# Patient Record
Sex: MALE | Race: BLACK OR AFRICAN AMERICAN | NOT HISPANIC OR LATINO | Employment: OTHER | URBAN - METROPOLITAN AREA
[De-identification: names, ages, dates, MRNs, and addresses within clinical notes are randomized per-mention and may not be internally consistent; named-entity substitution may affect disease eponyms.]

---

## 2020-12-03 ENCOUNTER — HOSPITAL ENCOUNTER (OUTPATIENT)
Dept: CARDIOLOGY | Facility: HOSPITAL | Age: 80
Discharge: HOME | End: 2020-12-03

## 2020-12-23 ENCOUNTER — CONVERSION ENCOUNTER (OUTPATIENT)
Dept: FAMILY MEDICINE CLINIC | Facility: CLINIC | Age: 80
End: 2020-12-23

## 2020-12-23 ENCOUNTER — HOSPITAL ENCOUNTER (OUTPATIENT)
Dept: FAMILY MEDICINE CLINIC | Facility: CLINIC | Age: 80
Discharge: HOME | End: 2020-12-23
Attending: NURSE PRACTITIONER

## 2020-12-23 ENCOUNTER — OFFICE VISIT CONVERTED (OUTPATIENT)
Dept: FAMILY MEDICINE CLINIC | Facility: CLINIC | Age: 80
End: 2020-12-23
Attending: NURSE PRACTITIONER

## 2020-12-23 LAB
ALBUMIN SERPL-MCNC: 4 G/DL (ref 3.5–5)
ALBUMIN/GLOB SERPL: 1.1 {RATIO} (ref 1.4–2.6)
ALP SERPL-CCNC: 87 U/L (ref 56–155)
ALT SERPL-CCNC: 12 U/L (ref 10–40)
ANION GAP SERPL CALC-SCNC: 16 MMOL/L (ref 8–19)
AST SERPL-CCNC: 15 U/L (ref 15–50)
BASOPHILS # BLD AUTO: 0.08 10*3/UL (ref 0–0.2)
BASOPHILS NFR BLD AUTO: 1.1 % (ref 0–3)
BILIRUB SERPL-MCNC: 0.29 MG/DL (ref 0.2–1.3)
BUN SERPL-MCNC: 18 MG/DL (ref 5–25)
BUN/CREAT SERPL: 13 {RATIO} (ref 6–20)
CALCIUM SERPL-MCNC: 9.2 MG/DL (ref 8.7–10.4)
CHLORIDE SERPL-SCNC: 107 MMOL/L (ref 99–111)
CK SERPL-CCNC: 143 U/L (ref 57–374)
CONV ABS IMM GRAN: 0.03 10*3/UL (ref 0–0.2)
CONV CO2: 21 MMOL/L (ref 22–32)
CONV IMMATURE GRAN: 0.4 % (ref 0–1.8)
CONV TOTAL PROTEIN: 7.5 G/DL (ref 6.3–8.2)
CREAT UR-MCNC: 1.36 MG/DL (ref 0.7–1.2)
DEPRECATED RDW RBC AUTO: 46.5 FL (ref 35.1–43.9)
EOSINOPHIL # BLD AUTO: 0.11 10*3/UL (ref 0–0.7)
EOSINOPHIL # BLD AUTO: 1.6 % (ref 0–7)
ERYTHROCYTE [DISTWIDTH] IN BLOOD BY AUTOMATED COUNT: 14.5 % (ref 11.6–14.4)
EST. AVERAGE GLUCOSE BLD GHB EST-MCNC: 143 MG/DL
FOLATE SERPL-MCNC: 4.3 NG/ML (ref 4.8–20)
GFR SERPLBLD BASED ON 1.73 SQ M-ARVRAT: 56 ML/MIN/{1.73_M2}
GLOBULIN UR ELPH-MCNC: 3.5 G/DL (ref 2–3.5)
GLUCOSE SERPL-MCNC: 99 MG/DL (ref 70–99)
HBA1C MFR BLD: 6.6 % (ref 3.5–5.7)
HCT VFR BLD AUTO: 35.6 % (ref 42–52)
HGB BLD-MCNC: 11.1 G/DL (ref 14–18)
LYMPHOCYTES # BLD AUTO: 2.36 10*3/UL (ref 1–5)
LYMPHOCYTES NFR BLD AUTO: 33.7 % (ref 20–45)
MAGNESIUM SERPL-MCNC: 1.67 MG/DL (ref 1.6–2.3)
MCH RBC QN AUTO: 27.1 PG (ref 27–31)
MCHC RBC AUTO-ENTMCNC: 31.2 G/DL (ref 33–37)
MCV RBC AUTO: 87 FL (ref 80–96)
MONOCYTES # BLD AUTO: 0.58 10*3/UL (ref 0.2–1.2)
MONOCYTES NFR BLD AUTO: 8.3 % (ref 3–10)
NEUTROPHILS # BLD AUTO: 3.85 10*3/UL (ref 2–8)
NEUTROPHILS NFR BLD AUTO: 54.9 % (ref 30–85)
NRBC CBCN: 0 % (ref 0–0.7)
OSMOLALITY SERPL CALC.SUM OF ELEC: 294 MOSM/KG (ref 273–304)
PLATELET # BLD AUTO: 239 10*3/UL (ref 130–400)
PMV BLD AUTO: 11.9 FL (ref 9.4–12.4)
POTASSIUM SERPL-SCNC: 3.4 MMOL/L (ref 3.5–5.3)
RBC # BLD AUTO: 4.09 10*6/UL (ref 4.7–6.1)
SODIUM SERPL-SCNC: 141 MMOL/L (ref 135–147)
T4 FREE SERPL-MCNC: 1.4 NG/DL (ref 0.9–1.8)
TSH SERPL-ACNC: 4.88 M[IU]/L (ref 0.27–4.2)
VIT B12 SERPL-MCNC: 411 PG/ML (ref 211–911)
WBC # BLD AUTO: 7.01 10*3/UL (ref 4.8–10.8)

## 2020-12-24 LAB
IRON SATN MFR SERPL: 20 % (ref 20–55)
IRON SERPL-MCNC: 54 UG/DL (ref 70–180)
TIBC SERPL-MCNC: 273 UG/DL (ref 245–450)
TRANSFERRIN SERPL-MCNC: 191 MG/DL (ref 215–365)

## 2020-12-29 ENCOUNTER — HOSPITAL ENCOUNTER (OUTPATIENT)
Dept: CT IMAGING | Facility: HOSPITAL | Age: 80
Discharge: HOME | End: 2020-12-29
Attending: NURSE PRACTITIONER

## 2020-12-30 ENCOUNTER — HOSPITAL ENCOUNTER (OUTPATIENT)
Dept: FAMILY MEDICINE CLINIC | Facility: CLINIC | Age: 80
Discharge: HOME | End: 2020-12-30
Attending: NURSE PRACTITIONER

## 2021-01-01 LAB — SARS-COV-2 RNA SPEC QL NAA+PROBE: NOT DETECTED

## 2021-01-05 ENCOUNTER — HOSPITAL ENCOUNTER (OUTPATIENT)
Dept: CT IMAGING | Facility: HOSPITAL | Age: 81
Discharge: HOME | End: 2021-01-05
Attending: NURSE PRACTITIONER

## 2021-01-13 ENCOUNTER — OFFICE VISIT CONVERTED (OUTPATIENT)
Dept: GASTROENTEROLOGY | Facility: CLINIC | Age: 81
End: 2021-01-13
Attending: NURSE PRACTITIONER

## 2021-01-13 ENCOUNTER — HOSPITAL ENCOUNTER (OUTPATIENT)
Dept: LAB | Facility: HOSPITAL | Age: 81
Discharge: HOME | End: 2021-01-13

## 2021-01-13 LAB
CRP SERPL-MCNC: 25.7 MG/L (ref 0–5)
ERYTHROCYTE [SEDIMENTATION RATE] IN BLOOD: 31 MM/H (ref 0–20)

## 2021-01-18 ENCOUNTER — TELEPHONE CONVERTED (OUTPATIENT)
Dept: FAMILY MEDICINE CLINIC | Facility: CLINIC | Age: 81
End: 2021-01-18
Attending: NURSE PRACTITIONER

## 2021-01-20 ENCOUNTER — HOSPITAL ENCOUNTER (OUTPATIENT)
Dept: LAB | Facility: HOSPITAL | Age: 81
Discharge: HOME | End: 2021-01-20
Attending: NURSE PRACTITIONER

## 2021-01-20 ENCOUNTER — OFFICE VISIT CONVERTED (OUTPATIENT)
Dept: NEUROLOGY | Facility: CLINIC | Age: 81
End: 2021-01-20
Attending: NURSE PRACTITIONER

## 2021-01-20 LAB
ALBUMIN SERPL-MCNC: 4.1 G/DL (ref 3.5–5)
ALBUMIN/GLOB SERPL: 1.2 {RATIO} (ref 1.4–2.6)
ALP SERPL-CCNC: 84 U/L (ref 56–155)
ALT SERPL-CCNC: 12 U/L (ref 10–40)
ANION GAP SERPL CALC-SCNC: 16 MMOL/L (ref 8–19)
AST SERPL-CCNC: 12 U/L (ref 15–50)
BILIRUB SERPL-MCNC: 0.21 MG/DL (ref 0.2–1.3)
BUN SERPL-MCNC: 19 MG/DL (ref 5–25)
BUN/CREAT SERPL: 12 {RATIO} (ref 6–20)
CALCIUM SERPL-MCNC: 9.1 MG/DL (ref 8.7–10.4)
CHLORIDE SERPL-SCNC: 108 MMOL/L (ref 99–111)
CONV CO2: 22 MMOL/L (ref 22–32)
CONV TOTAL PROTEIN: 7.6 G/DL (ref 6.3–8.2)
CREAT UR-MCNC: 1.63 MG/DL (ref 0.7–1.2)
GFR SERPLBLD BASED ON 1.73 SQ M-ARVRAT: 45 ML/MIN/{1.73_M2}
GLOBULIN UR ELPH-MCNC: 3.5 G/DL (ref 2–3.5)
GLUCOSE SERPL-MCNC: 97 MG/DL (ref 70–99)
OSMOLALITY SERPL CALC.SUM OF ELEC: 296 MOSM/KG (ref 273–304)
POTASSIUM SERPL-SCNC: 3.9 MMOL/L (ref 3.5–5.3)
SODIUM SERPL-SCNC: 142 MMOL/L (ref 135–147)
T4 FREE SERPL-MCNC: 1.2 NG/DL (ref 0.9–1.8)
TSH SERPL-ACNC: 5.69 M[IU]/L (ref 0.27–4.2)

## 2021-01-27 ENCOUNTER — OFFICE VISIT CONVERTED (OUTPATIENT)
Dept: NEUROLOGY | Facility: CLINIC | Age: 81
End: 2021-01-27
Attending: NURSE PRACTITIONER

## 2021-01-27 ENCOUNTER — HOSPITAL ENCOUNTER (OUTPATIENT)
Dept: LAB | Facility: HOSPITAL | Age: 81
Discharge: HOME | End: 2021-01-27
Attending: NURSE PRACTITIONER

## 2021-01-27 LAB
CRP SERPL-MCNC: <3 MG/L (ref 0–5)
ERYTHROCYTE [SEDIMENTATION RATE] IN BLOOD: 22 MM/H (ref 0–20)

## 2021-01-28 ENCOUNTER — HOSPITAL ENCOUNTER (OUTPATIENT)
Dept: PREADMISSION TESTING | Facility: HOSPITAL | Age: 81
Discharge: HOME | End: 2021-01-28
Attending: INTERNAL MEDICINE

## 2021-01-29 ENCOUNTER — HOSPITAL ENCOUNTER (OUTPATIENT)
Dept: CT IMAGING | Facility: HOSPITAL | Age: 81
Discharge: HOME | End: 2021-01-29
Attending: NURSE PRACTITIONER

## 2021-01-29 LAB — SARS-COV-2 RNA SPEC QL NAA+PROBE: NOT DETECTED

## 2021-02-02 ENCOUNTER — HOSPITAL ENCOUNTER (OUTPATIENT)
Dept: GASTROENTEROLOGY | Facility: HOSPITAL | Age: 81
Setting detail: HOSPITAL OUTPATIENT SURGERY
Discharge: HOME | End: 2021-02-02
Attending: INTERNAL MEDICINE

## 2021-02-02 LAB — GLUCOSE BLD-MCNC: 176 MG/DL (ref 70–99)

## 2021-02-05 ENCOUNTER — CONVERSION ENCOUNTER (OUTPATIENT)
Dept: OTOLARYNGOLOGY | Facility: CLINIC | Age: 81
End: 2021-02-05

## 2021-02-05 ENCOUNTER — OFFICE VISIT CONVERTED (OUTPATIENT)
Dept: OTOLARYNGOLOGY | Facility: CLINIC | Age: 81
End: 2021-02-05
Attending: OTOLARYNGOLOGY

## 2021-02-10 ENCOUNTER — OFFICE VISIT CONVERTED (OUTPATIENT)
Dept: NEUROLOGY | Facility: CLINIC | Age: 81
End: 2021-02-10
Attending: NURSE PRACTITIONER

## 2021-03-10 ENCOUNTER — HOSPITAL ENCOUNTER (OUTPATIENT)
Dept: LAB | Facility: HOSPITAL | Age: 81
Discharge: HOME | End: 2021-03-10
Attending: NURSE PRACTITIONER

## 2021-03-11 LAB
T4 FREE SERPL-MCNC: 1.3 NG/DL (ref 0.9–1.8)
TSH SERPL-ACNC: 2.09 M[IU]/L (ref 0.27–4.2)

## 2021-03-17 ENCOUNTER — OFFICE VISIT CONVERTED (OUTPATIENT)
Dept: FAMILY MEDICINE CLINIC | Facility: CLINIC | Age: 81
End: 2021-03-17
Attending: NURSE PRACTITIONER

## 2021-03-17 ENCOUNTER — HOSPITAL ENCOUNTER (OUTPATIENT)
Dept: FAMILY MEDICINE CLINIC | Facility: CLINIC | Age: 81
Discharge: HOME | End: 2021-03-17
Attending: NURSE PRACTITIONER

## 2021-03-17 LAB
ALBUMIN SERPL-MCNC: 3.9 G/DL (ref 3.5–5)
ALBUMIN/GLOB SERPL: 1.2 {RATIO} (ref 1.4–2.6)
ALP SERPL-CCNC: 86 U/L (ref 56–155)
ALT SERPL-CCNC: 9 U/L (ref 10–40)
ANION GAP SERPL CALC-SCNC: 18 MMOL/L (ref 8–19)
AST SERPL-CCNC: 11 U/L (ref 15–50)
BASOPHILS # BLD AUTO: 0.04 10*3/UL (ref 0–0.2)
BASOPHILS NFR BLD AUTO: 0.5 % (ref 0–3)
BILIRUB SERPL-MCNC: 0.15 MG/DL (ref 0.2–1.3)
BUN SERPL-MCNC: 17 MG/DL (ref 5–25)
BUN/CREAT SERPL: 11 {RATIO} (ref 6–20)
CALCIUM SERPL-MCNC: 9.1 MG/DL (ref 8.7–10.4)
CHLORIDE SERPL-SCNC: 110 MMOL/L (ref 99–111)
CHOLEST SERPL-MCNC: 134 MG/DL (ref 107–200)
CHOLEST/HDLC SERPL: 2.9 {RATIO} (ref 3–6)
CONV ABS IMM GRAN: 0.03 10*3/UL (ref 0–0.2)
CONV CO2: 22 MMOL/L (ref 22–32)
CONV CREATININE URINE, RANDOM: 204.9 MG/DL (ref 10–300)
CONV IMMATURE GRAN: 0.4 % (ref 0–1.8)
CONV MICROALBUM.,U,RANDOM: 24.2 MG/L (ref 0–20)
CONV TOTAL PROTEIN: 7.2 G/DL (ref 6.3–8.2)
CREAT UR-MCNC: 1.59 MG/DL (ref 0.7–1.2)
DEPRECATED RDW RBC AUTO: 44.1 FL (ref 35.1–43.9)
EOSINOPHIL # BLD AUTO: 0.12 10*3/UL (ref 0–0.7)
EOSINOPHIL # BLD AUTO: 1.5 % (ref 0–7)
ERYTHROCYTE [DISTWIDTH] IN BLOOD BY AUTOMATED COUNT: 14 % (ref 11.6–14.4)
EST. AVERAGE GLUCOSE BLD GHB EST-MCNC: 148 MG/DL
FOLATE SERPL-MCNC: 17.5 NG/ML (ref 4.8–20)
GFR SERPLBLD BASED ON 1.73 SQ M-ARVRAT: 46 ML/MIN/{1.73_M2}
GLOBULIN UR ELPH-MCNC: 3.3 G/DL (ref 2–3.5)
GLUCOSE SERPL-MCNC: 99 MG/DL (ref 70–99)
HBA1C MFR BLD: 6.8 % (ref 3.5–5.7)
HCT VFR BLD AUTO: 36.6 % (ref 42–52)
HDLC SERPL-MCNC: 47 MG/DL (ref 40–60)
HGB BLD-MCNC: 11.5 G/DL (ref 14–18)
LDLC SERPL CALC-MCNC: 64 MG/DL (ref 70–100)
LYMPHOCYTES # BLD AUTO: 2.75 10*3/UL (ref 1–5)
LYMPHOCYTES NFR BLD AUTO: 34.8 % (ref 20–45)
MCH RBC QN AUTO: 27.3 PG (ref 27–31)
MCHC RBC AUTO-ENTMCNC: 31.4 G/DL (ref 33–37)
MCV RBC AUTO: 86.9 FL (ref 80–96)
MICROALBUMIN/CREAT UR: 11.8 MG/G{CRE} (ref 0–25)
MONOCYTES # BLD AUTO: 0.65 10*3/UL (ref 0.2–1.2)
MONOCYTES NFR BLD AUTO: 8.2 % (ref 3–10)
NEUTROPHILS # BLD AUTO: 4.32 10*3/UL (ref 2–8)
NEUTROPHILS NFR BLD AUTO: 54.6 % (ref 30–85)
NRBC CBCN: 0 % (ref 0–0.7)
OSMOLALITY SERPL CALC.SUM OF ELEC: 304 MOSM/KG (ref 273–304)
PLATELET # BLD AUTO: 225 10*3/UL (ref 130–400)
PMV BLD AUTO: 11.7 FL (ref 9.4–12.4)
POTASSIUM SERPL-SCNC: 4.2 MMOL/L (ref 3.5–5.3)
RBC # BLD AUTO: 4.21 10*6/UL (ref 4.7–6.1)
SODIUM SERPL-SCNC: 146 MMOL/L (ref 135–147)
T4 FREE SERPL-MCNC: 1.3 NG/DL (ref 0.9–1.8)
TRIGL SERPL-MCNC: 117 MG/DL (ref 40–150)
TSH SERPL-ACNC: 2.8 M[IU]/L (ref 0.27–4.2)
VIT B12 SERPL-MCNC: 401 PG/ML (ref 211–911)
VLDLC SERPL-MCNC: 23 MG/DL (ref 5–37)
WBC # BLD AUTO: 7.91 10*3/UL (ref 4.8–10.8)

## 2021-03-18 LAB
IRON SATN MFR SERPL: 19 % (ref 20–55)
IRON SERPL-MCNC: 52 UG/DL (ref 70–180)
TIBC SERPL-MCNC: 267 UG/DL (ref 245–450)
TRANSFERRIN SERPL-MCNC: 187 MG/DL (ref 215–365)

## 2021-03-24 ENCOUNTER — HOSPITAL ENCOUNTER (OUTPATIENT)
Dept: LAB | Facility: HOSPITAL | Age: 81
Discharge: HOME | End: 2021-03-24
Attending: INTERNAL MEDICINE

## 2021-03-24 LAB
APPEARANCE UR: CLEAR
BILIRUB UR QL: NEGATIVE
COLOR UR: YELLOW
CONV COLLECTION SOURCE (UA): NORMAL
CONV UROBILINOGEN IN URINE BY AUTOMATED TEST STRIP: 0.2 {EHRLICHU}/DL (ref 0.1–1)
GLUCOSE UR QL: NEGATIVE MG/DL
HGB UR QL STRIP: NEGATIVE
KETONES UR QL STRIP: NEGATIVE MG/DL
LEUKOCYTE ESTERASE UR QL STRIP: NEGATIVE
NITRITE UR QL STRIP: NEGATIVE
PH UR STRIP.AUTO: 5.5 [PH] (ref 5–8)
PROT UR QL: NORMAL MG/DL
SP GR UR: 1.02 (ref 1–1.03)

## 2021-03-25 LAB
CONV CREATININE URINE, RANDOM: 213.8 MG/DL (ref 10–300)
PROT UR-MCNC: 37 MG/DL

## 2021-03-31 ENCOUNTER — HOSPITAL ENCOUNTER (OUTPATIENT)
Dept: ULTRASOUND IMAGING | Facility: HOSPITAL | Age: 81
Discharge: HOME | End: 2021-03-31
Attending: INTERNAL MEDICINE

## 2021-05-10 NOTE — H&P
History and Physical      Patient Name: Too Velasquez   Patient ID: [id number]   Sex: Male   YOB: 1940    Primary Care Provider: Lanie TORRES   Referring Provider: Lanie TORRES    Visit Date: January 13, 2021    Provider: BRIAN Dunn   Location: [facility]   Location Address: [address] [place] [address], [address]  [place], [place]  [id number]   Location Phone: [phone]          Chief Complaint  · Anemia      History Of Present Illness  The patient is a 80 year old male who presents on referral from Lanie TORRES for a gastroenterology evaluation.      He presents for anorexia and anemia.    12/23/2020 CBC: Hemoglobin 11.1, hematocrit 35.6, platelets 239.  CMP: Creatinine 1.36, alk phos 87, ALT 12, AST 15, total bilirubin 0.29.  Iron profile: Iron saturation 20%, total iron 54.  Transferrin 191.  Vitamin B12- 411, folic acid-4.3    12/29/2020 CT abdomen pelvis with contrast-liver and spleen are normal in size and contour.  Cholelithiasis.  No pericholecystic inflammation.  Common bile duct normal.  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  Normal pancreas.  Bilateral renal cysts.  Mild groundglass opacities in the right middle lobe.    He reports that his appetite is good. Denies weight loss.  States that he has not been feeling well since early November.  Tested negative for COVID, but was diagnosed with pneumonia and CVA.      He reports that he continues to have trouble breathing.  Rx'd nebs TID, but not using them TID.   Having some issues with eyes.      Reports a bowel movement daily.  Denies rectal bleeding.  Admits dark stool since beginning oral iron.      Denies heartburn, nausea .  Admits dysphagia with some secretions.      Denies previous colonoscopy.      Weight  stable.    Denies family history of colon cancer.             Past Medical History  Diabetes mellitus; Glaucoma; High cholesterol; Hypertension; Irregular heart rate; Shortness of Breath; Sinusitis         Past Surgical History  Cataract surgery; Tumor removal         Medication List  albuterol sulfate 2.5 mg /3 mL (0.083 %) inhalation solution for nebulization; amlodipine 5 mg oral tablet; diltiazem HCl 240 mg oral capsule,extended release 24 hr; doxycycline hyclate 100 mg oral capsule; Eliquis 2.5 mg oral tablet; finasteride 5 mg oral tablet; folic acid 400 mcg oral tablet; Iron (ferrous sulfate) 325 mg (65 mg iron) oral tablet; Lantus U-100 Insulin 100 unit/mL subcutaneous solution; lisinopril 20 mg oral tablet; metformin 500 mg oral tablet; pravastatin 80 mg oral tablet; prednisone 20 mg oral tablet; tamsulosin 0.4 mg oral capsule         Allergy List  PENICILLINS       Allergies Reconciled  Family Medical History  Heart Disease; Diabetes Mellitus, Type II         Social History  Alcohol (Never); ; Retired [employer]; Tobacco (Never)         Immunizations  Name Date Admin   Influenza 10/01/2020   Prevnar 13 10/20/2018         Review of Systems  · Constitutional  o Denies  o : chills, fever  · Eyes  o Denies  o : blurred vision, changes in vision  · Cardiovascular  o Denies  o : chest pain, irregular heart beats  · Respiratory  o Denies  o : shortness of breath, cough  · Gastrointestinal  o Admits  o : See HPI  · Genitourinary  o Denies  o : dysuria, blood in urine  · Integument  o Denies  o : rash, new skin lesions  · Neurologic  o Denies  o : tingling or numbness, seizures  · Musculoskeletal  o Denies  o : joint pain, joint swelling  · Endocrine  o Denies  o : weight gain, weight loss  · Psychiatric  o Denies  o : anxiety, depression      Vitals  Date Time BP Position Site L\R Cuff Size HR RR TEMP (F) WT  HT  BMI kg/m2 BSA m2 O2 Sat FR L/min FiO2 HC       01/13/2021 10:35 /67 Sitting    82 - R  97.5  "193lbs 6oz 5'  10\" 27.75 2.08             Physical Examination  · Constitutional  o Appearance  o : well developed, well-nourished, in no acute distress  · Eyes  o Vision  o :   § Visual Fields  § : eyes move symmetrical in all directions  o Sclerae  o : anicteric  o Pupils and Irises  o : pupils equal and symmetrical  · Neck  o Inspection/Palpation  o : supple  · Respiratory  o Respiratory Effort  o : breathing unlabored  o Inspection of Chest  o : normal appearance, no retractions  o Auscultation of Lungs  o : clear to auscultation bilaterally  · Cardiovascular  o Heart  o :   § Auscultation of Heart  § : no murmurs, gallops or rubs  · Gastrointestinal  o Abdominal Examination  o : soft, nontender to palpation, with normal active bowel sounds, no appreciable hepatosplenomegaly  o Digital Rectal Exam  o : deferred  · Lymphatic  o Neck  o : no palpable lymphadenopathy  · Skin and Subcutaneous Tissue  o General Inspection  o : without focal lesions; turgor is normal  · Psychiatric  o General  o : Alert and oriented x3  o Mood and Affect  o : Mood and affect are appropriate to circumstances  · Extremities  o Extremities  o : No edema, no cyanosis          Assessment  · Pre-op testing     V72.84/Z01.818  · Other iron deficiency anemia     280.8/D50.8      Plan  · Orders  o [facility] Pre-Op Covid-19 Screening (17157) - - 01/13/2021  o Consent for Colonoscopy with Possible Biopsy - Possible risks/complications, benefits, and alternatives to surgical or invasive procedure have been explained to patient and/or legal guardian. -Patient has been evaluated and can tolerate anesthesia and/or sedation. Risks, benefits, and alternatives to anesthesia and sedation have been explained to patient and/or legal guardian. (25038) - - 01/13/2021  o Consent for Esophagogastroduodenoscopy (EGD) - Possible risks/complications, benefits, and alternatives to surgical or invasive procedure have been explained to patient and/or legal guardian. - " Patient has been evaluated and can tolerate anesthesia and/or sedation. Risks, benefits, and alternatives to anesthesia and sedation have been explained to patient and/or legal guardian. (37720) - - 01/13/2021  · Medications  o Medications have been Reconciled  · Instructions  o Handouts provided: Pre-procedure instructions including date and time and location of procedure.  o PLAN: Proceed with procedure. Patient understands risks and benefits and is willing to proceed. Understands the risks include, but are not limited to, bleeding and/or perforation.  o Information given on current diagnoses.  o Electronically Identified Patient Education Materials Provided Electronically  · Disposition  o Follow Up after procedure            Electronically Signed by: BRIAN Dunn -Author on January 13, 2021 12:22:51 PM

## 2021-05-10 NOTE — H&P
History and Physical      Patient Name: Too Velasquez   Patient ID: [id number]   Sex: Male   YOB: 1940    Primary Care Provider: Lanie TORRES   Referring Provider: Lanie TORRES    Visit Date: January 20, 2021    Provider: BIRAN Jiménez   Location: [facility]   Location Address: [address] [place] [address]  [id number]   Location Phone: [phone]          Chief Complaint     Headache.       History Of Present Illness  Too Velasquez is a 80 year old male who presents today to [facility] today referred from Lanie TORRES. Had eye surgery in November on right eye, and then 6 weeks ago had laser surgery on left eye. Began having decreased vision in left eye. Ophthalmology diagnosed with ocular ischemic syndrome. CRP and Sed rate slightly elevated. Has been having blurred vision in right eye as well. Pressure sensation to frontal head, worse on the right. Has been taking Tylenol for the head pain which helps some. Had MRI brain that was normal. Ophthalmology recommended he see neurology and cardiology. Denies jaw claudication. States blurred vision is intermittent. Denies hip pain. Denies rash. No other new symptoms reported.   Independently Reviewed: MRI brain       Past Medical History  Diabetes mellitus; Glaucoma; Headache; High cholesterol; Hypertension; Insomnia; Irregular heart rate; Shortness of Breath; Sinusitis         Past Surgical History  Cataract surgery; Tumor removal         Medication List  albuterol sulfate 2.5 mg /3 mL (0.083 %) inhalation solution for nebulization; amlodipine 5 mg oral tablet; diltiazem HCl 240 mg oral capsule,extended release 24 hr; Eliquis 2.5 mg oral tablet; finasteride 5 mg oral tablet; folic acid 400 mcg oral tablet; Iron (ferrous sulfate) 325 mg (65 mg iron) oral tablet; Lantus U-100 Insulin 100 unit/mL subcutaneous solution; lisinopril 20 mg oral tablet; metformin 500  "mg oral tablet; Plenvu 140-9-5.2 gram oral powder in packet, sequential; pravastatin 80 mg oral tablet; tamsulosin 0.4 mg oral capsule         Allergy List  PENICILLINS       Allergies Reconciled  Family Medical History  Heart Disease; Diabetes Mellitus, Type II         Social History  Alcohol (Never); ; Retired [employer]; Tobacco (Never)         Immunizations  Name Date Admin   Influenza 10/01/2020   Prevnar 13 10/20/2018         Review of Systems  · Constitutional  o Denies  o : chills, excessive sweating, fatigue, fever, sycope/passing out, weight gain, weight loss  · Eyes  o Denies  o : changes in vision, blurry vision, double vision  · HENT  o Denies  o : loss of hearing, ringing in the ears, ear aches, sore throat, nasal congestion, sinus pain, nose bleeds, seasonal allergies  · Cardiovascular  o Denies  o : blood clots, swollen legs, anemia, easy burising or bleeding, transfusions  · Respiratory  o Denies  o : shortness of breath, dry cough, productive cough, pneumonia, COPD  · Gastrointestinal  o Denies  o : difficulty swallowing, reflux  · Genitourinary  o Denies  o : incontinence  · Neurologic  o Admits  o : headache  o Denies  o : seizure, stroke, tremor, loss of balance, falls, dizziness/vertigo, difficulty with sleep, numbness/tingling/paresthesia , difficulty with coordination, difficulty with dexterity, weakness  · Musculoskeletal  o Denies  o : neck stiffness/pain, swollen lymph nodes, muscle aches, joint pain, weakness, spasms, sciatica, pain radiating in arm, pain radiating in leg, low back pain  · Endocrine  o Denies  o : diabetes, thyroid disorder  · Psychiatric  o Denies  o : anxiety, depression      Vitals  Date Time BP Position Site L\R Cuff Size HR RR TEMP (F) WT  HT  BMI kg/m2 BSA m2 O2 Sat FR L/min FiO2 HC       01/20/2021 07:58 /83 Sitting    78 - R  97.9 191lbs 0oz 5'  10\" 27.41 2.07             Physical Examination  · Constitutional  o Appearance  o : well-nourished, well " groomed, in no apparent distress  · Eyes  o Pupils and Irises  o : Pupils equal, round, and reactive to light and accommodation bilaterally  · Respiratory  o Auscultation of Lungs  o : Lungs were clear to ascultation bilaterally. No wheezes, rhonchi or rales were appreciated.  · Cardiovascular  o Heart  o :   § Auscultation of Heart  § : Regular rate and rhythm, no murmurs, gallops or rubs were appreciated.  o Peripheral Vascular System  o :   § Extremities  § : No peripheral edema was appreciated  · Musculoskeletal  o General  o : Normal bulk and normal tone.  · Neurologic  o Mental Status Examination  o :   § Orientation  § : Alert and oriented to person, place, and time,  § Speech/Language  § : Intact naming, comprehension, and repetition. No dysarthria.  § Memory  § : Immediate recall is 3/3. Recall at 5 minutes is 3/3.   § Attention  § : Attention span is intact to serial 7 examination and finger tapping.   § Fund of Knowledge  § : Adequate fund of knowledge  o Cranial Nerves  o : Pupils are equal, round and reactive to light. Extraocular movements are intact. Visual fields are diminished on the left. Fundoscopic examination reveals sharp disc bilaterally. Sensation in the V1-V3 distribution is intact and symmetric. Muscles of mastication are strong and symmetric. Muscles of facial expression are strong and symmetric. Hearing is intact. Palatal raise is intact and symmetric. Uvula is midline. Shoulder shrug is strong. Tongue protrudes in the midline.  o Motor Examination  o :   § RUE Strength  § : strength normal  § LUE Strength  § : strength normal  § RLE Strength  § : strength normal  § LLE Strength  § : strength normal  o Reflexes  o : 2+ reflexes throughout and symmetric. Negative Saldana. Negative Babinski.   o Sensation  o : Intact sensation to light touch, pinprick, vibration and proprioception throughout.  o Gait and Station  o :   § Gait Screening  § : Mildly antalgic   o Coordination  o : Intact  finger to nose and heel to shin. Rapid alternating movements are intact in the upper and lower extremities.     No pain with palpation or tapping over temporal regions.           Assessment  · New onset headache     784.0/R51.9  Discussed with Dr. Dick. Concern for temporal arteritis low. No clinical correlation. Will give high dose steroids, see back in the clinic in 1 week. Will schedule appt for temporal artery biopsy with Dr. Velasquez for 2 weeks. If improvement with steroid taper, will consider following through with biopsy.   · Blurred vision     368.8/H53.8  · Elevated sed rate     790.1/R70.0  · Elevated C-reactive protein     790.95/R79.82      Plan  · Orders  o ENT CONSULTATION ([facility]) - 784.0/R51.9, 368.8/H53.8, 790.1/R70.0, 790.95/R79.82 - 01/22/2021   Scheduled 2/2/21 at 2:30 with Dr. Davalos  · Medications  o prednisone 20 mg oral tablet   SIG: 3 tablets daily for 5 days, then 2 tablets daily for 4 days, 1 tablet daily for 3 days, 1/2 tablet daily for 2 days   DISP: (26) Tablet with 0 refills  Prescribed on 01/20/2021     o Medications have been Reconciled  o Transition of Care or Provider Policy  · Instructions  o Encouraged to follow-up with Primary Care Provider for preventative care.  o Call or Return if symptoms worsen or persist.   o Total time spent with patient and coordinating patient care was 60 minutes. This includes image review, record review, patient education.            Electronically Signed by: BRIAN Jiménez -Author on January 22, 2021 12:53:02 PM

## 2021-05-10 NOTE — H&P
History and Physical      Patient Name: Too Velasquez   Patient ID: [id number]   Sex: Male   YOB: 1940    Primary Care Provider: Lanie TORRES   Referring Provider: Lanie TORRES    Visit Date: February 5, 2021    Provider: Pete Davalos MD   Location: [facility]   Location Address: [address] [place] [address], [address]  [id number]   Location Phone: [phone]          Chief Complaint     1.  Headache    2.  Foreign body sensation, left eye    3.  Rule out Temporal arteritis       History Of Present Illness  Too Velasquez is a 80 year old male who presents to the office today as a consult from Lanie TORRES.      He presents the clinic today for evaluation of issues with periorbital left-sided pain that causes facial numbness and headache.  He notes that he had no previous issues with this until having a surgical procedure on his left thigh.  He was not sure of the details of what was done, but states that he currently does have foreign body sensation in his eye, and is having frequent issues with discomfort and headaches that he believes are attributed to and start with eye pain.  He was sent to his neurologist for this and based on blood work there was a low suspicion for temporal arteritis, but a steroid course was given on a trial basis.  He informs me that he is currently on the steroids, and has not had any significant relief or improvement.  Based on the neurology notes, the suspicion for temporal arteritis is low but he was sent here for establishing care in case a temporal artery biopsy was needed.  Per the neurologist notes if there is no improvement with the steroids he may not need the biopsy.  He is due to see his ophthalmologist in a month.    He does have several medical comorbidities and is currently on Eliquis.       Past Medical History  Diabetes mellitus; Glaucoma; Headache; High cholesterol; Hypertension; Insomnia;  "Irregular heart rate; Shortness of Breath; Sinusitis         Past Surgical History  Cataract surgery; Tumor removal         Medication List  albuterol sulfate 2.5 mg /3 mL (0.083 %) inhalation solution for nebulization; amlodipine 5 mg oral tablet; diltiazem HCl 240 mg oral capsule,extended release 24 hr; Eliquis 2.5 mg oral tablet; finasteride 5 mg oral tablet; folic acid 400 mcg oral tablet; Iron (ferrous sulfate) 325 mg (65 mg iron) oral tablet; Lantus U-100 Insulin 100 unit/mL subcutaneous solution; lisinopril 20 mg oral tablet; metformin 500 mg oral tablet; pravastatin 80 mg oral tablet; Synthroid 25 mcg oral tablet; tamsulosin 0.4 mg oral capsule         Allergy List  PENICILLINS         Family Medical History  Family history of heart disease; Family history of diabetes mellitus         Social History  Alcohol (Never); ; Retired [employer]; Tobacco (Never)         Immunizations  Name Date Admin   Influenza 10/01/2020   Prevnar 13 10/20/2018         Review of Systems  · Constitutional  o Denies  o : fever, night sweats, weight loss  · Eyes  o Admits  o : impaired vision  o Denies  o : discharge from eye  · HENT  o Admits  o : *See HPI  · Cardiovascular  o Denies  o : chest pain, irregular heart beats  · Respiratory  o Denies  o : shortness of breath, wheezing, coughing up blood  · Gastrointestinal  o Denies  o : heartburn, reflux, vomiting blood  · Genitourinary  o Admits  o : frequency  · Integument  o Admits  o : skin dryness  o Denies  o : rash  · Neurologic  o Admits  o : loss of balance  o Denies  o : seizures, loss of consciousness, dizziness  · Endocrine  o Denies  o : cold intolerance, heat intolerance  · Heme-Lymph  o Admits  o : anemia  o Denies  o : easy bleeding      Vitals  Date Time BP Position Site L\R Cuff Size HR RR TEMP (F) WT  HT  BMI kg/m2 BSA m2 O2 Sat FR L/min FiO2 HC       02/05/2021 10:27 AM        97.5 193lbs 7oz 5'  10\" 27.76 2.08             Physical " Examination  · Constitutional  o Appearance  o : well developed, well-nourished, alert and in no acute distress, voice clear and strong  · Head and Face  o Head  o :   § Inspection  § : no deformities or lesions  o Face  o :   § Inspection  § : No facial lesions; House-Brackmann I/VI bilaterally  § Palpation  § : No TMJ crepitus nor  muscle tenderness bilaterally  · Eyes  o Vision  o :   § Visual Fields  § : Extraocular movements are intact. No spontaneous or gaze-induced nystagmus.  o Conjunctivae  o : clear  o Sclerae  o : clear  o Pupils and Irises  o : pupils equal, round, and reactive to light.   · Ears, Nose, Mouth and Throat  o Ears  o :   § External Ears  § : appearance within normal limits, no lesions present  § Otoscopic Examination  § : tympanic membrane appearance within normal limits bilaterally without perforations, well-aerated middle ears  § Hearing  § : intact to conversational voice both ears  o Nose  o :   § External Nose  § : appearance normal  § Intranasal Exam  § : mucosa within normal limits, vestibules normal, no intranasal lesions present, septum midline, sinuses non tender to percussion  o Oral Cavity  o :   § Oral Mucosa  § : oral mucosa normal without pallor or cyanosis  § Lips  § : lip appearance normal  § Teeth  § : normal dentition for age  § Gums  § : gums pink, non-swollen, no bleeding present  § Tongue  § : tongue appearance normal; normal mobility  § Palate  § : hard palate normal, soft palate appearance normal with symmetric mobility  o Throat  o :   § Oropharynx  § : no inflammation or lesions present, tonsils within normal limits  § Hypopharynx  § : appearance within normal limits, superior epiglottis within normal limits  § Larynx  § : appearance within normal limits, vocal cords within normal limits, no lesions present  · Neck  o Inspection/Palpation  o : normal appearance, no masses or tenderness, trachea midline; thyroid size normal, nontender, no nodules or masses  present on palpation  · Respiratory  o Respiratory Effort  o : breathing unlabored  o Inspection of Chest  o : normal appearance, no retractions  · Cardiovascular  o Heart  o : regular rate and rhythm  · Lymphatic  o Neck  o : no lymphadenopathy present  o Supraclavicular Nodes  o : no lymphadenopathy present  o Preauricular Nodes  o : no lymphadenopathy present  · Skin and Subcutaneous Tissue  o General Inspection  o : Regarding face and neck - there are no rashes present, no lesions present, and no areas of discoloration  · Neurologic  o Cranial Nerves  o : cranial nerves II-XII are grossly intact bilaterally  o Gait and Station  o : normal gait, able to stand without diffculty  · Psychiatric  o Judgement and Insight  o : judgment and insight intact  o Mood and Affect  o : mood normal, affect appropriate          Assessment  · Headache     784.0/R51.9  · Eye pain     379.91/H57.10  · Temporal arteritis syndrome     446.5/M31.6      Plan  · Medications  o Medications have been Reconciled  o Transition of Care or Provider Policy  · Instructions  o He presents the clinic today for evaluation of issues with periorbital left-sided pain that causes facial numbness and headache. He notes that he had no previous issues with this until having a surgical procedure on his left thigh. He was not sure of the details of what was done, but states that he currently does have foreign body sensation in his eye, and is having frequent issues with discomfort and headaches that he believes are attributed to and start with eye pain. He was sent to his neurologist for this and based on blood work there was a low suspicion for temporal arteritis, but a steroid course was given on a trial basis. He informs me that he is currently on the steroids, and has not had any significant relief or improvement. Based on the neurology notes, the suspicion for temporal arteritis is low but he was sent here for establishing care in case a temporal  artery biopsy was needed. Per the neurologist notes if there is no improvement with the steroids he may not need the biopsy. He is due to see his ophthalmologist in a month.He does have several medical comorbidities and is currently on Eliquis. I had a lengthy discussion with him regarding the procedure, and given the history of how his symptoms started, I think there is a low likelihood that this is due to temporal arteritis. I recommended that he see his ophthalmologist sooner due to continued issues with foreign body sensation in the eye. Given his medical history and issues unless there is a strong case of temporal arteritis, I will plan on holding off for now, and will await any recommendations from the ophthalmologist as well as his neurologist.  o Electronically Identified Patient Education Materials Provided Electronically  · Correspondence  o ENT Letter to Referring MD (Lanie TORRES) - 02/05/2021            Electronically Signed by: Pete Davalos MD -Author on February 5, 2021 03:41:29 PM

## 2021-05-10 NOTE — H&P
"   History and Physical      Patient Name: Too Velasquez   Patient ID: [id number]   Sex: Male   YOB: 1940    Primary Care Provider: Lanie TORRES    Visit Date: December 23, 2020    Provider: BRIAN Arizmendi   Location: [facility]   Location Address: [address] [facility] [place]  [id number]   Location Phone: [phone]          Chief Complaint     Establish care today  peeing a lot q 2 hours  Pain left side after eating  states sometimes constipation and sometime diarrhea       History Of Present Illness  Too Velasquez is a 80 year old male who presents for evaluation and treatment of:      He is here wit his wife to [facility].  Last PCP was J Carlos PICKARD:  He said that his sugars are \"running 7090's in the am and she cuts my insulin down at times\".  He is concerned with the belly pains come and go, thin little poop will be constipated and then diarrhea.  He doesn't see blood but he doesn't know \"what I am supposed to be looking at\"  He did take some his wife's miralax and it helped but then he went to diarrhea.  He has not had a scope.  He doesn't want a colonoscopy right now.  He is not depressed per pt just \"I don't feel good\".  diarrhea  he took some of his wife's miralax.  Doesn't want to eat much.  He is eating and drinking just not a lot.       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         Medication List  Name Date Started Instructions   amlodipine 5 mg oral tablet  take 1 tablet (5 mg) by oral route once daily   diltiazem HCl 240 mg oral capsule,extended release 24 hr  take 1 capsule (240 mg) by oral route once daily   Eliquis 2.5 mg oral tablet  " take 1 tablet (2.5 mg) by oral route 2 times per day   finasteride 5 mg oral tablet  take 1 tablet (5 mg) by oral route once daily   Lantus U-100 Insulin 100 unit/mL subcutaneous solution  inject 30 units by subcutaneous route daily   lisinopril 20 mg oral tablet  take 1 tablet (20 mg) by oral route once daily   metformin 500 mg oral tablet  take 1 tablet (500 mg) by oral route 2 times per day with morning and evening meals   pravastatin 80 mg oral tablet  take 1 tablet (80 mg) by oral route once daily at bedtime   tamsulosin 0.4 mg oral capsule  take 1 capsule (0.4 mg) by oral route once daily 1/2 hour following the same meal each day         Allergy List  Allergen Name Date Reaction Notes   PENICILLINS --  --  --        Allergies Reconciled  Review of Systems  · Constitutional  o Admits  o : fatigue  o Denies  o : fever, weight loss, weight gain  · HENT  o Denies  o : vertigo  · Cardiovascular  o Denies  o : lower extremity edema, claudication, chest pressure, palpitations  · Respiratory  o Denies  o : shortness of breath, wheezing, cough, hemoptysis, dyspnea on exertion  · Gastrointestinal  o Admits  o : nausea, diarrhea, constipation  o Denies  o : vomiting, abdominal pain      Vitals  Date Time BP Position Site L\R Cuff Size HR RR TEMP (F) WT  HT  BMI kg/m2 BSA m2 O2 Sat FR L/min FiO2 HC       12/23/2020 10:40 /90 Sitting    90 - R 18 98.2 197lbs 5oz    97 %      12/23/2020 11:09 /78 Sitting                       Physical Examination  · Constitutional  o Appearance  o : well-nourished, well developed, alert, in no acute distress  · Respiratory  o Respiratory Effort  o : breathing unlabored  o Auscultation of Lungs  o : normal breath sounds throughout  · Cardiovascular  o Heart  o :   § Auscultation of Heart  § : regular rate and rhythm, no murmurs, gallops or rubs  § Palpation of Heart  § : normal apical impulse, no cardiac thrill present  · Neurologic  o Mental Status Examination  o :    § Orientation  § : grossly oriented to person, place and time  o Cranial Nerves  o : cranial nerves intact and symmetric throughout  · Psychiatric  o Mood and Affect  o : mood normal, affect appropriate, denies any SI/HI          Assessment  · Anemia     285.9/D64.9  · Diabetes mellitus, type 2     250.00/E11.9  · Diarrhea     787.91/R19.7  · Screening for depression     V79.0/Z13.89  · Weakness     780.79/R53.1  · Atrial fibrillation     427.31/I48.91  · Constipation     564.00/K59.00  · Low magnesium level     790.6/R79.0      Plan  · Orders  o ACO-18: Negative screen for clinical depression using a standardized tool () - V79.0/Z13.89 - 2020  o CBC with Auto Diff [facility] (25556) - - 2020  o CMP [facility] (51904) - - 2020  o Hgb A1c [facility] (06274) - - 2020  o Thyroid Profile (77121, 90287, THYII) - - 2020  o ACO-39: Current medications updated and reviewed (1159F, ) - - 2020  o ACO-14: Influenza immunization administered or previously received [facility] () - - 2020  o Iron Profile (Iron 65326 TIBC 43635 and Transferrin 70984) (IRONP) - - 2020  o Vitamin B-12 (77007) - - 2020  o Folate (Folic Acid) (55440) - - 2020  o CT Abdomen and Pelvis with IV Contrast [facility]; suggest Oral Prep (90011) - - 2020  o Magnesium, serum (32907) - - 2020  o CPK Total [facility] (10848) - - 2020  · Medications  o Lantus U-100 Insulin 100 unit/mL subcutaneous solution   SI units daily SQ   DISP: (1) Vial with 0 refills  Prescribed on 2020     o Medications have been Reconciled  o Transition of Care or Provider Policy  · Instructions  o Depression Screen completed and scanned into the EMR under the designated folder within the patient's documents.  o Today's PHQ-9 result is __13_  o Patient was educated/instructed on their diagnosis, treatment and medications prior to discharge from the clinic today.  o Patient instructed to seek medical attention urgently for  new or worsening symptoms.  o Call the office with any concerns or questions.  o Time spent with the patient was 30minutes, more than 50% face to face.  o We will do labs today, get U/s of the neck(on hold) scheduled and CT of the abd/pelvis to check for any mass/tumor/blockage. We may set up colonoscopy in the near future.  · Disposition  o Call or Return if symptoms worsen or persist.            Electronically Signed by: BRIAN Arizmendi -Author on December 23, 2020 01:48:06 PM

## 2021-05-14 VITALS
RESPIRATION RATE: 14 BRPM | TEMPERATURE: 97.8 F | HEIGHT: 70 IN | WEIGHT: 193.56 LBS | DIASTOLIC BLOOD PRESSURE: 66 MMHG | BODY MASS INDEX: 27.71 KG/M2 | SYSTOLIC BLOOD PRESSURE: 145 MMHG | HEART RATE: 70 BPM | OXYGEN SATURATION: 97 %

## 2021-05-14 VITALS
HEART RATE: 78 BPM | WEIGHT: 191 LBS | SYSTOLIC BLOOD PRESSURE: 179 MMHG | BODY MASS INDEX: 27.35 KG/M2 | DIASTOLIC BLOOD PRESSURE: 83 MMHG | HEIGHT: 70 IN | TEMPERATURE: 97.9 F

## 2021-05-14 VITALS
DIASTOLIC BLOOD PRESSURE: 67 MMHG | SYSTOLIC BLOOD PRESSURE: 131 MMHG | TEMPERATURE: 97.5 F | HEART RATE: 82 BPM | WEIGHT: 193.37 LBS | BODY MASS INDEX: 27.68 KG/M2 | HEIGHT: 70 IN

## 2021-05-14 VITALS — SYSTOLIC BLOOD PRESSURE: 152 MMHG | DIASTOLIC BLOOD PRESSURE: 78 MMHG

## 2021-05-14 VITALS
RESPIRATION RATE: 18 BRPM | TEMPERATURE: 98.2 F | SYSTOLIC BLOOD PRESSURE: 148 MMHG | HEART RATE: 90 BPM | DIASTOLIC BLOOD PRESSURE: 90 MMHG | WEIGHT: 197.31 LBS | OXYGEN SATURATION: 97 %

## 2021-05-14 VITALS
TEMPERATURE: 97.3 F | WEIGHT: 190 LBS | SYSTOLIC BLOOD PRESSURE: 149 MMHG | HEART RATE: 74 BPM | BODY MASS INDEX: 27.2 KG/M2 | DIASTOLIC BLOOD PRESSURE: 80 MMHG | HEIGHT: 70 IN

## 2021-05-14 VITALS — HEIGHT: 70 IN | TEMPERATURE: 97.5 F | WEIGHT: 193.44 LBS | BODY MASS INDEX: 27.69 KG/M2

## 2021-05-14 NOTE — PROGRESS NOTES
Progress Note      Patient Name: Too Velasquez   Patient ID: [id number]   Sex: Male   YOB: 1940    Primary Care Provider: Lanie TORRES   Referring Provider: Lanie TORRES    Visit Date: February 10, 2021    Provider: BRIAN Jiménez   Location: [facility]   Location Address: [address] [place] [address]  [id number]   Location Phone: [phone]          Chief Complaint     f/u to discuss CTA       History Of Present Illness  Too Velasquez is a 80 year old male who presents today to [facility] today referred from Lanie TORRES. Had eye surgery in November on right eye, and then 6 weeks ago had laser surgery on left eye. Began having decreased vision in left eye. Ophthalmology diagnosed with ocular ischemic syndrome. CRP and Sed rate slightly elevated. Has been having blurred vision in right eye as well. Pressure sensation to frontal head, worse on the right. Has been taking Tylenol for the head pain which helps some. Had MRI brain that was normal. Ophthalmology recommended he see neurology and cardiology. Denies jaw claudication. States blurred vision is intermittent. Denies hip pain. Denies rash. No other new symptoms reported.   Independently Reviewed: MRI brain   Too Velasquez is a 80 year old male who presents today to [facility] today for a follow up exam. Discussed CTA results. Was normal. No occlusive disease or hemodynamically significant stenosis. States he's seeing Shruthi and they gave him eyedrops a couple days ago that have significantly decreased headache. They have told him that he has some bleeding behind his left eye and have referred him to a specialist he sees next week for that.       Past Medical History  Diabetes mellitus; Glaucoma; Headache; High cholesterol; Hypertension; Insomnia; Irregular heart rate; Shortness of Breath; Sinusitis         Past Surgical  History  Cataract surgery; Tumor removal         Medication List  albuterol sulfate 2.5 mg /3 mL (0.083 %) inhalation solution for nebulization; amlodipine 5 mg oral tablet; diltiazem HCl 240 mg oral capsule,extended release 24 hr; Eliquis 2.5 mg oral tablet; finasteride 5 mg oral tablet; folic acid 400 mcg oral tablet; Iron (ferrous sulfate) 325 mg (65 mg iron) oral tablet; Lantus U-100 Insulin 100 unit/mL subcutaneous solution; lisinopril 20 mg oral tablet; metformin 500 mg oral tablet; pravastatin 80 mg oral tablet; Synthroid 25 mcg oral tablet; tamsulosin 0.4 mg oral capsule         Allergy List  PENICILLINS       Allergies Reconciled  Family Medical History  Family history of heart disease; Family history of diabetes mellitus         Social History  Alcohol (Never); ; Retired [employer]; Tobacco (Never)         Immunizations  Name Date Admin   Influenza 10/01/2020   Prevnar 13 10/20/2018         Review of Systems  · Constitutional  o Denies  o : chills, excessive sweating, fatigue, fever, sycope/passing out, weight gain, weight loss  · Eyes  o Denies  o : changes in vision, blurry vision, double vision  · HENT  o Denies  o : loss of hearing, ringing in the ears, ear aches, sore throat, nasal congestion, sinus pain, nose bleeds, seasonal allergies  · Cardiovascular  o Denies  o : blood clots, swollen legs, anemia, easy burising or bleeding, transfusions  · Respiratory  o Denies  o : shortness of breath, dry cough, productive cough, pneumonia, COPD  · Gastrointestinal  o Denies  o : difficulty swallowing, reflux  · Genitourinary  o Denies  o : incontinence  · Neurologic  o Admits  o : headache  o Denies  o : seizure, stroke, tremor, loss of balance, falls, dizziness/vertigo, difficulty with sleep, numbness/tingling/paresthesia , difficulty with coordination, difficulty with dexterity, weakness  · Musculoskeletal  o Denies  o : neck stiffness/pain, swollen lymph nodes, muscle aches, joint pain, weakness, spasms,  sciatica, pain radiating in arm, pain radiating in leg, low back pain  · Endocrine  o Denies  o : diabetes, thyroid disorder  · Psychiatric  o Denies  o : anxiety, depression      Physical Examination  · Constitutional  o Appearance  o : well-nourished, well groomed, in no apparent distress  · Eyes  o Pupils and Irises  o : Pupils equal, round, and reactive to light and accommodation bilaterally  · Respiratory  o Auscultation of Lungs  o : Lungs were clear to ascultation bilaterally. No wheezes, rhonchi or rales were appreciated.  · Cardiovascular  o Heart  o :   § Auscultation of Heart  § : Regular rate and rhythm, no murmurs, gallops or rubs were appreciated.  o Peripheral Vascular System  o :   § Extremities  § : No peripheral edema was appreciated  · Musculoskeletal  o General  o : Normal bulk and normal tone.  · Neurologic  o Mental Status Examination  o :   § Orientation  § : Alert and oriented to person, place, and time,  § Speech/Language  § : Intact naming, comprehension, and repetition. No dysarthria.  § Memory  § : Immediate recall is 3/3. Recall at 5 minutes is 3/3.   § Attention  § : Attention span is intact to serial 7 examination and finger tapping.   § Fund of Knowledge  § : Adequate fund of knowledge  o Cranial Nerves  o : Pupils are equal, round and reactive to light. Extraocular movements are intact. Visual fields are diminished on the left. Fundoscopic examination reveals sharp disc bilaterally. Sensation in the V1-V3 distribution is intact and symmetric. Muscles of mastication are strong and symmetric. Muscles of facial expression are strong and symmetric. Hearing is intact. Palatal raise is intact and symmetric. Uvula is midline. Shoulder shrug is strong. Tongue protrudes in the midline.  o Motor Examination  o :   § RUE Strength  § : strength normal  § LUE Strength  § : strength normal  § RLE Strength  § : strength normal  § LLE Strength  § : strength normal  o Reflexes  o : 2+ reflexes  throughout and symmetric. Negative Saldana. Negative Babinski.   o Sensation  o : Intact sensation to light touch, pinprick, vibration and proprioception throughout.  o Gait and Station  o :   § Gait Screening  § : Mildly antalgic   o Cerebellar Function  o : intact finger to nose and heel to shin. Rapid alternating movements are intact in the upper and lower extremities.   o Coordination  o : Intact finger to nose and heel to shin. Rapid alternating movements are intact in the upper and lower extremities.     No pain with palpation or tapping over temporal regions.           Assessment  · New onset headache     784.0/R51.9  CRP/Sed rate have declined. CTA head and neck are normal. MRI brain was normal. Thus far, no cause of symptoms identified from neurologic perspective. He had no symptom improvement with steroids. He has had symptom improvement with eyedrops. Frontal headache likely secondary to eye pain/hemorrhage. Will defer to ophthalmology for treatment. He will f/u here on PRN basis.   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  · Disposition  o Call or Return if symptoms worsen or persist.            Electronically Signed by: BRIAN Jiménez -Author on February 10, 2021 09:39:42 AM

## 2021-05-14 NOTE — PROGRESS NOTES
Progress Note      Patient Name: Too Velasquez   Patient ID: [id number]   Sex: Male   YOB: 1940    Primary Care Provider: Lanie TORRES   Referring Provider: Lanie TORRES    Visit Date: January 27, 2021    Provider: BRIAN Jiménez   Location: [facility]   Location Address: [address] [place] [address]  [id number]   Location Phone: [phone]          Chief Complaint  · Follow Up Exam      History Of Present Illness  Too Velasquez is a 80 year old male who presents today to [email or web address] today referred from Lanie TORRES. Had eye surgery in November on right eye, and then 6 weeks ago had laser surgery on left eye. Began having decreased vision in left eye. Ophthalmology diagnosed with ocular ischemic syndrome. CRP and Sed rate slightly elevated. Has been having blurred vision in right eye as well. Pressure sensation to frontal head, worse on the right. Has been taking Tylenol for the head pain which helps some. Had MRI brain that was normal. Ophthalmology recommended he see neurology and cardiology. Denies jaw claudication. States blurred vision is intermittent. Denies hip pain. Denies rash. No other new symptoms reported.   Independently Reviewed: MRI brain   Too Velasquez is a 80 year old male who presents today to [email or web address] today for a follow up exam. Mild improvement in headache symptoms with prednisone. Continues to experience headache to left frontal head that radiates to right frontal head. Worse with lying down.       Past Medical History  Diabetes mellitus; Glaucoma; Headache; High cholesterol; Hypertension; Insomnia; Irregular heart rate; Shortness of Breath; Sinusitis         Past Surgical History  Cataract surgery; Tumor removal         Medication List  albuterol sulfate 2.5 mg /3 mL (0.083 %) inhalation solution for nebulization; amlodipine 5 mg oral tablet; diltiazem HCl 240 mg oral  capsule,extended release 24 hr; Eliquis 2.5 mg oral tablet; finasteride 5 mg oral tablet; folic acid 400 mcg oral tablet; Iron (ferrous sulfate) 325 mg (65 mg iron) oral tablet; Lantus U-100 Insulin 100 unit/mL subcutaneous solution; lisinopril 20 mg oral tablet; metformin 500 mg oral tablet; Plenvu 140-9-5.2 gram oral powder in packet, sequential; pravastatin 80 mg oral tablet; prednisone 20 mg oral tablet; Synthroid 25 mcg oral tablet; tamsulosin 0.4 mg oral capsule         Allergy List  PENICILLINS       Allergies Reconciled  Family Medical History  Heart Disease; Diabetes Mellitus, Type II         Social History  Alcohol (Never); ; Retired [employer]; Tobacco (Never)         Immunizations  Name Date Admin   Influenza 10/01/2020   Prevnar 13 10/20/2018         Review of Systems  · Constitutional  o Denies  o : chills, excessive sweating, fatigue, fever, sycope/passing out, weight gain, weight loss  · Eyes  o Denies  o : changes in vision, blurry vision, double vision  · HENT  o Denies  o : loss of hearing, ringing in the ears, ear aches, sore throat, nasal congestion, sinus pain, nose bleeds, seasonal allergies  · Cardiovascular  o Denies  o : blood clots, swollen legs, anemia, easy burising or bleeding, transfusions  · Respiratory  o Denies  o : shortness of breath, dry cough, productive cough, pneumonia, COPD  · Gastrointestinal  o Denies  o : difficulty swallowing, reflux  · Genitourinary  o Denies  o : incontinence  · Neurologic  o Admits  o : headache  o Denies  o : seizure, stroke, tremor, loss of balance, falls, dizziness/vertigo, difficulty with sleep, numbness/tingling/paresthesia , difficulty with coordination, difficulty with dexterity, weakness  · Musculoskeletal  o Denies  o : neck stiffness/pain, swollen lymph nodes, muscle aches, joint pain, weakness, spasms, sciatica, pain radiating in arm, pain radiating in leg, low back pain  · Endocrine  o Denies  o : diabetes, thyroid  "disorder  · Psychiatric  o Denies  o : anxiety, depression      Vitals  Date Time BP Position Site L\R Cuff Size HR RR TEMP (F) WT  HT  BMI kg/m2 BSA m2 O2 Sat FR L/min FiO2 HC       01/27/2021 07:50 /80 Sitting    74 - R  97.3 190lbs 0oz 5'  10\" 27.26 2.06             Physical Examination  · Constitutional  o Appearance  o : well-nourished, well groomed, in no apparent distress  · Eyes  o Pupils and Irises  o : Pupils equal, round, and reactive to light and accommodation bilaterally  · Respiratory  o Auscultation of Lungs  o : Lungs were clear to ascultation bilaterally. No wheezes, rhonchi or rales were appreciated.  · Cardiovascular  o Heart  o :   § Auscultation of Heart  § : Regular rate and rhythm, no murmurs, gallops or rubs were appreciated.  o Peripheral Vascular System  o :   § Extremities  § : No peripheral edema was appreciated  · Musculoskeletal  o General  o : Normal bulk and normal tone.  · Neurologic  o Mental Status Examination  o :   § Orientation  § : Alert and oriented to person, place, and time,  § Speech/Language  § : Intact naming, comprehension, and repetition. No dysarthria.  § Memory  § : Immediate recall is 3/3. Recall at 5 minutes is 3/3.   § Attention  § : Attention span is intact to serial 7 examination and finger tapping.   § Fund of Knowledge  § : Adequate fund of knowledge  o Cranial Nerves  o : Pupils are equal, round and reactive to light. Extraocular movements are intact. Visual fields are diminished on the left. Fundoscopic examination reveals sharp disc bilaterally. Sensation in the V1-V3 distribution is intact and symmetric. Muscles of mastication are strong and symmetric. Muscles of facial expression are strong and symmetric. Hearing is intact. Palatal raise is intact and symmetric. Uvula is midline. Shoulder shrug is strong. Tongue protrudes in the midline.  o Motor Examination  o :   § RUE Strength  § : strength normal  § LUE Strength  § : strength normal  § RLE " Strength  § : strength normal  § LLE Strength  § : strength normal  o Reflexes  o : 2+ reflexes throughout and symmetric. Negative Saldana. Negative Babinski.   o Sensation  o : Intact sensation to light touch, pinprick, vibration and proprioception throughout.  o Gait and Station  o :   § Gait Screening  § : Mildly antalgic   o Cerebellar Function  o : intact finger to nose and heel to shin. Rapid alternating movements are intact in the upper and lower extremities.   o Coordination  o : Intact finger to nose and heel to shin. Rapid alternating movements are intact in the upper and lower extremities.     No pain with palpation or tapping over temporal regions.           Assessment  · New onset headache     784.0/R51.9  Will order CTA for further evaluation of vasculature to ensure no cause of headache in the presence of ocular ischemic syndrome. Will recheck CRP/Sed rate to look for improvement.   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   o Follow up in 2 weeks.   · Disposition  o Call or Return if symptoms worsen or persist.            Electronically Signed by: BRIAN Jiménez -Author on January 28, 2021 11:35:19 AM

## 2021-05-14 NOTE — PROGRESS NOTES
Progress Note      Patient Name: Too Velasquez   Patient ID: [id number]   Sex: Male   YOB: 1940    Primary Care Provider: Lanie TORRES   Referring Provider: Lanie TORRES    Visit Date: March 17, 2021    Provider: BRIAN Arizmendi   Location: [facility]   Location Address: [address] [place], [place] [address]  [place], [place]  [id number]   Location Phone: [phone]          Chief Complaint     3 month follow up and med refills       History Of Present Illness  Too Velasquez is a 80 year old male who presents for evaluation and treatment of:      He is here for check up.   DM:  He is checking sugar and 70-80's in AM.  He will adjust the insulin from 18-30 units daily.  HTN:  he is doing good with his meds.  He goes to the heart dr and hem/onc and he goes there in 1 month.  He is taking his reg med.  He is going to the eye dr ([facility] next month).  He just saw him last week.    He is still SOA at times but doing good overall.  THryoid doing well with current dose  BPH still getting up 2-3 ties a night.  No blood when you pee.       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         Medication List  Name Date Started Instructions   albuterol sulfate 2.5 mg /3 mL (0.083 %) inhalation solution for nebulization 01/07/2021 inhale 3 milliliters (2.5 mg) by nebulization route 3 times per day dx pneumonia   amlodipine 5 mg oral tablet  take 1 tablet (5 mg) by oral route once daily   diltiazem HCl 240 mg oral capsule,extended release 24 hr  take 1 capsule (240 mg) by oral route once daily   Eliquis 2.5 mg oral tablet   take 1 tablet (2.5 mg) by oral route 2 times per day   finasteride 5 mg oral tablet 02/18/2021 take 1 tablet (5 mg) by oral route once daily   folic acid 400 mcg oral tablet 12/28/2020 take 1 tablet (0.4 mg) by oral route once daily for 90 days   Iron (ferrous sulfate) 325 mg (65 mg iron) oral tablet 12/28/2020 take 1 tablet (325 mg) by oral route 2 times per day for 30 days   Lantus U-100 Insulin 100 unit/mL subcutaneous solution 02/09/2021 30 units daily SQ   lisinopril 20 mg oral tablet 01/25/2021 take 1 tablet (20 mg) by oral route once daily   metformin 500 mg oral tablet 03/09/2021 take 1 tablet (500 mg) by oral route 2 times per day with morning and evening meals   pravastatin 80 mg oral tablet  take 1 tablet (80 mg) by oral route once daily at bedtime   Synthroid 25 mcg oral tablet 01/27/2021 take 1 tablet (25 mcg) by oral route once daily on an empty stomach 30 minutes before breakfast for 30 days   tamsulosin 0.4 mg oral capsule  take 1 capsule (0.4 mg) by oral route once daily 1/2 hour following the same meal each day         Allergy List  Allergen Name Date Reaction Notes   PENICILLINS --  --  --        Allergies Reconciled  Family Medical History  Disease Name Relative/Age Notes   Family history of heart disease Father/  Mother/   --    Family history of diabetes mellitus Mother/  Sister/   --          Social History  Finding Status Start/Stop Quantity Notes   Alcohol Never --/-- --  --     --  --/-- --  --    Retired [employer] --  --/-- --  --    Tobacco Never --/-- --  --          Immunizations  NameDate Admin Mfg Trade Name Lot Number Route Inj VIS Given VIS Publication   COVID Ulanhcj7002/26/2021 MOD Moderna COVID-19 Vaccine  NE NE 03/17/2021    Comments:    COVID Scvueqc9801/29/2021 MOD Moderna COVID-19 Vaccine  NE NE 03/17/2021    Comments:    Vvuzadxjg27/01/2020 SKB Fluarix, quadrivalent, preservative free  NE NE 12/23/2020    Comments:    Prevnar 1310/20/2018 NE Not Entered  NE NE 12/23/2020   "  Comments:          Review of Systems  · Constitutional  o Admits  o : fatigue  o Denies  o : fever, weight loss, weight gain  · Cardiovascular  o Denies  o : lower extremity edema, claudication, chest pressure, palpitations  · Respiratory  o Admits  o : shortness of breath  o Denies  o : wheezing, cough, hemoptysis, dyspnea on exertion  · Gastrointestinal  o Denies  o : nausea, vomiting, diarrhea, constipation, abdominal pain  · Integument  o Denies  o : rash, itching      Vitals  Date Time BP Position Site L\R Cuff Size HR RR TEMP (F) WT  HT  BMI kg/m2 BSA m2 O2 Sat FR L/min FiO2 HC       03/17/2021 01:09 /66 Sitting    70 - R 14 97.8 193lbs 9oz 5'  10\" 27.77 2.08 97 %      03/17/2021 01:16 /70 Sitting                       Physical Examination  · Constitutional  o Appearance  o : well-nourished, well developed, alert, in no acute distress  · Neck  o Inspection/Palpation  o : normal appearance, no masses or tenderness, trachea midline  · Respiratory  o Respiratory Effort  o : breathing unlabored  o Auscultation of Lungs  o : normal breath sounds throughout  · Cardiovascular  o Heart  o :   § Auscultation of Heart  § : irregular rate and rhythm, no murmurs, gallops or rubs  § Palpation of Heart  § : normal apical impulse, no cardiac thrill present  o Peripheral Vascular System  o :   § Carotid Arteries  § : normal pulses bilaterally, no bruits present  § Pedal Pulses  § : pulses 2 bilaterally  § Extremities  § : no cyanosis, clubbing or edema; less than 2 second refill noted  · Neurologic  o Mental Status Examination  o :   § Orientation  § : grossly oriented to person, place and time  o Cranial Nerves  o : cranial nerves intact and symmetric throughout  · Psychiatric  o Mood and Affect  o : mood normal, affect appropriate, denies any SI/HI          Assessment  · Anemia     285.9/D64.9  · BPH (benign prostatic hyperplasia)     600.00/N40.0  · Type 2 diabetes mellitus with stage 3a chronic kidney " disease, with long-term current use of insulin       Type 2 diabetes mellitus with diabetic chronic kidney disease     250.40/E11.22  Chronic kidney disease, stage 3a     250.40/N18.31  Long term (current) use of insulin     250.40/Z79.4  · Essential hypertension     401.9/I10  · Hyperlipidemia     272.4/E78.5  · Hypothyroidism     244.9/E03.9  · Shortness of Breath     786.05/R06.02  · Irregular heart rate     427.9/I49.9      Plan  · Orders  o Iron panel (iron, TIBC, transferrin saturation) (02975, 07753, 81414) - 285.9/D64.9 - 03/17/2021  o Hgb A1c [facility] (32609) - - 03/17/2021  o Physical, Primary Care Panel (CBC, CMP, Lipid, TSH) [facility] (32508, 47083, 94978, 79389) - - 03/17/2021  o Thyroid Profile (THYII, 68752, 71181) - - 03/17/2021  o ACO-15: Pneumococcal Vaccine Administered or Previously Received [facility] (4040F) - - 03/17/2021  o ACO-14: Influenza immunization administered or previously received [facility] () - - 03/17/2021  o ACO-19: Colorectal cancer screening results documented and reviewed (3017F) - - 03/17/2021 2/2/21  o ACO-39: Current medications updated and reviewed (1159F, ) - - 03/17/2021  o Microalbumin urine (39366) - - 03/17/2021  o Vitamin B-12 (85890) - - 03/17/2021  o Folate (Folic Acid) (70705) - - 03/17/2021  · Medications  o Medications have been Reconciled  o Transition of Care or Provider Policy  · Instructions  o Advised that cheeses and other sources of dairy fats, animal fats, fast food, and the extras (candy, pastries, pies, doughnuts and cookies) all contain LDL raising nutrients. Advised to increase fruits, vegetables, whole grains, and to monitor portion sizes.   o Take all medications as prescribed/directed.  o Patient was educated/instructed on their diagnosis, treatment and medications prior to discharge from the clinic today.  o Patient instructed to seek medical attention urgently for new or worsening symptoms.  o Call the office with any concerns or questions.  o We will set up  for AWV and also a 3 month appt. NO change in med uet. Call with any concerns or questions. We will obtain records from eye dr. If sugar is less than 6.0 we will stop the metformin and if the iron and B12 are ok we will stop or decrease the amt and go fthere.  · Disposition  o Call or Return if symptoms worsen or persist.  o Return Visit Request in/on 3 months +/- 2 days (74361).            Electronically Signed by: BRIAN Arizmendi -Author on March 17, 2021 01:38:08 PM

## 2021-05-14 NOTE — PROGRESS NOTES
"   Progress Note      Patient Name: Too Velasquez   Patient ID: [id number]   Sex: Male   YOB: 1940    Primary Care Provider: Lanie TORRES   Referring Provider: Lanie TORRES    Visit Date: January 18, 2021    Provider: BRIAN Arizmendi   Location: [facility]   Location Address: [address] [place] [address]  [id number]   Location Phone: [phone]          Chief Complaint     Left side face numb comes and goes  Last numbness noted this morning, gone at time of call  stuffy head and Headaches       History Of Present Illness  TELEHEALTH TELEPHONE VISIT  Too Velasquez is a 80 year old male who is presenting for evaluation via telehealth telephone visit. Verbal consent obtained before beginning visit.   Provider spent 11:38-11:50 minutes with patient during telehealth visit.   The following staff were present during this visit: Chio SOLIS, pt, wife, kCW   Past Medical History/Overview of Patient Symptoms  Too Velasquez is a 80 year old male who presents for evaluation and treatment of:      He said he is having stuffiness in the headache.  He will get headache and he will get some numbness in the left side of the face on and off.  He went to the eye dr last--he sent to get blood work.  He went to app--he told him to go to the heart dr and the neurology both on Weds.  He is not sleeping.  He said the \"pain comes and goes\".  Tylenol helped the pain but it is already coming back.  He is taking his blood thinner.  He said it is mainly the left side that comes and goes.  He has had laser in the eye for glaucoma.  He is not falling per pt or wife.  [facility].       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         Medication List  Name Date Started Instructions   albuterol sulfate 2.5 mg /3 mL (0.083 %) inhalation solution for nebulization 2021 inhale 3 milliliters (2.5 mg) by nebulization route 3 times per day dx pneumonia   amlodipine 5 mg oral tablet  take 1 tablet (5 mg) by oral route once daily   diltiazem HCl 240 mg oral capsule,extended release 24 hr  take 1 capsule (240 mg) by oral route once daily   Eliquis 2.5 mg oral tablet  take 1 tablet (2.5 mg) by oral route 2 times per day   finasteride 5 mg oral tablet  take 1 tablet (5 mg) by oral route once daily   folic acid 400 mcg oral tablet 2020 take 1 tablet (0.4 mg) by oral route once daily for 90 days   Iron (ferrous sulfate) 325 mg (65 mg iron) oral tablet 2020 take 1 tablet (325 mg) by oral route 2 times per day for 30 days   Lantus U-100 Insulin 100 unit/mL subcutaneous solution 2021 30 units daily SQ   lisinopril 20 mg oral tablet  take 1 tablet (20 mg) by oral route once daily   metformin 500 mg oral tablet  take 1 tablet (500 mg) by oral route 2 times per day with morning and evening meals   Plenvu 140-9-5.2 gram oral powder in packet, sequential 2021 take by oral route as directed per package instructions for 1 day   pravastatin 80 mg oral tablet  take 1 tablet (80 mg) by oral route once daily at bedtime   tamsulosin 0.4 mg oral capsule  take 1 capsule (0.4 mg) by oral route once daily 1/2 hour following the same meal each day         Allergy List  Allergen Name Date Reaction Notes   PENICILLINS --  --  --        Allergies Reconciled  Family Medical History  Disease Name Relative/Age Notes   Heart Disease Father/  Mother/    age 38  age 70   Diabetes Mellitus, Type II Mother/  Sister/   --          Social History  Finding Status Start/Stop Quantity Notes   Alcohol Never --/-- --   --     --  --/-- --  --    Retired [employer] --  --/-- --  --    Tobacco Never --/-- --  --          Immunizations  NameDate Admin Mfg Trade Name Lot Number Route Inj VIS Given VIS Publication   Slkmgupsz37/01/2020 SKB Fluarix, quadrivalent, preservative free  NE NE 12/23/2020    Comments:    Prevnar 1310/20/2018 NE Not Entered  NE NE 12/23/2020    Comments:          Review of Systems  · Constitutional  o Admits  o : fatigue  o Denies  o : fever, weight loss, weight gain  · Eyes  o Denies  o : discharge from eye, eye discomfort, eye pain  · HENT  o Admits  o : headaches  o Denies  o : vertigo, nasal congestion, sore throat, ear pain  · Cardiovascular  o Denies  o : lower extremity edema, claudication, chest pressure, palpitations  · Respiratory  o Denies  o : shortness of breath, wheezing, cough, hemoptysis, dyspnea on exertion  · Gastrointestinal  o Denies  o : nausea, vomiting, diarrhea, constipation, abdominal pain              Assessment  · Headache     784.0/R51  · Glaucoma     365.9/H40.9  · Ocular ischemic syndrome     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  o Call the office with any concerns or questions.  o Discussed with pt and wife in depth that they have neuro and cardio appt this week. If any increased headache, weakness to the ER. Discussed about sleeping dep. causing come of the tiredness. He will trial melatonin 5mg 1 hour before bed. He will keep f.u appt for neuro and cardio(he is unsure what for).  I have requested records. When I looked in [employer] he had elevated lab levels. (CRP/sed). We will have him take Tylenol 1000mg every 8 hours until seeing neuro and cardo unless he goes to the ER prior to Weds. Discussed that neuro will order scans and if they do not we will then proceed with them  · Disposition  o Call or Return if symptoms worsen or persist.            Electronically Signed by: BRIAN Arizmendi -Author on January 18, 2021 12:44:59 PM

## 2021-05-20 ENCOUNTER — OFFICE VISIT CONVERTED (OUTPATIENT)
Dept: GASTROENTEROLOGY | Facility: CLINIC | Age: 81
End: 2021-05-20
Attending: NURSE PRACTITIONER

## 2021-06-05 NOTE — PROGRESS NOTES
Progress Note      Patient Name: Too Velasquez   Patient ID: [id number]   Sex: Male   YOB: 1940    Primary Care Provider: Lanie TORRES   Referring Provider: Lanie TORRES    Visit Date: May 20, 2021    Provider: BRIAN Dunn   Location: [facility]   Location Address: [address] [place] [address], [address] [place] [address]  [id number]   Location Phone: [phone]          Chief Complaint  · Follow Up Anemia      History Of Present Illness     Mr. Velasquez presents for follow-up of iron deficiency anemia.    2/2/2021 EGD-colonoscopy: Normal mucosa in the whole esophagus.  Small hiatal hernia.  Patchy erythema in the antrum, biopsy - inactive gastritis.  2 cm submucosal lesion visualized in the gastric fundus.    Normal duodenum.  Mild diverticulosis in the sigmoid colon.  Single small angiectasia in the cecum without bleeding.  Grade 1 internal hemorrhoids. Recommend EUS to further evaluate submucosal lesion in stomach.    He was unable to get EUS done due to transportation.  He's had several eye procedures over the past 5 months and is unable to drive.      Since colonoscopy reports frequent diarrhea.  Denies abdominal pain.                 Past Medical History  BPH (benign prostatic hyperplasia); Diabetes mellitus; Essential hypertension; Glaucoma; Headache; High cholesterol; Hyperlipidemia; Hypertension; Insomnia; Irregular heart rate; Shortness of Breath; Sinusitis         Past Surgical History  Cataract surgery; Tumor removal         Medication List  albuterol sulfate 2.5 mg /3 mL (0.083 %) inhalation solution for nebulization; amlodipine 5 mg oral tablet; diltiazem HCl 240 mg oral capsule,extended release 24 hr; Eliquis 2.5 mg oral tablet; finasteride 5 mg oral tablet; folic acid 400 mcg oral tablet; Iron (ferrous sulfate) 325 mg (65 mg iron) oral tablet; Lantus U-100 Insulin 100 unit/mL subcutaneous solution; lisinopril 20 mg oral tablet;  "metformin 500 mg oral tablet; pravastatin 80 mg oral tablet; Synthroid 25 mcg oral tablet; tamsulosin 0.4 mg oral capsule         Allergy List  PENICILLINS       Allergies Reconciled  Family Medical History  Family history of heart disease; Family history of diabetes mellitus         Social History  Alcohol (Never); ; Retired [employer]; Tobacco (Never)         Immunizations  Name Date Admin   COVID Moderna 02/26/2021   COVID Moderna 01/29/2021   Influenza 10/01/2020   Prevnar 13 10/20/2018         Review of Systems  · Constitutional  o Denies  o : chills, fever  · Cardiovascular  o Denies  o : chest pain, irregular heart beats  · Respiratory  o Denies  o : cough, shortness of breath  · Gastrointestinal  o Admits  o : see HPI   · Endocrine  o Denies  o : weight gain, weight loss      Vitals  Date Time BP Position Site L\R Cuff Size HR RR TEMP (F) WT  HT  BMI kg/m2 BSA m2 O2 Sat FR L/min FiO2 HC       05/20/2021 01:36 /64 Sitting    65 - R  96.9 196lbs 6oz 5'  10\" 28.18 2.1             Physical Examination  · Constitutional  o Appearance  o : Healthy-appearing, awake and alert in no acute distress  · Head and Face  o Head  o : Normocephalic with no worriesome skin lesions  · Eyes  o Vision  o :   § Visual Fields  § : eyes move symmetrical in all directions  o Sclerae  o : sclerae anicteric  o Pupils and Irises  o : pupils equal and symmetrical  · Neck  o Inspection/Palpation  o : Trachea is midline, no adenopathy  · Respiratory  o Respiratory Effort  o : Breathing is unlabored.  o Inspection of Chest  o : normal appearance  o Auscultation of Lungs  o : Chest is clear to auscultation bilaterally.  · Cardiovascular  o Heart  o :   § Auscultation of Heart  § : no murmurs, rubs, or gallops  o Peripheral Vascular System  o :   § Extremities  § : no cyanosis, clubbing or edema;   · Gastrointestinal  o Abdominal Examination  o : Abdomen is soft, nontender to palpation, with normal active bowel sounds, no appreciable " hepatosplenomegaly.  o Digital Rectal Exam  o : deferred  · Skin and Subcutaneous Tissue  o General Inspection  o : without focal lesions; turgor is normal  · Psychiatric  o General  o : Alert and oriented x3  o Mood and Affect  o : Mood and affect are appropriate to circumstances  · Extremities  o Extremities  o : No edema, no cyanosis          Assessment  · Other iron deficiency anemia     280.8/D50.8  · Diarrhea     787.91/R19.7  · Submucosal lesion of stomach     537.89/K31.89      Plan  · Orders  o Capsule endoscopy esophagus through ileum [facility] (14860) - - 05/20/2021  · Medications  o Florajen3 460 mg (7.5-6- 1.5 bill. cell) oral capsule   SIG: take 1 capsule by oral route daily for 30 days   DISP: (30) Capsule with 2 refills  Prescribed on 05/20/2021     o Medications have been Reconciled  o Transition of Care or Provider Policy  · Instructions  o Information given on current diagnoses.  · Disposition  o Follow up 3 months            Electronically Signed by: BRIAN Dunn -Author on May 20, 2021 03:29:13 PM

## 2021-06-08 ENCOUNTER — OFFICE VISIT (OUTPATIENT)
Dept: FAMILY MEDICINE CLINIC | Facility: CLINIC | Age: 81
End: 2021-06-08

## 2021-06-08 VITALS
DIASTOLIC BLOOD PRESSURE: 72 MMHG | WEIGHT: 199.1 LBS | HEIGHT: 70 IN | BODY MASS INDEX: 28.5 KG/M2 | OXYGEN SATURATION: 98 % | HEART RATE: 74 BPM | SYSTOLIC BLOOD PRESSURE: 138 MMHG | RESPIRATION RATE: 20 BRPM | TEMPERATURE: 98.2 F

## 2021-06-08 DIAGNOSIS — I10 ESSENTIAL HYPERTENSION: ICD-10-CM

## 2021-06-08 DIAGNOSIS — I48.91 ATRIAL FIBRILLATION, UNSPECIFIED TYPE (HCC): Primary | ICD-10-CM

## 2021-06-08 DIAGNOSIS — D50.8 OTHER IRON DEFICIENCY ANEMIA: ICD-10-CM

## 2021-06-08 DIAGNOSIS — G47.00 INSOMNIA, UNSPECIFIED TYPE: ICD-10-CM

## 2021-06-08 DIAGNOSIS — N18.31 STAGE 3A CHRONIC KIDNEY DISEASE (HCC): ICD-10-CM

## 2021-06-08 DIAGNOSIS — E03.9 ACQUIRED HYPOTHYROIDISM: ICD-10-CM

## 2021-06-08 DIAGNOSIS — Z79.4 TYPE 2 DIABETES MELLITUS WITHOUT COMPLICATION, WITH LONG-TERM CURRENT USE OF INSULIN (HCC): ICD-10-CM

## 2021-06-08 DIAGNOSIS — E55.9 VITAMIN D DEFICIENCY: ICD-10-CM

## 2021-06-08 DIAGNOSIS — E78.2 MIXED HYPERLIPIDEMIA: ICD-10-CM

## 2021-06-08 DIAGNOSIS — N40.0 BENIGN PROSTATIC HYPERPLASIA WITHOUT LOWER URINARY TRACT SYMPTOMS: ICD-10-CM

## 2021-06-08 DIAGNOSIS — E11.9 TYPE 2 DIABETES MELLITUS WITHOUT COMPLICATION, WITH LONG-TERM CURRENT USE OF INSULIN (HCC): ICD-10-CM

## 2021-06-08 PROBLEM — N18.30 STAGE 3 CHRONIC KIDNEY DISEASE: Status: ACTIVE | Noted: 2017-11-15

## 2021-06-08 PROBLEM — H40.9 GLAUCOMA: Status: ACTIVE | Noted: 2021-06-08

## 2021-06-08 PROBLEM — E78.5 HYPERLIPIDEMIA: Status: ACTIVE | Noted: 2017-11-15

## 2021-06-08 LAB
25(OH)D3 SERPL-MCNC: 20.6 NG/ML
ALBUMIN SERPL-MCNC: 4.5 G/DL (ref 3.5–5.2)
ALBUMIN/GLOB SERPL: 1.6 G/DL
ALP SERPL-CCNC: 78 U/L (ref 39–117)
ALT SERPL W P-5'-P-CCNC: 11 U/L (ref 1–41)
ANION GAP SERPL CALCULATED.3IONS-SCNC: 9.4 MMOL/L (ref 5–15)
AST SERPL-CCNC: 12 U/L (ref 1–40)
BASOPHILS # BLD AUTO: 0.04 10*3/MM3 (ref 0–0.2)
BASOPHILS NFR BLD AUTO: 0.5 % (ref 0–1.5)
BILIRUB SERPL-MCNC: <0.2 MG/DL (ref 0–1.2)
BUN SERPL-MCNC: 28 MG/DL (ref 8–23)
BUN/CREAT SERPL: 12.2 (ref 7–25)
CALCIUM SPEC-SCNC: 9 MG/DL (ref 8.6–10.5)
CHLORIDE SERPL-SCNC: 109 MMOL/L (ref 98–107)
CHOLEST SERPL-MCNC: 154 MG/DL (ref 0–200)
CO2 SERPL-SCNC: 22.6 MMOL/L (ref 22–29)
CREAT SERPL-MCNC: 2.3 MG/DL (ref 0.76–1.27)
DEPRECATED RDW RBC AUTO: 42.9 FL (ref 37–54)
EOSINOPHIL # BLD AUTO: 0.07 10*3/MM3 (ref 0–0.4)
EOSINOPHIL NFR BLD AUTO: 1 % (ref 0.3–6.2)
ERYTHROCYTE [DISTWIDTH] IN BLOOD BY AUTOMATED COUNT: 13.6 % (ref 12.3–15.4)
GFR SERPL CREATININE-BSD FRML MDRD: 27 ML/MIN/1.73
GFR SERPL CREATININE-BSD FRML MDRD: 33 ML/MIN/1.73
GLOBULIN UR ELPH-MCNC: 2.9 GM/DL
GLUCOSE SERPL-MCNC: 87 MG/DL (ref 65–99)
HBA1C MFR BLD: 6.96 % (ref 4.8–5.6)
HCT VFR BLD AUTO: 36.5 % (ref 37.5–51)
HDLC SERPL-MCNC: 46 MG/DL (ref 40–60)
HGB BLD-MCNC: 11.7 G/DL (ref 13–17.7)
IMM GRANULOCYTES # BLD AUTO: 0.02 10*3/MM3 (ref 0–0.05)
IMM GRANULOCYTES NFR BLD AUTO: 0.3 % (ref 0–0.5)
IRON 24H UR-MRATE: 58 MCG/DL (ref 59–158)
IRON SATN MFR SERPL: 20 % (ref 20–50)
LDLC SERPL CALC-MCNC: 87 MG/DL (ref 0–100)
LDLC/HDLC SERPL: 1.85 {RATIO}
LYMPHOCYTES # BLD AUTO: 2.54 10*3/MM3 (ref 0.7–3.1)
LYMPHOCYTES NFR BLD AUTO: 34.8 % (ref 19.6–45.3)
MCH RBC QN AUTO: 27.9 PG (ref 26.6–33)
MCHC RBC AUTO-ENTMCNC: 32.1 G/DL (ref 31.5–35.7)
MCV RBC AUTO: 86.9 FL (ref 79–97)
MONOCYTES # BLD AUTO: 0.65 10*3/MM3 (ref 0.1–0.9)
MONOCYTES NFR BLD AUTO: 8.9 % (ref 5–12)
NEUTROPHILS NFR BLD AUTO: 3.98 10*3/MM3 (ref 1.7–7)
NEUTROPHILS NFR BLD AUTO: 54.5 % (ref 42.7–76)
NRBC BLD AUTO-RTO: 0 /100 WBC (ref 0–0.2)
PLATELET # BLD AUTO: 205 10*3/MM3 (ref 140–450)
PMV BLD AUTO: 12 FL (ref 6–12)
POTASSIUM SERPL-SCNC: 4.2 MMOL/L (ref 3.5–5.2)
PROT SERPL-MCNC: 7.4 G/DL (ref 6–8.5)
RBC # BLD AUTO: 4.2 10*6/MM3 (ref 4.14–5.8)
SODIUM SERPL-SCNC: 141 MMOL/L (ref 136–145)
TIBC SERPL-MCNC: 292 MCG/DL (ref 298–536)
TRANSFERRIN SERPL-MCNC: 196 MG/DL (ref 200–360)
TRIGL SERPL-MCNC: 114 MG/DL (ref 0–150)
TSH SERPL DL<=0.05 MIU/L-ACNC: 3.82 UIU/ML (ref 0.27–4.2)
VLDLC SERPL-MCNC: 21 MG/DL (ref 5–40)
WBC # BLD AUTO: 7.3 10*3/MM3 (ref 3.4–10.8)

## 2021-06-08 PROCEDURE — 82306 VITAMIN D 25 HYDROXY: CPT | Performed by: NURSE PRACTITIONER

## 2021-06-08 PROCEDURE — 85025 COMPLETE CBC W/AUTO DIFF WBC: CPT | Performed by: NURSE PRACTITIONER

## 2021-06-08 PROCEDURE — 83540 ASSAY OF IRON: CPT | Performed by: NURSE PRACTITIONER

## 2021-06-08 PROCEDURE — G0439 PPPS, SUBSEQ VISIT: HCPCS | Performed by: NURSE PRACTITIONER

## 2021-06-08 PROCEDURE — 80053 COMPREHEN METABOLIC PANEL: CPT | Performed by: NURSE PRACTITIONER

## 2021-06-08 PROCEDURE — 84443 ASSAY THYROID STIM HORMONE: CPT | Performed by: NURSE PRACTITIONER

## 2021-06-08 PROCEDURE — 83036 HEMOGLOBIN GLYCOSYLATED A1C: CPT | Performed by: NURSE PRACTITIONER

## 2021-06-08 PROCEDURE — 84466 ASSAY OF TRANSFERRIN: CPT | Performed by: NURSE PRACTITIONER

## 2021-06-08 PROCEDURE — 80061 LIPID PANEL: CPT | Performed by: NURSE PRACTITIONER

## 2021-06-08 RX ORDER — INSULIN GLARGINE 100 [IU]/ML
INJECTION, SOLUTION SUBCUTANEOUS
COMMUNITY
Start: 2021-02-09 | End: 2021-06-08 | Stop reason: SDUPTHER

## 2021-06-08 RX ORDER — DORZOLAMIDE HYDROCHLORIDE AND TIMOLOL MALEATE 20; 5 MG/ML; MG/ML
SOLUTION/ DROPS OPHTHALMIC
COMMUNITY
Start: 2021-06-07

## 2021-06-08 RX ORDER — TAMSULOSIN HYDROCHLORIDE 0.4 MG/1
CAPSULE ORAL
COMMUNITY
End: 2021-06-08 | Stop reason: SDUPTHER

## 2021-06-08 RX ORDER — FERROUS SULFATE 325(65) MG
1 TABLET ORAL 2 TIMES DAILY
Qty: 180 TABLET | Refills: 1 | Status: SHIPPED | OUTPATIENT
Start: 2021-06-08 | End: 2021-12-02 | Stop reason: SDUPTHER

## 2021-06-08 RX ORDER — LANOLIN ALCOHOL/MO/W.PET/CERES
CREAM (GRAM) TOPICAL
COMMUNITY
Start: 2020-12-28 | End: 2021-06-08 | Stop reason: SDUPTHER

## 2021-06-08 RX ORDER — BRIMONIDINE TARTRATE 0.1 %
DROPS OPHTHALMIC (EYE)
COMMUNITY
Start: 2021-06-07

## 2021-06-08 RX ORDER — FINASTERIDE 5 MG/1
TABLET, FILM COATED ORAL
COMMUNITY
Start: 2021-02-18 | End: 2021-06-08 | Stop reason: SDUPTHER

## 2021-06-08 RX ORDER — PRAVASTATIN SODIUM 80 MG/1
TABLET ORAL
COMMUNITY
Start: 2021-03-25 | End: 2021-06-08 | Stop reason: SDUPTHER

## 2021-06-08 RX ORDER — INSULIN GLARGINE 100 [IU]/ML
30 INJECTION, SOLUTION SUBCUTANEOUS NIGHTLY
Qty: 9 ML | Refills: 1 | Status: SHIPPED | OUTPATIENT
Start: 2021-06-08 | End: 2021-09-08 | Stop reason: SDUPTHER

## 2021-06-08 RX ORDER — AMLODIPINE BESYLATE 5 MG/1
5 TABLET ORAL 2 TIMES DAILY
Qty: 180 TABLET | Refills: 1 | Status: SHIPPED | OUTPATIENT
Start: 2021-06-08 | End: 2021-09-06

## 2021-06-08 RX ORDER — DILTIAZEM HYDROCHLORIDE 240 MG/1
CAPSULE, COATED, EXTENDED RELEASE ORAL
COMMUNITY
Start: 2021-06-07 | End: 2021-06-08 | Stop reason: ALTCHOICE

## 2021-06-08 RX ORDER — LEVOTHYROXINE SODIUM 0.03 MG/1
25 TABLET ORAL DAILY
Qty: 90 TABLET | Refills: 1 | Status: SHIPPED | OUTPATIENT
Start: 2021-06-08 | End: 2021-12-02 | Stop reason: SDUPTHER

## 2021-06-08 RX ORDER — AMLODIPINE BESYLATE 5 MG/1
5 TABLET ORAL 2 TIMES DAILY
COMMUNITY
Start: 2021-03-05 | End: 2021-06-08 | Stop reason: SDUPTHER

## 2021-06-08 RX ORDER — LEVOTHYROXINE SODIUM 0.03 MG/1
TABLET ORAL
COMMUNITY
Start: 2021-05-03 | End: 2021-06-08 | Stop reason: SDUPTHER

## 2021-06-08 RX ORDER — DILTIAZEM HYDROCHLORIDE 240 MG/1
CAPSULE, EXTENDED RELEASE ORAL
COMMUNITY
End: 2021-06-08 | Stop reason: SDUPTHER

## 2021-06-08 RX ORDER — LISINOPRIL 20 MG/1
20 TABLET ORAL DAILY
Qty: 90 TABLET | Refills: 1 | Status: SHIPPED | OUTPATIENT
Start: 2021-06-08 | End: 2021-12-02 | Stop reason: SDUPTHER

## 2021-06-08 RX ORDER — FINASTERIDE 5 MG/1
5 TABLET, FILM COATED ORAL DAILY
Qty: 90 TABLET | Refills: 1 | Status: SHIPPED | OUTPATIENT
Start: 2021-06-08 | End: 2021-09-06

## 2021-06-08 RX ORDER — LANOLIN ALCOHOL/MO/W.PET/CERES
400 CREAM (GRAM) TOPICAL DAILY
Qty: 90 TABLET | Refills: 1 | Status: SHIPPED | OUTPATIENT
Start: 2021-06-08 | End: 2021-09-06

## 2021-06-08 RX ORDER — PEN NEEDLE, DIABETIC 29 G X1/2"
NEEDLE, DISPOSABLE MISCELLANEOUS
COMMUNITY
Start: 2021-06-07 | End: 2021-09-08 | Stop reason: SDUPTHER

## 2021-06-08 RX ORDER — TAMSULOSIN HYDROCHLORIDE 0.4 MG/1
1 CAPSULE ORAL DAILY
Qty: 90 CAPSULE | Refills: 1 | Status: SHIPPED | OUTPATIENT
Start: 2021-06-08 | End: 2021-09-06

## 2021-06-08 RX ORDER — FERROUS SULFATE 325(65) MG
1 TABLET ORAL 2 TIMES DAILY
COMMUNITY
Start: 2021-03-20 | End: 2021-06-08 | Stop reason: SDUPTHER

## 2021-06-08 RX ORDER — LISINOPRIL 20 MG/1
TABLET ORAL
COMMUNITY
Start: 2021-05-03 | End: 2021-06-08 | Stop reason: SDUPTHER

## 2021-06-08 RX ORDER — PRAVASTATIN SODIUM 80 MG/1
80 TABLET ORAL NIGHTLY
Qty: 90 TABLET | Refills: 1 | Status: SHIPPED | OUTPATIENT
Start: 2021-06-08 | End: 2021-12-02 | Stop reason: SDUPTHER

## 2021-06-08 NOTE — PROGRESS NOTES
"The ABCs of the Annual Wellness Visit  Subsequent Medicare Wellness Visit    Chief Complaint   Patient presents with   • Annual Exam   • Hyperlipidemia   • Hypertension       Subjective   History of Present Illness:  Too Velasquez is a 80 y.o. male who presents for a Subsequent Medicare Wellness Visit.    HEALTH RISK ASSESSMENT    Recent Hospitalizations:  {Hospitalization history:5324093997::\"No hospitalization(s) within the last year.\"}    Current Medical Providers:  Patient Care Team:  Lanie Murillo APRN as PCP - General (Nurse Practitioner)    Smoking Status:  Social History     Tobacco Use   Smoking Status Never Smoker   Smokeless Tobacco Never Used       Alcohol Consumption:  Social History     Substance and Sexual Activity   Alcohol Use Never       Depression Screen:   PHQ-2/PHQ-9 Depression Screening 6/8/2021   Little interest or pleasure in doing things 0   Feeling down, depressed, or hopeless 0   Total Score 0       Fall Risk Screen:  ELVIRA Fall Risk Assessment has not been completed.    Health Habits and Functional and Cognitive Screening:  No flowsheet data found.      Does the patient have evidence of cognitive impairment? {Yes/No w/ pre-defaulted No:18942::\"No\"}    Asprin use counseling:{Aspirin :39876}    Age-appropriate Screening Schedule:  Refer to the list below for future screening recommendations based on patient's age, sex and/or medical conditions. Orders for these recommended tests are listed in the plan section. The patient has been provided with a written plan.    Health Maintenance   Topic Date Due   • TDAP/TD VACCINES (1 - Tdap) Never done   • ZOSTER VACCINE (1 of 2) Never done   • INFLUENZA VACCINE  08/01/2021   • LIPID PANEL  03/17/2022          The following portions of the patient's history were reviewed and updated as appropriate: {history reviewed:20406::\"allergies\",\"current medications\",\"past family history\",\"past medical history\",\"past social history\",\"past " "surgical history\",\"problem list\"}.    Outpatient Medications Prior to Visit   Medication Sig Dispense Refill   • Alphagan P 0.1 % solution ophthalmic solution      • amLODIPine (NORVASC) 5 MG tablet Take 5 mg by mouth 2 (Two) Times a Day.     • BD Insulin Syringe U/F 31G X 5/16\" 0.5 ML misc      • Cardizem  MG 24 hr capsule      • dilTIAZem (TIAZAC) 240 MG 24 hr capsule diltiazem HCl 240 mg oral capsule,extended release 24 hr take 1 capsule (240 mg) by oral route once daily   Active     • dorzolamide-timolol (COSOPT) 22.3-6.8 MG/ML ophthalmic solution      • FeroSul 325 (65 Fe) MG tablet Take 1 tablet by mouth 2 (Two) Times a Day.     • finasteride (PROSCAR) 5 MG tablet finasteride 5 mg oral tablet take 1 tablet (5 mg) by oral route once daily 2/18/2021  Active     • folic acid (FOLVITE) 400 MCG tablet folic acid 400 mcg oral tablet take 1 tablet (0.4 mg) by oral route once daily for 90 days 12/28/2020  Active     • insulin glargine (Lantus) 100 UNIT/ML injection Lantus U-100 Insulin 100 unit/mL subcutaneous solution 30 units daily SQ 2/9/2021  Active     • levothyroxine (Synthroid) 25 MCG tablet Synthroid 25 mcg oral tablet take 1 tablet (25 mcg) by oral route once daily on an empty stomach 30 minutes before breakfast for 90 days 5/3/2021  Active     • lisinopril (PRINIVIL,ZESTRIL) 20 MG tablet lisinopril 20 mg oral tablet take 1 tablet (20 mg) by oral route once daily 5/3/2021  Active     • metFORMIN (GLUCOPHAGE) 500 MG tablet metformin 500 mg oral tablet take 1 tablet (500 mg) by oral route 2 times per day with morning and evening meals 3/9/2021  Active     • pravastatin (PRAVACHOL) 80 MG tablet pravastatin 80 mg oral tablet take 1 tablet (80 mg) by oral route once daily at bedtime 3/25/2021  Active     • tamsulosin (FLOMAX) 0.4 MG capsule 24 hr capsule tamsulosin 0.4 mg oral capsule take 1 capsule (0.4 mg) by oral route once daily 1/2 hour following the same meal each day   Active       No " "facility-administered medications prior to visit.       There is no problem list on file for this patient.      Advanced Care Planning:  {Advanced Directive Status:49870}    Review of Systems    Compared to one year ago, the patient feels his physical health is {better worse same:32711}.  Compared to one year ago, the patient feels his mental health is {better worse same:91938}.    Reviewed chart for potential of high risk medication in the elderly: {Response;Yes/No/NA:0504923245::\"yes\"}  Reviewed chart for potential of harmful drug interactions in the elderly:{Response;Yes/No/NA:4548003804::\"yes\"}    Objective         Vitals:    06/08/21 1313   BP: 138/72   Pulse: 74   Resp: 20   Temp: 98.2 °F (36.8 °C)   SpO2: 98%   Weight: 90.3 kg (199 lb 1.6 oz)   Height: 177.8 cm (70\")   PainSc:   4   PainLoc: Breast       Body mass index is 28.57 kg/m².  Discussed the patient's BMI with him. The BMI {BMI plan ([facility] measure 421):07935}.    Physical Exam    Lab Results   Component Value Date    GLU 99 03/17/2021    CHLPL 134 03/17/2021    TRIG 117 03/17/2021    HDL 47 03/17/2021    LDL 64 (L) 03/17/2021    VLDL 23 03/17/2021    HGBA1C 6.8 (H) 03/17/2021        Assessment/Plan   Medicare Risks and Personalized Health Plan  CMS Preventative Services Quick Reference  {Medicare Wellness Risk Factors and Personalized Health Plan:37608}    The above risks/problems have been discussed with the patient.  Pertinent information has been shared with the patient in the After Visit Summary.  Follow up plans and orders are seen below in the Assessment/Plan Section.    There are no diagnoses linked to this encounter.  Follow Up:  No follow-ups on file.     An After Visit Summary and PPPS were given to the patient.               "

## 2021-06-08 NOTE — PROGRESS NOTES
The ABCs of the Annual Wellness Visit  Subsequent Medicare Wellness Visit    Chief Complaint   Patient presents with   • Annual Exam   • Hyperlipidemia   • Hypertension       Subjective   History of Present Illness:  Too Velasquez is a 80 y.o. male who presents for a Subsequent Medicare Wellness Visit.  He is also here for refills.  I filled everything with courtesy since pt is doing AWV.  No med changes needed and pt is doing well with current medications    HEALTH RISK ASSESSMENT    Recent Hospitalizations:  No hospitalization(s) within the last year.    Current Medical Providers:  Patient Care Team:  Lanie Murillo APRN as PCP - General (Nurse Practitioner)    Smoking Status:  Social History     Tobacco Use   Smoking Status Never Smoker   Smokeless Tobacco Never Used       Alcohol Consumption:  Social History     Substance and Sexual Activity   Alcohol Use Never       Depression Screen:   PHQ-2/PHQ-9 Depression Screening 6/8/2021   Little interest or pleasure in doing things 0   Feeling down, depressed, or hopeless 0   Total Score 0       Fall Risk Screen:  ELVIRA Fall Risk Assessment was completed, and patient is at LOW risk for falls.Assessment completed on:6/8/2021    Health Habits and Functional and Cognitive Screening:  Functional & Cognitive Status 6/8/2021   Do you have difficulty preparing food and eating? No   Do you have difficulty bathing yourself, getting dressed or grooming yourself? No   Do you have difficulty using the toilet? No   Do you have difficulty moving around from place to place? No   Do you have trouble with steps or getting out of a bed or a chair? No   Current Diet Well Balanced Diet   Dental Exam Up to date   Eye Exam Up to date   Exercise (times per week) 0 times per week   Current Exercise Activities Include None   Do you need help using the phone?  No   Are you deaf or do you have serious difficulty hearing?  No   Do you need help with transportation? No   Do you need help  "shopping? No   Do you need help preparing meals?  No   Do you need help with housework?  No   Do you need help with laundry? No   Do you need help taking your medications? No   Do you need help managing money? No   Do you ever drive or ride in a car without wearing a seat belt? No         Does the patient have evidence of cognitive impairment? No    Asprin use counseling:Does not need ASA (and currently is not on it)    Age-appropriate Screening Schedule:  Refer to the list below for future screening recommendations based on patient's age, sex and/or medical conditions. Orders for these recommended tests are listed in the plan section. The patient has been provided with a written plan.    Health Maintenance   Topic Date Due   • TDAP/TD VACCINES (1 - Tdap) Never done   • ZOSTER VACCINE (1 of 2) Never done   • INFLUENZA VACCINE  08/01/2021   • LIPID PANEL  03/17/2022          The following portions of the patient's history were reviewed and updated as appropriate: allergies, current medications, past family history, past medical history, past social history, past surgical history and problem list.    Outpatient Medications Prior to Visit   Medication Sig Dispense Refill   • Alphagan P 0.1 % solution ophthalmic solution      • BD Insulin Syringe U/F 31G X 5/16\" 0.5 ML misc      • dorzolamide-timolol (COSOPT) 22.3-6.8 MG/ML ophthalmic solution      • amLODIPine (NORVASC) 5 MG tablet Take 5 mg by mouth 2 (Two) Times a Day.     • Cardizem  MG 24 hr capsule      • dilTIAZem (TIAZAC) 240 MG 24 hr capsule diltiazem HCl 240 mg oral capsule,extended release 24 hr take 1 capsule (240 mg) by oral route once daily   Active     • FeroSul 325 (65 Fe) MG tablet Take 1 tablet by mouth 2 (Two) Times a Day.     • finasteride (PROSCAR) 5 MG tablet finasteride 5 mg oral tablet take 1 tablet (5 mg) by oral route once daily 2/18/2021  Active     • folic acid (FOLVITE) 400 MCG tablet folic acid 400 mcg oral tablet take 1 tablet (0.4 " mg) by oral route once daily for 90 days 12/28/2020  Active     • insulin glargine (Lantus) 100 UNIT/ML injection Lantus U-100 Insulin 100 unit/mL subcutaneous solution 30 units daily SQ 2/9/2021  Active     • levothyroxine (Synthroid) 25 MCG tablet Synthroid 25 mcg oral tablet take 1 tablet (25 mcg) by oral route once daily on an empty stomach 30 minutes before breakfast for 90 days 5/3/2021  Active     • lisinopril (PRINIVIL,ZESTRIL) 20 MG tablet lisinopril 20 mg oral tablet take 1 tablet (20 mg) by oral route once daily 5/3/2021  Active     • metFORMIN (GLUCOPHAGE) 500 MG tablet metformin 500 mg oral tablet take 1 tablet (500 mg) by oral route 2 times per day with morning and evening meals 3/9/2021  Active     • pravastatin (PRAVACHOL) 80 MG tablet pravastatin 80 mg oral tablet take 1 tablet (80 mg) by oral route once daily at bedtime 3/25/2021  Active     • tamsulosin (FLOMAX) 0.4 MG capsule 24 hr capsule tamsulosin 0.4 mg oral capsule take 1 capsule (0.4 mg) by oral route once daily 1/2 hour following the same meal each day   Active       No facility-administered medications prior to visit.       Patient Active Problem List   Diagnosis   • Atrial fibrillation (CMS/HCC)   • Benign prostatic hyperplasia without lower urinary tract symptoms   • Essential hypertension   • Glaucoma   • Hyperlipidemia   • Insomnia   • Stage 3 chronic kidney disease (CMS/HCC)   • Type 2 diabetes mellitus without complication (CMS/HCC)   • Vitamin D deficiency   • Iron deficiency anemia secondary to inadequate dietary iron intake       Advanced Care Planning:  ACP discussion was declined by the patient. Patient does not have an advance directive, declines further assistance.    Review of Systems    Compared to one year ago, the patient feels his physical health is better.  Compared to one year ago, the patient feels his mental health is better.    Reviewed chart for potential of high risk medication in the elderly: yes  Reviewed chart  "for potential of harmful drug interactions in the elderly:yes    Objective         Vitals:    06/08/21 1313   BP: 138/72   Pulse: 74   Resp: 20   Temp: 98.2 °F (36.8 °C)   SpO2: 98%   Weight: 90.3 kg (199 lb 1.6 oz)   Height: 177.8 cm (70\")   PainSc:   4   PainLoc: Breast       Body mass index is 28.57 kg/m².  Discussed the patient's BMI with him. The BMI is above average; no BMI management plan is appropriate..    Physical Exam  Vitals reviewed.   Constitutional:       Appearance: Normal appearance. He is well-developed.   HENT:      Mouth/Throat:      Pharynx: No oropharyngeal exudate.   Eyes:      Conjunctiva/sclera: Conjunctivae normal.      Pupils: Pupils are equal, round, and reactive to light.   Neck:      Vascular: No carotid bruit or JVD.      Trachea: Trachea normal.   Cardiovascular:      Rate and Rhythm: Normal rate and regular rhythm.      Heart sounds: No murmur heard.   No friction rub. No gallop.    Pulmonary:      Effort: Pulmonary effort is normal.      Breath sounds: Normal breath sounds. No wheezing or rhonchi.   Musculoskeletal:      Right lower leg: No edema.      Left lower leg: No edema.   Lymphadenopathy:      Cervical: No cervical adenopathy.   Skin:     General: Skin is warm and dry.   Neurological:      Mental Status: He is alert and oriented to person, place, and time.      Cranial Nerves: No cranial nerve deficit.   Psychiatric:         Mood and Affect: Mood and affect normal.         Behavior: Behavior normal.         Thought Content: Thought content normal.         Judgment: Judgment normal.         Lab Results   Component Value Date    GLU 99 03/17/2021    CHLPL 134 03/17/2021    TRIG 117 03/17/2021    HDL 47 03/17/2021    LDL 64 (L) 03/17/2021    VLDL 23 03/17/2021    HGBA1C 6.8 (H) 03/17/2021        Assessment/Plan   Medicare Risks and Personalized Health Plan  CMS Preventative Services Quick Reference      The above risks/problems have been discussed with the patient.  Pertinent " information has been shared with the patient in the After Visit Summary.  Follow up plans and orders are seen below in the Assessment/Plan Section.    Diagnoses and all orders for this visit:    1. Atrial fibrillation, unspecified type (CMS/[facility]) (Primary)    2. Essential hypertension  -     lisinopril (PRINIVIL,ZESTRIL) 20 MG tablet; Take 1 tablet by mouth Daily for 90 days.  Dispense: 90 tablet; Refill: 1  -     amLODIPine (NORVASC) 5 MG tablet; Take 1 tablet by mouth 2 (Two) Times a Day for 90 days.  Dispense: 180 tablet; Refill: 1  -     Comprehensive Metabolic Panel  -     CBC & Differential  -     TSH  -     Lipid Panel    3. Mixed hyperlipidemia  -     pravastatin (PRAVACHOL) 80 MG tablet; Take 1 tablet by mouth Every Night for 90 days.  Dispense: 90 tablet; Refill: 1  -     Comprehensive Metabolic Panel  -     CBC & Differential  -     TSH  -     Lipid Panel    4. Type 2 diabetes mellitus without complication, with long-term current use of insulin (CMS/[facility])  -     metFORMIN (GLUCOPHAGE) 500 MG tablet; Take 1 tablet by mouth 2 (Two) Times a Day With Meals for 90 days.  Dispense: 180 tablet; Refill: 1  -     insulin glargine (Lantus) 100 UNIT/ML injection; Inject 30 Units under the skin into the appropriate area as directed Every Night for 90 days.  Dispense: 9 mL; Refill: 1  -     TSH  -     Hemoglobin A1c    5. Vitamin D deficiency  -     tamsulosin (FLOMAX) 0.4 MG capsule 24 hr capsule; Take 1 capsule by mouth Daily for 90 days.  Dispense: 90 capsule; Refill: 1  -     Vitamin D 25 Hydroxy    6. Benign prostatic hyperplasia without lower urinary tract symptoms  -     finasteride (PROSCAR) 5 MG tablet; Take 1 tablet by mouth Daily for 90 days.  Dispense: 90 tablet; Refill: 1    7. Stage 3a chronic kidney disease (CMS/HCC)    8. Insomnia, unspecified type    9. Other iron deficiency anemia  -     FeroSul 325 (65 Fe) MG tablet; Take 1 tablet by mouth 2 (Two) Times a Day.  Dispense: 180 tablet; Refill: 1  -      folic acid (FOLVITE) 400 MCG tablet; Take 1 tablet by mouth Daily for 90 days.  Dispense: 90 tablet; Refill: 1  -     Iron Profile  -     Vitamin B12 & Folate    10. Acquired hypothyroidism  -     levothyroxine (Synthroid) 25 MCG tablet; Take 1 tablet by mouth Daily for 90 days.  Dispense: 90 tablet; Refill: 1      Follow Up:  Return in about 6 months (around 12/8/2021) for f.u 6 mth and we will fasting labs next visit.     An After Visit Summary and PPPS were given to the patient.    I reviewed and refilled meds.  Pt is getting labs today.  We will call results.  Call with any concerns or questions.    I have reviewed all medications and at this time no medications changes need to be adjusted for all chronic conditions.    Lanie Murillo, APRN

## 2021-07-06 ENCOUNTER — TRANSCRIBE ORDERS (OUTPATIENT)
Dept: LAB | Facility: HOSPITAL | Age: 81
End: 2021-07-06

## 2021-07-06 ENCOUNTER — LAB (OUTPATIENT)
Dept: LAB | Facility: HOSPITAL | Age: 81
End: 2021-07-06

## 2021-07-06 DIAGNOSIS — N28.9 RENAL INSUFFICIENCY: Primary | ICD-10-CM

## 2021-07-06 DIAGNOSIS — N28.9 RENAL INSUFFICIENCY: ICD-10-CM

## 2021-07-06 LAB
ANION GAP SERPL CALCULATED.3IONS-SCNC: 13.1 MMOL/L (ref 5–15)
BASOPHILS # BLD AUTO: 0.07 10*3/MM3 (ref 0–0.2)
BASOPHILS NFR BLD AUTO: 0.9 % (ref 0–1.5)
BILIRUB UR QL STRIP: NEGATIVE
BUN SERPL-MCNC: 27 MG/DL (ref 8–23)
BUN/CREAT SERPL: 13.6 (ref 7–25)
CALCIUM SPEC-SCNC: 9 MG/DL (ref 8.6–10.5)
CHLORIDE SERPL-SCNC: 108 MMOL/L (ref 98–107)
CLARITY UR: CLEAR
CO2 SERPL-SCNC: 19.9 MMOL/L (ref 22–29)
COLOR UR: YELLOW
CREAT SERPL-MCNC: 1.98 MG/DL (ref 0.76–1.27)
CREAT UR-MCNC: 217.2 MG/DL
DEPRECATED RDW RBC AUTO: 45.3 FL (ref 37–54)
EOSINOPHIL # BLD AUTO: 0.08 10*3/MM3 (ref 0–0.4)
EOSINOPHIL NFR BLD AUTO: 1 % (ref 0.3–6.2)
ERYTHROCYTE [DISTWIDTH] IN BLOOD BY AUTOMATED COUNT: 14.1 % (ref 12.3–15.4)
GFR SERPL CREATININE-BSD FRML MDRD: 33 ML/MIN/1.73
GFR SERPL CREATININE-BSD FRML MDRD: 39 ML/MIN/1.73
GLUCOSE SERPL-MCNC: 112 MG/DL (ref 65–99)
GLUCOSE UR STRIP-MCNC: NEGATIVE MG/DL
HCT VFR BLD AUTO: 41.2 % (ref 37.5–51)
HGB BLD-MCNC: 13.2 G/DL (ref 13–17.7)
HGB UR QL STRIP.AUTO: NEGATIVE
IMM GRANULOCYTES # BLD AUTO: 0.03 10*3/MM3 (ref 0–0.05)
IMM GRANULOCYTES NFR BLD AUTO: 0.4 % (ref 0–0.5)
KETONES UR QL STRIP: ABNORMAL
LEUKOCYTE ESTERASE UR QL STRIP.AUTO: NEGATIVE
LYMPHOCYTES # BLD AUTO: 2.73 10*3/MM3 (ref 0.7–3.1)
LYMPHOCYTES NFR BLD AUTO: 35.2 % (ref 19.6–45.3)
MCH RBC QN AUTO: 27.8 PG (ref 26.6–33)
MCHC RBC AUTO-ENTMCNC: 32 G/DL (ref 31.5–35.7)
MCV RBC AUTO: 86.9 FL (ref 79–97)
MONOCYTES # BLD AUTO: 0.71 10*3/MM3 (ref 0.1–0.9)
MONOCYTES NFR BLD AUTO: 9.1 % (ref 5–12)
NEUTROPHILS NFR BLD AUTO: 4.14 10*3/MM3 (ref 1.7–7)
NEUTROPHILS NFR BLD AUTO: 53.4 % (ref 42.7–76)
NITRITE UR QL STRIP: NEGATIVE
NRBC BLD AUTO-RTO: 0.1 /100 WBC (ref 0–0.2)
PH UR STRIP.AUTO: 5.5 [PH] (ref 5–8)
PLATELET # BLD AUTO: 207 10*3/MM3 (ref 140–450)
PMV BLD AUTO: 11.8 FL (ref 6–12)
POTASSIUM SERPL-SCNC: 4.1 MMOL/L (ref 3.5–5.2)
PROT UR QL STRIP: ABNORMAL
PROT UR-MCNC: 22 MG/DL
RBC # BLD AUTO: 4.74 10*6/MM3 (ref 4.14–5.8)
SODIUM SERPL-SCNC: 141 MMOL/L (ref 136–145)
SP GR UR STRIP: 1.02 (ref 1–1.03)
UROBILINOGEN UR QL STRIP: ABNORMAL
WBC # BLD AUTO: 7.76 10*3/MM3 (ref 3.4–10.8)

## 2021-07-06 PROCEDURE — 85025 COMPLETE CBC W/AUTO DIFF WBC: CPT

## 2021-07-06 PROCEDURE — 36415 COLL VENOUS BLD VENIPUNCTURE: CPT

## 2021-07-06 PROCEDURE — 80048 BASIC METABOLIC PNL TOTAL CA: CPT

## 2021-07-06 PROCEDURE — 84156 ASSAY OF PROTEIN URINE: CPT

## 2021-07-06 PROCEDURE — 81003 URINALYSIS AUTO W/O SCOPE: CPT

## 2021-07-06 PROCEDURE — 82570 ASSAY OF URINE CREATININE: CPT

## 2021-07-15 VITALS
DIASTOLIC BLOOD PRESSURE: 64 MMHG | HEART RATE: 65 BPM | WEIGHT: 196.37 LBS | SYSTOLIC BLOOD PRESSURE: 140 MMHG | TEMPERATURE: 96.9 F | BODY MASS INDEX: 28.11 KG/M2 | HEIGHT: 70 IN

## 2021-09-08 DIAGNOSIS — Z79.4 TYPE 2 DIABETES MELLITUS WITHOUT COMPLICATION, WITH LONG-TERM CURRENT USE OF INSULIN (HCC): ICD-10-CM

## 2021-09-08 DIAGNOSIS — E11.9 TYPE 2 DIABETES MELLITUS WITHOUT COMPLICATION, WITH LONG-TERM CURRENT USE OF INSULIN (HCC): ICD-10-CM

## 2021-09-08 RX ORDER — INSULIN GLARGINE 100 [IU]/ML
30 INJECTION, SOLUTION SUBCUTANEOUS NIGHTLY
Qty: 9 ML | Refills: 1 | Status: SHIPPED | OUTPATIENT
Start: 2021-09-08 | End: 2021-12-02 | Stop reason: SDUPTHER

## 2021-09-08 RX ORDER — PEN NEEDLE, DIABETIC 29 G X1/2"
30 NEEDLE, DISPOSABLE MISCELLANEOUS DAILY
Qty: 1 EACH | Refills: 2 | Status: SHIPPED | OUTPATIENT
Start: 2021-09-08 | End: 2021-12-02 | Stop reason: SDUPTHER

## 2021-09-08 NOTE — TELEPHONE ENCOUNTER
"  Caller: Too Velasquez    Relationship: Self    Best call back number: [phone]    Medication needed:   Requested Prescriptions     Pending Prescriptions Disp Refills   • BD Insulin Syringe U/F 31G X 5/16\" 0.5 ML misc         When do you need the refill by: ASAP    What additional details did the patient provide when requesting the medication:     Does the patient have less than a 3 day supply:  [x] Yes  [] No    What is the patient's preferred pharmacy:    [facility]  [address] [place] [id number] [phone]          "

## 2021-09-08 NOTE — TELEPHONE ENCOUNTER
Caller: DARRON LOREDOIN    Relationship: Emergency Contact    Best call back number: [phone]     Medication needed:   Requested Prescriptions     Pending Prescriptions Disp Refills   • insulin glargine (Lantus) 100 UNIT/ML injection 9 mL 1     Sig: Inject 30 Units under the skin into the appropriate area as directed Every Night for 90 days.       When do you need the refill by: 09/08/21    What additional details did the patient provide when requesting the medication:    Does the patient have less than a 3 day supply:  [x] Yes  [] No    What is the patient's preferred pharmacy: [facility] [place] - [address] [facility] - [phone] [facility] [phone]

## 2021-12-02 ENCOUNTER — OFFICE VISIT (OUTPATIENT)
Dept: FAMILY MEDICINE CLINIC | Facility: CLINIC | Age: 81
End: 2021-12-02

## 2021-12-02 VITALS
DIASTOLIC BLOOD PRESSURE: 70 MMHG | SYSTOLIC BLOOD PRESSURE: 130 MMHG | OXYGEN SATURATION: 98 % | RESPIRATION RATE: 18 BRPM | HEIGHT: 70 IN | BODY MASS INDEX: 29.58 KG/M2 | TEMPERATURE: 97.2 F | HEART RATE: 67 BPM | WEIGHT: 206.6 LBS

## 2021-12-02 DIAGNOSIS — D50.8 OTHER IRON DEFICIENCY ANEMIA: ICD-10-CM

## 2021-12-02 DIAGNOSIS — Z79.4 TYPE 2 DIABETES MELLITUS WITHOUT COMPLICATION, WITH LONG-TERM CURRENT USE OF INSULIN (HCC): ICD-10-CM

## 2021-12-02 DIAGNOSIS — Z23 NEED FOR INFLUENZA VACCINATION: ICD-10-CM

## 2021-12-02 DIAGNOSIS — N18.32 STAGE 3B CHRONIC KIDNEY DISEASE (HCC): ICD-10-CM

## 2021-12-02 DIAGNOSIS — N40.0 BENIGN PROSTATIC HYPERPLASIA WITHOUT LOWER URINARY TRACT SYMPTOMS: ICD-10-CM

## 2021-12-02 DIAGNOSIS — E03.9 ACQUIRED HYPOTHYROIDISM: ICD-10-CM

## 2021-12-02 DIAGNOSIS — E78.2 MIXED HYPERLIPIDEMIA: ICD-10-CM

## 2021-12-02 DIAGNOSIS — E11.9 TYPE 2 DIABETES MELLITUS WITHOUT COMPLICATION, WITH LONG-TERM CURRENT USE OF INSULIN (HCC): ICD-10-CM

## 2021-12-02 DIAGNOSIS — I10 ESSENTIAL HYPERTENSION: Primary | ICD-10-CM

## 2021-12-02 DIAGNOSIS — Z12.5 SCREENING PSA (PROSTATE SPECIFIC ANTIGEN): ICD-10-CM

## 2021-12-02 PROBLEM — D64.9 ABSOLUTE ANEMIA: Status: ACTIVE | Noted: 2021-12-02

## 2021-12-02 LAB
ALBUMIN SERPL-MCNC: 4.3 G/DL (ref 3.5–5.2)
ALBUMIN UR-MCNC: 3.2 MG/DL
ALBUMIN/GLOB SERPL: 1.4 G/DL
ALP SERPL-CCNC: 79 U/L (ref 39–117)
ALT SERPL W P-5'-P-CCNC: 11 U/L (ref 1–41)
ANION GAP SERPL CALCULATED.3IONS-SCNC: 14.5 MMOL/L (ref 5–15)
AST SERPL-CCNC: 13 U/L (ref 1–40)
BASOPHILS # BLD AUTO: 0.05 10*3/MM3 (ref 0–0.2)
BASOPHILS NFR BLD AUTO: 0.8 % (ref 0–1.5)
BILIRUB SERPL-MCNC: 0.2 MG/DL (ref 0–1.2)
BUN SERPL-MCNC: 22 MG/DL (ref 8–23)
BUN/CREAT SERPL: 12.4 (ref 7–25)
CALCIUM SPEC-SCNC: 8.9 MG/DL (ref 8.6–10.5)
CHLORIDE SERPL-SCNC: 108 MMOL/L (ref 98–107)
CHOLEST SERPL-MCNC: 131 MG/DL (ref 0–200)
CO2 SERPL-SCNC: 17.5 MMOL/L (ref 22–29)
CREAT SERPL-MCNC: 1.77 MG/DL (ref 0.76–1.27)
DEPRECATED RDW RBC AUTO: 40.1 FL (ref 37–54)
EOSINOPHIL # BLD AUTO: 0.07 10*3/MM3 (ref 0–0.4)
EOSINOPHIL NFR BLD AUTO: 1.1 % (ref 0.3–6.2)
ERYTHROCYTE [DISTWIDTH] IN BLOOD BY AUTOMATED COUNT: 13.3 % (ref 12.3–15.4)
FERRITIN SERPL-MCNC: 306 NG/ML (ref 30–400)
GFR SERPL CREATININE-BSD FRML MDRD: 37 ML/MIN/1.73
GFR SERPL CREATININE-BSD FRML MDRD: 45 ML/MIN/1.73
GLOBULIN UR ELPH-MCNC: 3 GM/DL
GLUCOSE SERPL-MCNC: 103 MG/DL (ref 65–99)
HBA1C MFR BLD: 7.06 % (ref 4.8–5.6)
HCT VFR BLD AUTO: 36.1 % (ref 37.5–51)
HDLC SERPL-MCNC: 45 MG/DL (ref 40–60)
HGB BLD-MCNC: 11.8 G/DL (ref 13–17.7)
IMM GRANULOCYTES # BLD AUTO: 0.01 10*3/MM3 (ref 0–0.05)
IMM GRANULOCYTES NFR BLD AUTO: 0.2 % (ref 0–0.5)
IRON 24H UR-MRATE: 53 MCG/DL (ref 59–158)
IRON SATN MFR SERPL: 18 % (ref 20–50)
LDLC SERPL CALC-MCNC: 66 MG/DL (ref 0–100)
LDLC/HDLC SERPL: 1.44 {RATIO}
LYMPHOCYTES # BLD AUTO: 1.98 10*3/MM3 (ref 0.7–3.1)
LYMPHOCYTES NFR BLD AUTO: 32 % (ref 19.6–45.3)
MCH RBC QN AUTO: 27.6 PG (ref 26.6–33)
MCHC RBC AUTO-ENTMCNC: 32.7 G/DL (ref 31.5–35.7)
MCV RBC AUTO: 84.3 FL (ref 79–97)
MONOCYTES # BLD AUTO: 0.51 10*3/MM3 (ref 0.1–0.9)
MONOCYTES NFR BLD AUTO: 8.3 % (ref 5–12)
NEUTROPHILS NFR BLD AUTO: 3.56 10*3/MM3 (ref 1.7–7)
NEUTROPHILS NFR BLD AUTO: 57.6 % (ref 42.7–76)
NRBC BLD AUTO-RTO: 0 /100 WBC (ref 0–0.2)
PLATELET # BLD AUTO: 218 10*3/MM3 (ref 140–450)
PMV BLD AUTO: 11.7 FL (ref 6–12)
POTASSIUM SERPL-SCNC: 4 MMOL/L (ref 3.5–5.2)
PROT SERPL-MCNC: 7.3 G/DL (ref 6–8.5)
RBC # BLD AUTO: 4.28 10*6/MM3 (ref 4.14–5.8)
SODIUM SERPL-SCNC: 140 MMOL/L (ref 136–145)
T4 FREE SERPL-MCNC: 1.36 NG/DL (ref 0.93–1.7)
TIBC SERPL-MCNC: 289 MCG/DL (ref 298–536)
TRANSFERRIN SERPL-MCNC: 194 MG/DL (ref 200–360)
TRIGL SERPL-MCNC: 106 MG/DL (ref 0–150)
TSH SERPL DL<=0.05 MIU/L-ACNC: 3.72 UIU/ML (ref 0.27–4.2)
VLDLC SERPL-MCNC: 20 MG/DL (ref 5–40)
WBC NRBC COR # BLD: 6.18 10*3/MM3 (ref 3.4–10.8)

## 2021-12-02 PROCEDURE — 80061 LIPID PANEL: CPT | Performed by: NURSE PRACTITIONER

## 2021-12-02 PROCEDURE — 84466 ASSAY OF TRANSFERRIN: CPT | Performed by: NURSE PRACTITIONER

## 2021-12-02 PROCEDURE — G0008 ADMIN INFLUENZA VIRUS VAC: HCPCS | Performed by: NURSE PRACTITIONER

## 2021-12-02 PROCEDURE — 83036 HEMOGLOBIN GLYCOSYLATED A1C: CPT | Performed by: NURSE PRACTITIONER

## 2021-12-02 PROCEDURE — 84443 ASSAY THYROID STIM HORMONE: CPT | Performed by: NURSE PRACTITIONER

## 2021-12-02 PROCEDURE — 83540 ASSAY OF IRON: CPT | Performed by: NURSE PRACTITIONER

## 2021-12-02 PROCEDURE — 82043 UR ALBUMIN QUANTITATIVE: CPT | Performed by: NURSE PRACTITIONER

## 2021-12-02 PROCEDURE — 82728 ASSAY OF FERRITIN: CPT | Performed by: NURSE PRACTITIONER

## 2021-12-02 PROCEDURE — 80053 COMPREHEN METABOLIC PANEL: CPT | Performed by: NURSE PRACTITIONER

## 2021-12-02 PROCEDURE — 36415 COLL VENOUS BLD VENIPUNCTURE: CPT | Performed by: NURSE PRACTITIONER

## 2021-12-02 PROCEDURE — 99214 OFFICE O/P EST MOD 30 MIN: CPT | Performed by: NURSE PRACTITIONER

## 2021-12-02 PROCEDURE — 84439 ASSAY OF FREE THYROXINE: CPT | Performed by: NURSE PRACTITIONER

## 2021-12-02 PROCEDURE — 90662 IIV NO PRSV INCREASED AG IM: CPT | Performed by: NURSE PRACTITIONER

## 2021-12-02 PROCEDURE — 85025 COMPLETE CBC W/AUTO DIFF WBC: CPT | Performed by: NURSE PRACTITIONER

## 2021-12-02 RX ORDER — FINASTERIDE 5 MG/1
5 TABLET, FILM COATED ORAL DAILY
Qty: 90 TABLET | Refills: 1 | Status: SHIPPED | OUTPATIENT
Start: 2021-12-02 | End: 2022-05-26 | Stop reason: SDUPTHER

## 2021-12-02 RX ORDER — LANCETS 28 GAUGE
1 EACH MISCELLANEOUS DAILY
Qty: 100 EACH | Refills: 12 | Status: SHIPPED | OUTPATIENT
Start: 2021-12-02 | End: 2022-05-26 | Stop reason: SDUPTHER

## 2021-12-02 RX ORDER — SKIN PROTECTANT 44 G/100G
1 OINTMENT TOPICAL DAILY
Qty: 454 G | Refills: 2 | Status: SHIPPED | OUTPATIENT
Start: 2021-12-02 | End: 2022-11-22 | Stop reason: SDUPTHER

## 2021-12-02 RX ORDER — LANOLIN ALCOHOL/MO/W.PET/CERES
0.4 CREAM (GRAM) TOPICAL DAILY
COMMUNITY
End: 2021-12-02 | Stop reason: SDUPTHER

## 2021-12-02 RX ORDER — LANCETS 28 GAUGE
1 EACH MISCELLANEOUS AS NEEDED
COMMUNITY
End: 2021-12-02 | Stop reason: SDUPTHER

## 2021-12-02 RX ORDER — LEVOTHYROXINE SODIUM 0.03 MG/1
25 TABLET ORAL DAILY
Qty: 90 TABLET | Refills: 1 | Status: SHIPPED | OUTPATIENT
Start: 2021-12-02 | End: 2022-05-26 | Stop reason: SDUPTHER

## 2021-12-02 RX ORDER — TAMSULOSIN HYDROCHLORIDE 0.4 MG/1
1 CAPSULE ORAL DAILY
COMMUNITY
End: 2021-12-02 | Stop reason: SDUPTHER

## 2021-12-02 RX ORDER — LANOLIN ALCOHOL/MO/W.PET/CERES
0.4 CREAM (GRAM) TOPICAL DAILY
Qty: 90 TABLET | Refills: 1 | Status: SHIPPED | OUTPATIENT
Start: 2021-12-02 | End: 2022-06-07 | Stop reason: SDUPTHER

## 2021-12-02 RX ORDER — PRAVASTATIN SODIUM 80 MG/1
80 TABLET ORAL NIGHTLY
Qty: 90 TABLET | Refills: 1 | Status: SHIPPED | OUTPATIENT
Start: 2021-12-02 | End: 2022-05-26 | Stop reason: SDUPTHER

## 2021-12-02 RX ORDER — FERROUS SULFATE 325(65) MG
1 TABLET ORAL 2 TIMES DAILY
Qty: 180 TABLET | Refills: 1 | Status: SHIPPED | OUTPATIENT
Start: 2021-12-02 | End: 2022-06-07 | Stop reason: SDUPTHER

## 2021-12-02 RX ORDER — AMLODIPINE BESYLATE 5 MG/1
5 TABLET ORAL DAILY
Qty: 90 TABLET | Refills: 1 | Status: SHIPPED | OUTPATIENT
Start: 2021-12-02 | End: 2022-05-26 | Stop reason: SDUPTHER

## 2021-12-02 RX ORDER — AMLODIPINE BESYLATE 5 MG/1
5 TABLET ORAL DAILY
COMMUNITY
End: 2021-12-02 | Stop reason: SDUPTHER

## 2021-12-02 RX ORDER — DILTIAZEM HYDROCHLORIDE 240 MG/1
240 CAPSULE, COATED, EXTENDED RELEASE ORAL DAILY
COMMUNITY

## 2021-12-02 RX ORDER — INSULIN GLARGINE 100 [IU]/ML
30 INJECTION, SOLUTION SUBCUTANEOUS NIGHTLY
Qty: 9 ML | Refills: 1 | Status: SHIPPED | OUTPATIENT
Start: 2021-12-02 | End: 2022-05-26 | Stop reason: SDUPTHER

## 2021-12-02 RX ORDER — LISINOPRIL 20 MG/1
20 TABLET ORAL DAILY
Qty: 90 TABLET | Refills: 1 | Status: SHIPPED | OUTPATIENT
Start: 2021-12-02 | End: 2022-05-26 | Stop reason: SDUPTHER

## 2021-12-02 RX ORDER — SKIN PROTECTANT 44 G/100G
OINTMENT TOPICAL
COMMUNITY
End: 2021-12-02 | Stop reason: SDUPTHER

## 2021-12-02 RX ORDER — TAMSULOSIN HYDROCHLORIDE 0.4 MG/1
1 CAPSULE ORAL DAILY
Qty: 90 CAPSULE | Refills: 1 | Status: SHIPPED | OUTPATIENT
Start: 2021-12-02 | End: 2022-05-26 | Stop reason: SDUPTHER

## 2021-12-02 RX ORDER — PEN NEEDLE, DIABETIC 29 G X1/2"
30 NEEDLE, DISPOSABLE MISCELLANEOUS DAILY
Qty: 1 EACH | Refills: 2 | Status: SHIPPED | OUTPATIENT
Start: 2021-12-02 | End: 2022-11-22 | Stop reason: SDUPTHER

## 2021-12-02 RX ORDER — FINASTERIDE 5 MG/1
5 TABLET, FILM COATED ORAL DAILY
COMMUNITY
End: 2021-12-02 | Stop reason: SDUPTHER

## 2021-12-02 NOTE — PROGRESS NOTES
Chief Complaint  Hyperlipidemia, Hypertension, and Diabetes    Subjective          Too Velasquez presents to [facility]  History of Present Illness  HTN; he is taking his Norvasc/lisinopril daily.  No chest pain shortness of breath noted.  LIPID: He is taking his pravastatin with no leg pain.  DM: He has been checking his sugars daily though his wife is tired of checking in for him and request that he not have to check them every single day.  He is on insulin currently.  He is not having anything higher than 110 in the mornings.  BPH: He is taking his Flomax without any issues regarding his prostate.  ANEMIA: He is taking his folic acid and his iron daily no blood in the stool that he can see.  THYROID: He is taking his Synthroid daily at 25 mcg he cannot tell any difference.  CKD: Kidneys are stable by last set of labs.  He is seeing the kidney specialist twice and cannot remember if he is got another appointment.  He is having diarrhea since scope in Feb.  Has not tried anything.  He has not been back to the specialist for the diarrhea.  He said that it started right after the scope.  He has not tried anything over-the-counter.  He has not changed his diet.    Past Medical History:   • BPH (benign prostatic hyperplasia)   • Diabetes (HCC)   • Essential hypertension   • Glaucoma   • Headache   • High cholesterol   • Hyperlipidemia   • Insomnia   • Irregular heart rate   • Sinusitis   • SOB (shortness of breath)       Allergies  Penicillins    Past Surgical History:   • CATARACT EXTRACTION   • TUMOR REMOVAL    wind pipe left breast       Social History     Tobacco Use   • Smoking status: Never Smoker   • Smokeless tobacco: Never Used   Substance Use Topics   • Alcohol use: Never   • Drug use: Not on file       Family History   Problem Relation Age of Onset   • Heart disease Mother    • Diabetes Mother         Unspecified   • Heart disease Father    • Diabetes Sister          "Unspecified        Health Maintenance Due   Topic Date Due   • TDAP/TD VACCINES (1 - Tdap) Never done   • ZOSTER VACCINE (1 of 2) Never done   • Pneumococcal Vaccine 65+ (1 of 2 - PPSV23) 12/15/2018   • COVID-19 Vaccine (3 - Booster for Moderna series) 08/26/2021          Current Outpatient Medications:   •  Alphagan P 0.1 % solution ophthalmic solution, , Disp: , Rfl:   •  amLODIPine (NORVASC) 5 MG tablet, Take 1 tablet by mouth Daily., Disp: 90 tablet, Rfl: 1  •  apixaban (ELIQUIS) 2.5 MG tablet tablet, Take 2.5 mg by mouth 2 (Two) Times a Day., Disp: , Rfl:   •  dilTIAZem CD (CARDIZEM CD) 240 MG 24 hr capsule, Take 240 mg by mouth Daily., Disp: , Rfl:   •  dorzolamide-timolol (COSOPT) 22.3-6.8 MG/ML ophthalmic solution, , Disp: , Rfl:   •  Emollient (DermaPhor) ointment, Apply 1 application topically Daily., Disp: 454 g, Rfl: 2  •  finasteride (PROSCAR) 5 MG tablet, Take 1 tablet by mouth Daily., Disp: 90 tablet, Rfl: 1  •  folic acid (FOLVITE) 400 MCG tablet, Take 1 tablet by mouth Daily., Disp: 90 tablet, Rfl: 1  •  glucose blood test strip, 1 each by Other route Daily. Use as instructed, Disp: 100 each, Rfl: 12  •  Lancets (freestyle) lancets, 1 each by Other route Daily. Use as instructed, Disp: 100 each, Rfl: 12  •  tamsulosin (FLOMAX) 0.4 MG capsule 24 hr capsule, Take 1 capsule by mouth Daily., Disp: 90 capsule, Rfl: 1  •  BD Insulin Syringe U/F 31G X 5/16\" 0.5 ML misc, Inject 30 Units as directed Daily., Disp: 1 each, Rfl: 2  •  FeroSul 325 (65 Fe) MG tablet, Take 1 tablet by mouth 2 (Two) Times a Day., Disp: 180 tablet, Rfl: 1  •  insulin glargine (Lantus) 100 UNIT/ML injection, Inject 30 Units under the skin into the appropriate area as directed Every Night for 60 days., Disp: 9 mL, Rfl: 1  •  levothyroxine (Synthroid) 25 MCG tablet, Take 1 tablet by mouth Daily., Disp: 90 tablet, Rfl: 1  •  lisinopril (PRINIVIL,ZESTRIL) 20 MG tablet, Take 1 tablet by mouth Daily., Disp: 90 tablet, Rfl: 1  •  metFORMIN " "(GLUCOPHAGE) 500 MG tablet, Take 1 tablet by mouth 2 (Two) Times a Day With Meals., Disp: 180 tablet, Rfl: 1  •  pravastatin (PRAVACHOL) 80 MG tablet, Take 1 tablet by mouth Every Night., Disp: 90 tablet, Rfl: 1    Medications Discontinued During This Encounter   Medication Reason   • levothyroxine (Synthroid) 25 MCG tablet Reorder   • FeroSul 325 (65 Fe) MG tablet Reorder   • pravastatin (PRAVACHOL) 80 MG tablet Reorder   • metFORMIN (GLUCOPHAGE) 500 MG tablet Reorder   • lisinopril (PRINIVIL,ZESTRIL) 20 MG tablet Reorder   • BD Insulin Syringe U/F 31G X 5/16\" 0.5 ML misc Reorder   • insulin glargine (Lantus) 100 UNIT/ML injection Reorder   • folic acid (FOLVITE) 400 MCG tablet Reorder   • amLODIPine (NORVASC) 5 MG tablet Reorder   • tamsulosin (FLOMAX) 0.4 MG capsule 24 hr capsule Reorder   • finasteride (PROSCAR) 5 MG tablet Reorder   • Lancets (freestyle) lancets Reorder   • glucose blood test strip Reorder   • Emollient (DermaPhor) ointment Reorder       Immunization History   Administered Date(s) Administered   • COVID-19 (MODERNA) 1st, 2nd, 3rd Dose Only 01/29/2021, 02/26/2021   • Fluzone High-Dose 65+yrs 12/02/2021   • Influenza, Unspecified 10/01/2020   • Pneumococcal Conjugate 13-Valent (PCV13) 10/20/2018       Review of Systems   Constitutional: Negative for fatigue.   Respiratory: Negative for cough and shortness of breath.    Cardiovascular: Negative for chest pain.   Gastrointestinal: Positive for diarrhea. Negative for nausea and vomiting.        Objective       Vitals:    12/02/21 0927   BP: 130/70   Pulse:    Resp:    Temp:    SpO2:      Body mass index is 29.64 kg/m².         Physical Exam  Vitals reviewed.   Constitutional:       Appearance: Normal appearance. He is well-developed.   Cardiovascular:      Rate and Rhythm: Normal rate and regular rhythm.      Heart sounds: Normal heart sounds. No murmur heard.      Pulmonary:      Effort: Pulmonary effort is normal.      Breath sounds: Normal breath " sounds.   Musculoskeletal:      Right lower leg: No edema.      Left lower leg: No edema.   Neurological:      Mental Status: He is alert and oriented to person, place, and time.      Cranial Nerves: No cranial nerve deficit.      Motor: No weakness.   Psychiatric:         Mood and Affect: Mood and affect normal.             Result Review :     The following data was reviewed by: BRIAN Arizmendi on 12/02/2021:    Common labs    Common Labsle 3/17/21 3/17/21 6/8/21 6/8/21 6/8/21 6/8/21 7/6/21 7/6/21    1359 1359 1531 1531 1531 1531 1131 1131   Glucose  99   87   112 (A)   BUN  17   28 (A)   27 (A)   Creatinine  1.59 (A)   2.30 (A)   1.98 (A)   eGFR Non African Am     27 (A)   33 (A)   eGFR  Am     33 (A)   39 (A)   Sodium  146   141   141   Potassium  4.2   4.2   4.1   Chloride  110   109 (A)   108 (A)   Calcium  9.1   9.0   9.0   Albumin  3.9   4.50      Total Bilirubin  0.15 (A)   <0.2      Alkaline Phosphatase  86   78      AST (SGOT)  11 (A)   12      ALT (SGPT)  9 (A)   11      WBC  7.91 7.30    7.76    Hemoglobin  11.5 (A) 11.7 (A)    13.2    Hematocrit  36.6 (A) 36.5 (A)    41.2    Platelets  225 205    207    Total Cholesterol    154       Total Cholesterol  134         Triglycerides  117  114       HDL Cholesterol  47  46       LDL Cholesterol   64 (A)  87       Hemoglobin A1C 6.8 (A)     6.96 (A)     (A) Abnormal value       Comments are available for some flowsheets but are not being displayed.           Most Recent A1C    HGBA1C Most Recent 6/8/21   Hemoglobin A1C 6.96 (A)   (A) Abnormal value                           Assessment and Plan      Diagnoses and all orders for this visit:    1. Essential hypertension (Primary)  -     lisinopril (PRINIVIL,ZESTRIL) 20 MG tablet; Take 1 tablet by mouth Daily.  Dispense: 90 tablet; Refill: 1  -     amLODIPine (NORVASC) 5 MG tablet; Take 1 tablet by mouth Daily.  Dispense: 90 tablet; Refill: 1  -     CBC & Differential  -     Comprehensive  "Metabolic Panel  -     Lipid Panel    2. Type 2 diabetes mellitus without complication, with long-term current use of insulin (HCC)  -     metFORMIN (GLUCOPHAGE) 500 MG tablet; Take 1 tablet by mouth 2 (Two) Times a Day With Meals.  Dispense: 180 tablet; Refill: 1  -     insulin glargine (Lantus) 100 UNIT/ML injection; Inject 30 Units under the skin into the appropriate area as directed Every Night for 60 days.  Dispense: 9 mL; Refill: 1  -     BD Insulin Syringe U/F 31G X 5/16\" 0.5 ML misc; Inject 30 Units as directed Daily.  Dispense: 1 each; Refill: 2  -     Hemoglobin A1c  -     MicroAlbumin, Urine, Random - Urine, Clean Catch  -     glucose blood test strip; 1 each by Other route Daily. Use as instructed  Dispense: 100 each; Refill: 12  -     Lancets (freestyle) lancets; 1 each by Other route Daily. Use as instructed  Dispense: 100 each; Refill: 12    3. Mixed hyperlipidemia  -     pravastatin (PRAVACHOL) 80 MG tablet; Take 1 tablet by mouth Every Night.  Dispense: 90 tablet; Refill: 1  -     CBC & Differential  -     Comprehensive Metabolic Panel  -     Lipid Panel    4. Other iron deficiency anemia  -     folic acid (FOLVITE) 400 MCG tablet; Take 1 tablet by mouth Daily.  Dispense: 90 tablet; Refill: 1  -     FeroSul 325 (65 Fe) MG tablet; Take 1 tablet by mouth 2 (Two) Times a Day.  Dispense: 180 tablet; Refill: 1  -     Ferritin  -     Iron Profile    5. Acquired hypothyroidism  -     levothyroxine (Synthroid) 25 MCG tablet; Take 1 tablet by mouth Daily.  Dispense: 90 tablet; Refill: 1  -     TSH  -     Emollient (DermaPhor) ointment; Apply 1 application topically Daily.  Dispense: 454 g; Refill: 2  -     T4, Free    6. Screening PSA (prostate specific antigen)  -     PSA SCREENING    7. Benign prostatic hyperplasia without lower urinary tract symptoms  -     tamsulosin (FLOMAX) 0.4 MG capsule 24 hr capsule; Take 1 capsule by mouth Daily.  Dispense: 90 capsule; Refill: 1  -     finasteride (PROSCAR) 5 MG " tablet; Take 1 tablet by mouth Daily.  Dispense: 90 tablet; Refill: 1    8. Stage 3b chronic kidney disease (HCC)    9. Need for influenza vaccination  -     Fluzone High-Dose 65+yrs            Follow Up     Return in about 6 months (around 6/2/2022) for Medicare Wellness6/8/21.  Follow-up in 6 months for labs and appt. Call with any concerns or questions that you may have regarding your medications or history.    I have reviewed all medications and at this time no medications changes need to be adjusted for all chronic conditions.  He can check sugars every other day vs daily since the sugars are good.  Do not go more than 1 week without insulin.  He will trial the OTC imodium for the diarrhea and keep me posted.  It started after the scope in Feb.  Patient was given instructions and counseling regarding his condition or for health maintenance advice. Please see specific information pulled into the AVS if appropriate.   BRIAN Arizmendi

## 2021-12-20 ENCOUNTER — TRANSCRIBE ORDERS (OUTPATIENT)
Dept: LAB | Facility: HOSPITAL | Age: 81
End: 2021-12-20

## 2021-12-20 ENCOUNTER — LAB (OUTPATIENT)
Dept: LAB | Facility: HOSPITAL | Age: 81
End: 2021-12-20

## 2021-12-20 DIAGNOSIS — N18.32 STAGE 3B CHRONIC KIDNEY DISEASE (HCC): ICD-10-CM

## 2021-12-20 DIAGNOSIS — N18.32 STAGE 3B CHRONIC KIDNEY DISEASE (HCC): Primary | ICD-10-CM

## 2021-12-20 LAB
ANION GAP SERPL CALCULATED.3IONS-SCNC: 10.7 MMOL/L (ref 5–15)
BACTERIA UR QL AUTO: NORMAL /HPF
BASOPHILS # BLD AUTO: 0.06 10*3/MM3 (ref 0–0.2)
BASOPHILS NFR BLD AUTO: 0.8 % (ref 0–1.5)
BILIRUB UR QL STRIP: NEGATIVE
BUN SERPL-MCNC: 29 MG/DL (ref 8–23)
BUN/CREAT SERPL: 15.3 (ref 7–25)
CALCIUM SPEC-SCNC: 9.4 MG/DL (ref 8.6–10.5)
CHLORIDE SERPL-SCNC: 110 MMOL/L (ref 98–107)
CLARITY UR: CLEAR
CO2 SERPL-SCNC: 21.3 MMOL/L (ref 22–29)
COLOR UR: YELLOW
CREAT SERPL-MCNC: 1.9 MG/DL (ref 0.76–1.27)
CREAT UR-MCNC: 157.1 MG/DL
DEPRECATED RDW RBC AUTO: 41.5 FL (ref 37–54)
EOSINOPHIL # BLD AUTO: 0.11 10*3/MM3 (ref 0–0.4)
EOSINOPHIL NFR BLD AUTO: 1.5 % (ref 0.3–6.2)
ERYTHROCYTE [DISTWIDTH] IN BLOOD BY AUTOMATED COUNT: 13.3 % (ref 12.3–15.4)
GFR SERPL CREATININE-BSD FRML MDRD: 34 ML/MIN/1.73
GFR SERPL CREATININE-BSD FRML MDRD: 41 ML/MIN/1.73
GLUCOSE SERPL-MCNC: 116 MG/DL (ref 65–99)
GLUCOSE UR STRIP-MCNC: NEGATIVE MG/DL
HCT VFR BLD AUTO: 36.8 % (ref 37.5–51)
HGB BLD-MCNC: 11.8 G/DL (ref 13–17.7)
HGB UR QL STRIP.AUTO: NEGATIVE
HYALINE CASTS UR QL AUTO: NORMAL /LPF
IMM GRANULOCYTES # BLD AUTO: 0.02 10*3/MM3 (ref 0–0.05)
IMM GRANULOCYTES NFR BLD AUTO: 0.3 % (ref 0–0.5)
KETONES UR QL STRIP: NEGATIVE
LEUKOCYTE ESTERASE UR QL STRIP.AUTO: NEGATIVE
LYMPHOCYTES # BLD AUTO: 2.46 10*3/MM3 (ref 0.7–3.1)
LYMPHOCYTES NFR BLD AUTO: 34.6 % (ref 19.6–45.3)
MCH RBC QN AUTO: 27.3 PG (ref 26.6–33)
MCHC RBC AUTO-ENTMCNC: 32.1 G/DL (ref 31.5–35.7)
MCV RBC AUTO: 85.2 FL (ref 79–97)
MONOCYTES # BLD AUTO: 0.55 10*3/MM3 (ref 0.1–0.9)
MONOCYTES NFR BLD AUTO: 7.7 % (ref 5–12)
NEUTROPHILS NFR BLD AUTO: 3.91 10*3/MM3 (ref 1.7–7)
NEUTROPHILS NFR BLD AUTO: 55.1 % (ref 42.7–76)
NITRITE UR QL STRIP: NEGATIVE
NRBC BLD AUTO-RTO: 0 /100 WBC (ref 0–0.2)
PH UR STRIP.AUTO: 5.5 [PH] (ref 5–8)
PHOSPHATE SERPL-MCNC: 3.5 MG/DL (ref 2.5–4.5)
PLATELET # BLD AUTO: 190 10*3/MM3 (ref 140–450)
PMV BLD AUTO: 12.4 FL (ref 6–12)
POTASSIUM SERPL-SCNC: 4.1 MMOL/L (ref 3.5–5.2)
PROT ?TM UR-MCNC: 24.5 MG/DL
PROT UR QL STRIP: ABNORMAL
PROT/CREAT UR: 0.2 MG/G{CREAT}
PTH-INTACT SERPL-MCNC: 75.4 PG/ML (ref 15–65)
RBC # BLD AUTO: 4.32 10*6/MM3 (ref 4.14–5.8)
RBC # UR STRIP: NORMAL /HPF
REF LAB TEST METHOD: NORMAL
SODIUM SERPL-SCNC: 142 MMOL/L (ref 136–145)
SP GR UR STRIP: 1.02 (ref 1–1.03)
SQUAMOUS #/AREA URNS HPF: NORMAL /HPF
UROBILINOGEN UR QL STRIP: ABNORMAL
WBC # UR STRIP: NORMAL /HPF
WBC NRBC COR # BLD: 7.11 10*3/MM3 (ref 3.4–10.8)

## 2021-12-20 PROCEDURE — 85025 COMPLETE CBC W/AUTO DIFF WBC: CPT

## 2021-12-20 PROCEDURE — 36415 COLL VENOUS BLD VENIPUNCTURE: CPT

## 2021-12-20 PROCEDURE — 84100 ASSAY OF PHOSPHORUS: CPT

## 2021-12-20 PROCEDURE — 81001 URINALYSIS AUTO W/SCOPE: CPT

## 2021-12-20 PROCEDURE — 83970 ASSAY OF PARATHORMONE: CPT

## 2021-12-20 PROCEDURE — 84156 ASSAY OF PROTEIN URINE: CPT

## 2021-12-20 PROCEDURE — 80048 BASIC METABOLIC PNL TOTAL CA: CPT

## 2021-12-20 PROCEDURE — 82570 ASSAY OF URINE CREATININE: CPT

## 2022-05-26 ENCOUNTER — OFFICE VISIT (OUTPATIENT)
Dept: FAMILY MEDICINE CLINIC | Facility: CLINIC | Age: 82
End: 2022-05-26

## 2022-05-26 VITALS
HEIGHT: 69 IN | SYSTOLIC BLOOD PRESSURE: 138 MMHG | TEMPERATURE: 97.5 F | OXYGEN SATURATION: 97 % | DIASTOLIC BLOOD PRESSURE: 76 MMHG | RESPIRATION RATE: 16 BRPM | HEART RATE: 76 BPM | BODY MASS INDEX: 31.21 KG/M2 | WEIGHT: 210.7 LBS

## 2022-05-26 DIAGNOSIS — D50.8 OTHER IRON DEFICIENCY ANEMIA: ICD-10-CM

## 2022-05-26 DIAGNOSIS — I10 ESSENTIAL HYPERTENSION: ICD-10-CM

## 2022-05-26 DIAGNOSIS — Z79.4 TYPE 2 DIABETES MELLITUS WITHOUT COMPLICATION, WITH LONG-TERM CURRENT USE OF INSULIN: Primary | ICD-10-CM

## 2022-05-26 DIAGNOSIS — E78.2 MIXED HYPERLIPIDEMIA: ICD-10-CM

## 2022-05-26 DIAGNOSIS — E11.9 TYPE 2 DIABETES MELLITUS WITHOUT COMPLICATION, WITH LONG-TERM CURRENT USE OF INSULIN: Primary | ICD-10-CM

## 2022-05-26 DIAGNOSIS — E55.9 VITAMIN D DEFICIENCY: ICD-10-CM

## 2022-05-26 DIAGNOSIS — N18.30 STAGE 3 CHRONIC KIDNEY DISEASE, UNSPECIFIED WHETHER STAGE 3A OR 3B CKD: ICD-10-CM

## 2022-05-26 DIAGNOSIS — R19.7 DIARRHEA, UNSPECIFIED TYPE: ICD-10-CM

## 2022-05-26 DIAGNOSIS — Z12.5 SCREENING PSA (PROSTATE SPECIFIC ANTIGEN): ICD-10-CM

## 2022-05-26 DIAGNOSIS — N40.0 BENIGN PROSTATIC HYPERPLASIA WITHOUT LOWER URINARY TRACT SYMPTOMS: ICD-10-CM

## 2022-05-26 DIAGNOSIS — E03.9 ACQUIRED HYPOTHYROIDISM: ICD-10-CM

## 2022-05-26 LAB
BASOPHILS # BLD AUTO: 0.05 10*3/MM3 (ref 0–0.2)
BASOPHILS NFR BLD AUTO: 0.8 % (ref 0–1.5)
DEPRECATED RDW RBC AUTO: 42.6 FL (ref 37–54)
EOSINOPHIL # BLD AUTO: 0.12 10*3/MM3 (ref 0–0.4)
EOSINOPHIL NFR BLD AUTO: 1.9 % (ref 0.3–6.2)
ERYTHROCYTE [DISTWIDTH] IN BLOOD BY AUTOMATED COUNT: 13.2 % (ref 12.3–15.4)
HCT VFR BLD AUTO: 34.3 % (ref 37.5–51)
HGB BLD-MCNC: 10.9 G/DL (ref 13–17.7)
IMM GRANULOCYTES # BLD AUTO: 0.02 10*3/MM3 (ref 0–0.05)
IMM GRANULOCYTES NFR BLD AUTO: 0.3 % (ref 0–0.5)
LYMPHOCYTES # BLD AUTO: 1.93 10*3/MM3 (ref 0.7–3.1)
LYMPHOCYTES NFR BLD AUTO: 30.6 % (ref 19.6–45.3)
MCH RBC QN AUTO: 27.7 PG (ref 26.6–33)
MCHC RBC AUTO-ENTMCNC: 31.8 G/DL (ref 31.5–35.7)
MCV RBC AUTO: 87.3 FL (ref 79–97)
MONOCYTES # BLD AUTO: 0.6 10*3/MM3 (ref 0.1–0.9)
MONOCYTES NFR BLD AUTO: 9.5 % (ref 5–12)
NEUTROPHILS NFR BLD AUTO: 3.58 10*3/MM3 (ref 1.7–7)
NEUTROPHILS NFR BLD AUTO: 56.9 % (ref 42.7–76)
NRBC BLD AUTO-RTO: 0 /100 WBC (ref 0–0.2)
PLATELET # BLD AUTO: 192 10*3/MM3 (ref 140–450)
PMV BLD AUTO: 11.9 FL (ref 6–12)
RBC # BLD AUTO: 3.93 10*6/MM3 (ref 4.14–5.8)
WBC NRBC COR # BLD: 6.3 10*3/MM3 (ref 3.4–10.8)

## 2022-05-26 PROCEDURE — 84550 ASSAY OF BLOOD/URIC ACID: CPT | Performed by: NURSE PRACTITIONER

## 2022-05-26 PROCEDURE — 84439 ASSAY OF FREE THYROXINE: CPT | Performed by: NURSE PRACTITIONER

## 2022-05-26 PROCEDURE — G0103 PSA SCREENING: HCPCS | Performed by: NURSE PRACTITIONER

## 2022-05-26 PROCEDURE — 85025 COMPLETE CBC W/AUTO DIFF WBC: CPT | Performed by: NURSE PRACTITIONER

## 2022-05-26 PROCEDURE — 82043 UR ALBUMIN QUANTITATIVE: CPT | Performed by: NURSE PRACTITIONER

## 2022-05-26 PROCEDURE — 84466 ASSAY OF TRANSFERRIN: CPT | Performed by: NURSE PRACTITIONER

## 2022-05-26 PROCEDURE — 82306 VITAMIN D 25 HYDROXY: CPT | Performed by: NURSE PRACTITIONER

## 2022-05-26 PROCEDURE — 82728 ASSAY OF FERRITIN: CPT | Performed by: NURSE PRACTITIONER

## 2022-05-26 PROCEDURE — 83540 ASSAY OF IRON: CPT | Performed by: NURSE PRACTITIONER

## 2022-05-26 PROCEDURE — 36415 COLL VENOUS BLD VENIPUNCTURE: CPT | Performed by: NURSE PRACTITIONER

## 2022-05-26 PROCEDURE — 84443 ASSAY THYROID STIM HORMONE: CPT | Performed by: NURSE PRACTITIONER

## 2022-05-26 PROCEDURE — 99214 OFFICE O/P EST MOD 30 MIN: CPT | Performed by: NURSE PRACTITIONER

## 2022-05-26 PROCEDURE — 80061 LIPID PANEL: CPT | Performed by: NURSE PRACTITIONER

## 2022-05-26 PROCEDURE — 80053 COMPREHEN METABOLIC PANEL: CPT | Performed by: NURSE PRACTITIONER

## 2022-05-26 PROCEDURE — 83036 HEMOGLOBIN GLYCOSYLATED A1C: CPT | Performed by: NURSE PRACTITIONER

## 2022-05-26 RX ORDER — MELATONIN
1000 DAILY
COMMUNITY

## 2022-05-26 RX ORDER — LEVOTHYROXINE SODIUM 0.03 MG/1
25 TABLET ORAL DAILY
Qty: 90 TABLET | Refills: 1 | Status: SHIPPED | OUTPATIENT
Start: 2022-05-26 | End: 2022-11-22 | Stop reason: SDUPTHER

## 2022-05-26 RX ORDER — PRAVASTATIN SODIUM 80 MG/1
80 TABLET ORAL NIGHTLY
Qty: 90 TABLET | Refills: 1 | Status: SHIPPED | OUTPATIENT
Start: 2022-05-26 | End: 2022-11-22 | Stop reason: SDUPTHER

## 2022-05-26 RX ORDER — AMLODIPINE BESYLATE 5 MG/1
5 TABLET ORAL DAILY
Qty: 90 TABLET | Refills: 1 | Status: SHIPPED | OUTPATIENT
Start: 2022-05-26 | End: 2022-11-22 | Stop reason: SDUPTHER

## 2022-05-26 RX ORDER — LISINOPRIL 20 MG/1
20 TABLET ORAL DAILY
Qty: 90 TABLET | Refills: 1 | Status: SHIPPED | OUTPATIENT
Start: 2022-05-26 | End: 2022-11-22 | Stop reason: SDUPTHER

## 2022-05-26 RX ORDER — INSULIN GLARGINE 100 [IU]/ML
30 INJECTION, SOLUTION SUBCUTANEOUS NIGHTLY
Qty: 9 ML | Refills: 1 | Status: SHIPPED | OUTPATIENT
Start: 2022-05-26 | End: 2022-06-08 | Stop reason: SDUPTHER

## 2022-05-26 RX ORDER — FINASTERIDE 5 MG/1
5 TABLET, FILM COATED ORAL DAILY
Qty: 90 TABLET | Refills: 1 | Status: SHIPPED | OUTPATIENT
Start: 2022-05-26 | End: 2022-11-22 | Stop reason: SDUPTHER

## 2022-05-26 RX ORDER — TAMSULOSIN HYDROCHLORIDE 0.4 MG/1
1 CAPSULE ORAL DAILY
Qty: 90 CAPSULE | Refills: 1 | Status: SHIPPED | OUTPATIENT
Start: 2022-05-26 | End: 2022-11-22 | Stop reason: SDUPTHER

## 2022-05-26 RX ORDER — LANCETS 28 GAUGE
1 EACH MISCELLANEOUS DAILY
Qty: 100 EACH | Refills: 12 | Status: SHIPPED | OUTPATIENT
Start: 2022-05-26

## 2022-05-26 NOTE — PROGRESS NOTES
Chief Complaint  Diabetes, Hyperlipidemia, and Hypertension (Pt is here for 6 month follow up, pt also needs lab work)    Subjective          Too Velasquez presents to [facility]  History of Present Illness   He is here with his wife today  HTN; he is taking his Norvasc/lisinopril daily.  No chest pain shortness of breath noted.  LIPID: He is taking his pravastatin with no leg pain.  DM: He has been checking his sugars daily.  He is on insulin currently.  He is not having anything higher than 110 in the mornings.  Today sugar was 64 but he feels fine.  He does have diarrhea once in the morning and then no other bowel movements after that.  He is not adjusted metformin at all.  The over-the-counter Imodium and probiotic did not help.  He does not have continuous bowel movements just 1 loose stool in the morning.  BPH: He is taking his Flomax without any issues regarding his prostate.  ANEMIA: He is taking his folic acid and his iron daily no blood in the stool that he can see.  THYROID: He is taking his Synthroid daily at 25 mcg he cannot tell any difference.  CKD: Kidneys are stable by last set of labs.  He is seeing the kidney specialist twice his next appointment with the kidney specialist is this upcoming June 21.  He feels fine overall.    Allergies  Penicillins    Social History     Tobacco Use   • Smoking status: Never Smoker   • Smokeless tobacco: Never Used   Substance Use Topics   • Alcohol use: Never       Family History   Problem Relation Age of Onset   • Heart disease Mother    • Diabetes Mother         Unspecified   • Heart disease Father    • Diabetes Sister         Unspecified        Health Maintenance Due   Topic Date Due   • ZOSTER VACCINE (1 of 2) Never done   • Pneumococcal Vaccine 65+ (2 - PPSV23 or PCV20) 10/20/2019   • DIABETIC EYE EXAM  03/10/2022        Immunization History   Administered Date(s) Administered   • COVID-19 (MODERNA) 1st, 2nd, 3rd Dose Only  "01/29/2021, 02/26/2021   • COVID-19 (MODERNA) BOOSTER 01/07/2022   • Fluzone High-Dose 65+yrs 12/02/2021   • Influenza, Unspecified 10/01/2020   • Pneumococcal Conjugate 13-Valent (PCV13) 10/20/2018       Review of Systems   Constitutional: Negative for fatigue.   Respiratory: Negative for cough and shortness of breath.    Cardiovascular: Negative for chest pain.   Gastrointestinal: Negative for diarrhea, nausea and vomiting.        Objective       Vitals:    05/26/22 0923 05/26/22 0927   BP: 152/72 138/76   BP Location: Left arm Left arm   Patient Position: Sitting Sitting   Cuff Size: Adult Large Adult   Pulse: 76    Resp: 16    Temp: 97.5 °F (36.4 °C)    TempSrc: Temporal    SpO2: 97%    Weight: 95.6 kg (210 lb 11.2 oz)    Height: 175.3 cm (69\")        Body mass index is 31.11 kg/m².         Physical Exam  Vitals reviewed.   Constitutional:       Appearance: Normal appearance. He is well-developed.   Cardiovascular:      Rate and Rhythm: Normal rate and regular rhythm.      Heart sounds: Normal heart sounds. No murmur heard.  Pulmonary:      Effort: Pulmonary effort is normal.      Breath sounds: Normal breath sounds.   Neurological:      Mental Status: He is alert and oriented to person, place, and time.      Cranial Nerves: No cranial nerve deficit.      Motor: No weakness.   Psychiatric:         Mood and Affect: Mood and affect normal.             Result Review :     The following data was reviewed by: BRIAN Arizmendi on 05/26/2022:    Common labs    Common Labsle 7/6/21 7/6/21 12/2/21 12/2/21 12/2/21 12/2/21 12/2/21 12/20/21 12/20/21    1131 1131 0951 0951 0951 0951 0951 1318 1318   Glucose  112 (A)    103 (A)   116 (A)   BUN  27 (A)    22   29 (A)   Creatinine  1.98 (A)    1.77 (A)   1.90 (A)   eGFR Non African Am  33 (A)    37 (A)   34 (A)   eGFR  Am  39 (A)    45 (A)   41 (A)   Sodium  141    140   142   Potassium  4.1    4.0   4.1   Chloride  108 (A)    108 (A)   110 (A)   Calcium  9.0   "  8.9   9.4   Albumin      4.30      Total Bilirubin      0.2      Alkaline Phosphatase      79      AST (SGOT)      13      ALT (SGPT)      11      WBC 7.76  6.18     7.11    Hemoglobin 13.2  11.8 (A)     11.8 (A)    Hematocrit 41.2  36.1 (A)     36.8 (A)    Platelets 207  218     190    Total Cholesterol     131       Triglycerides     106       HDL Cholesterol     45       LDL Cholesterol      66       Hemoglobin A1C    7.06 (A)        Microalbumin, Urine       3.2     (A) Abnormal value       Comments are available for some flowsheets but are not being displayed.           Most Recent A1C    HGBA1C Most Recent 12/2/21   Hemoglobin A1C 7.06 (A)   (A) Abnormal value                           Assessment and Plan      Diagnoses and all orders for this visit:    1. Type 2 diabetes mellitus without complication, with long-term current use of insulin (HCC) (Primary)  -     CBC & Differential  -     Hemoglobin A1c  -     MicroAlbumin, Urine, Random - Urine, Clean Catch  -     Uric Acid  -     metFORMIN (GLUCOPHAGE) 500 MG tablet; Take 1 tablet by mouth 2 (Two) Times a Day With Meals.  Dispense: 180 tablet; Refill: 1  -     glucose blood test strip; 1 each by Other route Daily. Use as instructed  Dispense: 100 each; Refill: 12  -     Lancets (freestyle) lancets; 1 each by Other route Daily. Use as instructed  Dispense: 100 each; Refill: 12  -     insulin glargine (Lantus) 100 UNIT/ML injection; Inject 30 Units under the skin into the appropriate area as directed Every Night for 60 days.  Dispense: 9 mL; Refill: 1    2. Mixed hyperlipidemia  -     CBC & Differential  -     Lipid Panel  -     pravastatin (PRAVACHOL) 80 MG tablet; Take 1 tablet by mouth Every Night.  Dispense: 90 tablet; Refill: 1    3. Essential hypertension  -     CBC & Differential  -     amLODIPine (NORVASC) 5 MG tablet; Take 1 tablet by mouth Daily.  Dispense: 90 tablet; Refill: 1  -     lisinopril (PRINIVIL,ZESTRIL) 20 MG tablet; Take 1 tablet by mouth  Daily.  Dispense: 90 tablet; Refill: 1    4. Other iron deficiency anemia  -     CBC & Differential  -     Iron Profile  -     Ferritin    5. Acquired hypothyroidism  -     TSH  -     T4, Free  -     levothyroxine (Synthroid) 25 MCG tablet; Take 1 tablet by mouth Daily.  Dispense: 90 tablet; Refill: 1    6. Stage 3 chronic kidney disease, unspecified whether stage 3a or 3b CKD (HCC)  -     Comprehensive Metabolic Panel    7. Vitamin D deficiency  -     Vitamin D 25 Hydroxy    8. Benign prostatic hyperplasia without lower urinary tract symptoms  -     finasteride (PROSCAR) 5 MG tablet; Take 1 tablet by mouth Daily.  Dispense: 90 tablet; Refill: 1  -     tamsulosin (FLOMAX) 0.4 MG capsule 24 hr capsule; Take 1 capsule by mouth Daily.  Dispense: 90 capsule; Refill: 1    9. Screening PSA (prostate specific antigen)  -     PSA Screen    10. Diarrhea, unspecified type            Follow Up     Return in about 6 months (around 11/26/2022) for Medicare Wellness after 6/8/22.  Follow-up in 6 months for labs and appt. Call with any concerns or questions that you may have regarding your medications or history.    I have reviewed all medications and at this time no medications changes need to be adjusted for all chronic conditions.  He will trial the metformin at lunch and dinner and keep me posted.  We will do levsin if not better with that change.  Patient was given instructions and counseling regarding his condition or for health maintenance advice. Please see specific information pulled into the AVS if appropriate.         Lanie Murillo, APRN  05/26/2022

## 2022-05-27 LAB
25(OH)D3 SERPL-MCNC: 48.5 NG/ML (ref 30–100)
ALBUMIN SERPL-MCNC: 4.2 G/DL (ref 3.5–5.2)
ALBUMIN UR-MCNC: 2.8 MG/DL
ALBUMIN/GLOB SERPL: 1.8 G/DL
ALP SERPL-CCNC: 81 U/L (ref 39–117)
ALT SERPL W P-5'-P-CCNC: 14 U/L (ref 1–41)
ANION GAP SERPL CALCULATED.3IONS-SCNC: 13.8 MMOL/L (ref 5–15)
AST SERPL-CCNC: 10 U/L (ref 1–40)
BILIRUB SERPL-MCNC: 0.2 MG/DL (ref 0–1.2)
BUN SERPL-MCNC: 27 MG/DL (ref 8–23)
BUN/CREAT SERPL: 13.4 (ref 7–25)
CALCIUM SPEC-SCNC: 8.8 MG/DL (ref 8.6–10.5)
CHLORIDE SERPL-SCNC: 106 MMOL/L (ref 98–107)
CHOLEST SERPL-MCNC: 125 MG/DL (ref 0–200)
CO2 SERPL-SCNC: 19.2 MMOL/L (ref 22–29)
CREAT SERPL-MCNC: 2.01 MG/DL (ref 0.76–1.27)
EGFRCR SERPLBLD CKD-EPI 2021: 32.3 ML/MIN/1.73
FERRITIN SERPL-MCNC: 376 NG/ML (ref 30–400)
GLOBULIN UR ELPH-MCNC: 2.3 GM/DL
GLUCOSE SERPL-MCNC: 80 MG/DL (ref 65–99)
HBA1C MFR BLD: 7.6 % (ref 4.8–5.6)
HDLC SERPL-MCNC: 43 MG/DL (ref 40–60)
IRON 24H UR-MRATE: 83 MCG/DL (ref 59–158)
IRON SATN MFR SERPL: 30 % (ref 20–50)
LDLC SERPL CALC-MCNC: 63 MG/DL (ref 0–100)
LDLC/HDLC SERPL: 1.44 {RATIO}
POTASSIUM SERPL-SCNC: 4.1 MMOL/L (ref 3.5–5.2)
PROT SERPL-MCNC: 6.5 G/DL (ref 6–8.5)
PSA SERPL-MCNC: 0.97 NG/ML (ref 0–4)
SODIUM SERPL-SCNC: 139 MMOL/L (ref 136–145)
T4 FREE SERPL-MCNC: 1.34 NG/DL (ref 0.93–1.7)
TIBC SERPL-MCNC: 273 MCG/DL (ref 298–536)
TRANSFERRIN SERPL-MCNC: 183 MG/DL (ref 200–360)
TRIGL SERPL-MCNC: 101 MG/DL (ref 0–150)
TSH SERPL DL<=0.05 MIU/L-ACNC: 3.59 UIU/ML (ref 0.27–4.2)
URATE SERPL-MCNC: 7.9 MG/DL (ref 3.4–7)
VLDLC SERPL-MCNC: 19 MG/DL (ref 5–40)

## 2022-05-31 ENCOUNTER — TELEPHONE (OUTPATIENT)
Dept: FAMILY MEDICINE CLINIC | Facility: CLINIC | Age: 82
End: 2022-05-31

## 2022-05-31 NOTE — TELEPHONE ENCOUNTER
----- Message from BRIAN Arizmendi sent at 5/30/2022 10:41 PM EDT -----  Sugar is good control  overall labs are stable.  Recheck in 6 mths

## 2022-06-07 ENCOUNTER — TELEPHONE (OUTPATIENT)
Dept: FAMILY MEDICINE CLINIC | Facility: CLINIC | Age: 82
End: 2022-06-07

## 2022-06-07 DIAGNOSIS — D50.8 OTHER IRON DEFICIENCY ANEMIA: ICD-10-CM

## 2022-06-07 RX ORDER — LANOLIN ALCOHOL/MO/W.PET/CERES
0.4 CREAM (GRAM) TOPICAL DAILY
Qty: 90 TABLET | Refills: 1 | Status: SHIPPED | OUTPATIENT
Start: 2022-06-07 | End: 2022-11-22 | Stop reason: SDUPTHER

## 2022-06-07 RX ORDER — FERROUS SULFATE 325(65) MG
1 TABLET ORAL 2 TIMES DAILY
Qty: 180 TABLET | Refills: 1 | Status: SHIPPED | OUTPATIENT
Start: 2022-06-07 | End: 2022-11-22 | Stop reason: SDUPTHER

## 2022-06-07 NOTE — TELEPHONE ENCOUNTER
Patient wife phone office said folic acid & iron tablets were  not refilled at last appointment . Asking if patient should continue those. Will need refills sent to [facility] today they are  going tomorrow.

## 2022-06-08 DIAGNOSIS — Z79.4 TYPE 2 DIABETES MELLITUS WITHOUT COMPLICATION, WITH LONG-TERM CURRENT USE OF INSULIN: ICD-10-CM

## 2022-06-08 DIAGNOSIS — E11.9 TYPE 2 DIABETES MELLITUS WITHOUT COMPLICATION, WITH LONG-TERM CURRENT USE OF INSULIN: ICD-10-CM

## 2022-06-08 RX ORDER — INSULIN GLARGINE 100 [IU]/ML
30 INJECTION, SOLUTION SUBCUTANEOUS NIGHTLY
Qty: 27 ML | Refills: 1 | Status: SHIPPED | OUTPATIENT
Start: 2022-06-08 | End: 2022-11-22 | Stop reason: SDUPTHER

## 2022-11-22 ENCOUNTER — OFFICE VISIT (OUTPATIENT)
Dept: FAMILY MEDICINE CLINIC | Facility: CLINIC | Age: 82
End: 2022-11-22

## 2022-11-22 VITALS
TEMPERATURE: 97.4 F | DIASTOLIC BLOOD PRESSURE: 88 MMHG | HEART RATE: 78 BPM | OXYGEN SATURATION: 96 % | WEIGHT: 211.7 LBS | HEIGHT: 69 IN | SYSTOLIC BLOOD PRESSURE: 152 MMHG | BODY MASS INDEX: 31.36 KG/M2

## 2022-11-22 DIAGNOSIS — I10 ESSENTIAL HYPERTENSION: ICD-10-CM

## 2022-11-22 DIAGNOSIS — E78.2 MIXED HYPERLIPIDEMIA: ICD-10-CM

## 2022-11-22 DIAGNOSIS — N40.0 BENIGN PROSTATIC HYPERPLASIA WITHOUT LOWER URINARY TRACT SYMPTOMS: ICD-10-CM

## 2022-11-22 DIAGNOSIS — E11.9 TYPE 2 DIABETES MELLITUS WITHOUT COMPLICATION, WITH LONG-TERM CURRENT USE OF INSULIN: ICD-10-CM

## 2022-11-22 DIAGNOSIS — D50.8 OTHER IRON DEFICIENCY ANEMIA: ICD-10-CM

## 2022-11-22 DIAGNOSIS — E03.9 ACQUIRED HYPOTHYROIDISM: ICD-10-CM

## 2022-11-22 DIAGNOSIS — Z00.00 MEDICARE ANNUAL WELLNESS VISIT, SUBSEQUENT: Primary | ICD-10-CM

## 2022-11-22 DIAGNOSIS — H53.9 VISION DISTURBANCE: ICD-10-CM

## 2022-11-22 DIAGNOSIS — Z23 NEED FOR INFLUENZA VACCINATION: ICD-10-CM

## 2022-11-22 DIAGNOSIS — Z79.4 TYPE 2 DIABETES MELLITUS WITHOUT COMPLICATION, WITH LONG-TERM CURRENT USE OF INSULIN: ICD-10-CM

## 2022-11-22 PROBLEM — E87.20 METABOLIC ACIDOSIS: Status: ACTIVE | Noted: 2022-02-01

## 2022-11-22 PROBLEM — M10.9 GOUT: Status: ACTIVE | Noted: 2022-06-21

## 2022-11-22 LAB
ALBUMIN SERPL-MCNC: 4.4 G/DL (ref 3.5–5.2)
ALBUMIN/GLOB SERPL: 1.5 G/DL
ALP SERPL-CCNC: 81 U/L (ref 39–117)
ALT SERPL W P-5'-P-CCNC: 11 U/L (ref 1–41)
ANION GAP SERPL CALCULATED.3IONS-SCNC: 12 MMOL/L (ref 5–15)
AST SERPL-CCNC: 14 U/L (ref 1–40)
BASOPHILS # BLD AUTO: 0.06 10*3/MM3 (ref 0–0.2)
BASOPHILS NFR BLD AUTO: 0.9 % (ref 0–1.5)
BILIRUB SERPL-MCNC: 0.3 MG/DL (ref 0–1.2)
BUN SERPL-MCNC: 24 MG/DL (ref 8–23)
BUN/CREAT SERPL: 13.3 (ref 7–25)
CALCIUM SPEC-SCNC: 9.2 MG/DL (ref 8.6–10.5)
CHLORIDE SERPL-SCNC: 107 MMOL/L (ref 98–107)
CHOLEST SERPL-MCNC: 139 MG/DL (ref 0–200)
CO2 SERPL-SCNC: 23 MMOL/L (ref 22–29)
CREAT SERPL-MCNC: 1.81 MG/DL (ref 0.76–1.27)
DEPRECATED RDW RBC AUTO: 42.8 FL (ref 37–54)
EGFRCR SERPLBLD CKD-EPI 2021: 36.4 ML/MIN/1.73
EOSINOPHIL # BLD AUTO: 0.05 10*3/MM3 (ref 0–0.4)
EOSINOPHIL NFR BLD AUTO: 0.8 % (ref 0.3–6.2)
ERYTHROCYTE [DISTWIDTH] IN BLOOD BY AUTOMATED COUNT: 13.5 % (ref 12.3–15.4)
FERRITIN SERPL-MCNC: 460 NG/ML (ref 30–400)
FOLATE SERPL-MCNC: >20 NG/ML (ref 4.78–24.2)
GLOBULIN UR ELPH-MCNC: 2.9 GM/DL
GLUCOSE SERPL-MCNC: 72 MG/DL (ref 65–99)
HBA1C MFR BLD: 7.1 % (ref 4.8–5.6)
HCT VFR BLD AUTO: 35.9 % (ref 37.5–51)
HDLC SERPL-MCNC: 45 MG/DL (ref 40–60)
HGB BLD-MCNC: 11.6 G/DL (ref 13–17.7)
IMM GRANULOCYTES # BLD AUTO: 0.02 10*3/MM3 (ref 0–0.05)
IMM GRANULOCYTES NFR BLD AUTO: 0.3 % (ref 0–0.5)
IRON 24H UR-MRATE: 39 MCG/DL (ref 59–158)
IRON SATN MFR SERPL: 13 % (ref 20–50)
LDLC SERPL CALC-MCNC: 75 MG/DL (ref 0–100)
LDLC/HDLC SERPL: 1.64 {RATIO}
LYMPHOCYTES # BLD AUTO: 1.35 10*3/MM3 (ref 0.7–3.1)
LYMPHOCYTES NFR BLD AUTO: 20.3 % (ref 19.6–45.3)
MCH RBC QN AUTO: 28.2 PG (ref 26.6–33)
MCHC RBC AUTO-ENTMCNC: 32.3 G/DL (ref 31.5–35.7)
MCV RBC AUTO: 87.3 FL (ref 79–97)
MONOCYTES # BLD AUTO: 0.61 10*3/MM3 (ref 0.1–0.9)
MONOCYTES NFR BLD AUTO: 9.2 % (ref 5–12)
NEUTROPHILS NFR BLD AUTO: 4.55 10*3/MM3 (ref 1.7–7)
NEUTROPHILS NFR BLD AUTO: 68.5 % (ref 42.7–76)
NRBC BLD AUTO-RTO: 0 /100 WBC (ref 0–0.2)
PLATELET # BLD AUTO: 208 10*3/MM3 (ref 140–450)
PMV BLD AUTO: 11.7 FL (ref 6–12)
POTASSIUM SERPL-SCNC: 3.7 MMOL/L (ref 3.5–5.2)
PROT SERPL-MCNC: 7.3 G/DL (ref 6–8.5)
RBC # BLD AUTO: 4.11 10*6/MM3 (ref 4.14–5.8)
SODIUM SERPL-SCNC: 142 MMOL/L (ref 136–145)
TIBC SERPL-MCNC: 294 MCG/DL (ref 298–536)
TRANSFERRIN SERPL-MCNC: 197 MG/DL (ref 200–360)
TRIGL SERPL-MCNC: 100 MG/DL (ref 0–150)
TSH SERPL DL<=0.05 MIU/L-ACNC: 2.55 UIU/ML (ref 0.27–4.2)
VIT B12 BLD-MCNC: 355 PG/ML (ref 211–946)
VLDLC SERPL-MCNC: 19 MG/DL (ref 5–40)
WBC NRBC COR # BLD: 6.64 10*3/MM3 (ref 3.4–10.8)

## 2022-11-22 PROCEDURE — 1159F MED LIST DOCD IN RCRD: CPT | Performed by: NURSE PRACTITIONER

## 2022-11-22 PROCEDURE — 84443 ASSAY THYROID STIM HORMONE: CPT | Performed by: NURSE PRACTITIONER

## 2022-11-22 PROCEDURE — 85025 COMPLETE CBC W/AUTO DIFF WBC: CPT | Performed by: NURSE PRACTITIONER

## 2022-11-22 PROCEDURE — 82728 ASSAY OF FERRITIN: CPT | Performed by: NURSE PRACTITIONER

## 2022-11-22 PROCEDURE — 36415 COLL VENOUS BLD VENIPUNCTURE: CPT | Performed by: NURSE PRACTITIONER

## 2022-11-22 PROCEDURE — G0008 ADMIN INFLUENZA VIRUS VAC: HCPCS | Performed by: NURSE PRACTITIONER

## 2022-11-22 PROCEDURE — 99213 OFFICE O/P EST LOW 20 MIN: CPT | Performed by: NURSE PRACTITIONER

## 2022-11-22 PROCEDURE — 83036 HEMOGLOBIN GLYCOSYLATED A1C: CPT | Performed by: NURSE PRACTITIONER

## 2022-11-22 PROCEDURE — 82746 ASSAY OF FOLIC ACID SERUM: CPT | Performed by: NURSE PRACTITIONER

## 2022-11-22 PROCEDURE — 82607 VITAMIN B-12: CPT | Performed by: NURSE PRACTITIONER

## 2022-11-22 PROCEDURE — 1170F FXNL STATUS ASSESSED: CPT | Performed by: NURSE PRACTITIONER

## 2022-11-22 PROCEDURE — 80053 COMPREHEN METABOLIC PANEL: CPT | Performed by: NURSE PRACTITIONER

## 2022-11-22 PROCEDURE — 84466 ASSAY OF TRANSFERRIN: CPT | Performed by: NURSE PRACTITIONER

## 2022-11-22 PROCEDURE — 80061 LIPID PANEL: CPT | Performed by: NURSE PRACTITIONER

## 2022-11-22 PROCEDURE — 83540 ASSAY OF IRON: CPT | Performed by: NURSE PRACTITIONER

## 2022-11-22 PROCEDURE — 90662 IIV NO PRSV INCREASED AG IM: CPT | Performed by: NURSE PRACTITIONER

## 2022-11-22 PROCEDURE — G0439 PPPS, SUBSEQ VISIT: HCPCS | Performed by: NURSE PRACTITIONER

## 2022-11-22 RX ORDER — FINASTERIDE 5 MG/1
5 TABLET, FILM COATED ORAL DAILY
Qty: 90 TABLET | Refills: 1 | Status: SHIPPED | OUTPATIENT
Start: 2022-11-22

## 2022-11-22 RX ORDER — TAMSULOSIN HYDROCHLORIDE 0.4 MG/1
1 CAPSULE ORAL DAILY
Qty: 90 CAPSULE | Refills: 1 | Status: SHIPPED | OUTPATIENT
Start: 2022-11-22 | End: 2023-02-09 | Stop reason: SDUPTHER

## 2022-11-22 RX ORDER — LANOLIN ALCOHOL/MO/W.PET/CERES
0.4 CREAM (GRAM) TOPICAL DAILY
Qty: 90 TABLET | Refills: 1 | Status: SHIPPED | OUTPATIENT
Start: 2022-11-22

## 2022-11-22 RX ORDER — INSULIN GLARGINE 100 [IU]/ML
28 INJECTION, SOLUTION SUBCUTANEOUS NIGHTLY
Qty: 27 ML | Refills: 1 | Status: SHIPPED | OUTPATIENT
Start: 2022-11-22 | End: 2023-02-09

## 2022-11-22 RX ORDER — INSULIN GLARGINE 100 [IU]/ML
30 INJECTION, SOLUTION SUBCUTANEOUS NIGHTLY
Qty: 27 ML | Refills: 1 | Status: SHIPPED | OUTPATIENT
Start: 2022-11-22 | End: 2022-11-22 | Stop reason: SDUPTHER

## 2022-11-22 RX ORDER — AMLODIPINE BESYLATE 5 MG/1
5 TABLET ORAL DAILY
Qty: 90 TABLET | Refills: 1 | Status: SHIPPED | OUTPATIENT
Start: 2022-11-22

## 2022-11-22 RX ORDER — LISINOPRIL 20 MG/1
20 TABLET ORAL DAILY
Qty: 90 TABLET | Refills: 1 | Status: SHIPPED | OUTPATIENT
Start: 2022-11-22

## 2022-11-22 RX ORDER — LEVOTHYROXINE SODIUM 0.03 MG/1
25 TABLET ORAL DAILY
Qty: 90 TABLET | Refills: 1 | Status: SHIPPED | OUTPATIENT
Start: 2022-11-22

## 2022-11-22 RX ORDER — FERROUS SULFATE 325(65) MG
1 TABLET ORAL 2 TIMES DAILY
Qty: 180 TABLET | Refills: 1 | Status: SHIPPED | OUTPATIENT
Start: 2022-11-22

## 2022-11-22 RX ORDER — PEN NEEDLE, DIABETIC 29 G X1/2"
30 NEEDLE, DISPOSABLE MISCELLANEOUS DAILY
Qty: 1 EACH | Refills: 2 | Status: SHIPPED | OUTPATIENT
Start: 2022-11-22

## 2022-11-22 RX ORDER — SKIN PROTECTANT 44 G/100G
1 OINTMENT TOPICAL DAILY
Qty: 454 G | Refills: 2 | Status: SHIPPED | OUTPATIENT
Start: 2022-11-22

## 2022-11-22 RX ORDER — PRAVASTATIN SODIUM 80 MG/1
80 TABLET ORAL NIGHTLY
Qty: 90 TABLET | Refills: 1 | Status: SHIPPED | OUTPATIENT
Start: 2022-11-22

## 2022-11-22 NOTE — PROGRESS NOTES
The ABCs of the Annual Wellness Visit  Subsequent Medicare Wellness Visit    Chief Complaint   Patient presents with   • Diabetes     Six month follow up    • Medicare Wellness-subsequent      Subjective    History of Present Illness:  Too Velasquez is a 82 y.o. male who presents for a Subsequent Medicare Wellness Visit.    The following portions of the patient's history were reviewed and   updated as appropriate: allergies, current medications, past family history, past medical history, past social history, past surgical history and problem list.    Compared to one year ago, the patient feels his physical   health is the same. Except the vision issues.    His wife has been checking his sugars and his sugars are running  with his 30 units of insulin.  Every evening he gets his insulin but she checks his sugar every morning.  He has been doing good with his diet.  He is having issues with his eyes.  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  He is seeing just a little bit in that right eye.  Compared to one year ago, the patient feels his mental   health is the same.    Recent Hospitalizations:  He was not admitted to the hospital during the last year.       Current Medical Providers:  Patient Care Team:  Lanie Murillo APRN as PCP - General (Nurse Practitioner)    Outpatient Medications Prior to Visit   Medication Sig Dispense Refill   • Alphagan P 0.1 % solution ophthalmic solution      • apixaban (ELIQUIS) 2.5 MG tablet tablet Take 2.5 mg by mouth 2 (Two) Times a Day.     • cholecalciferol (VITAMIN D3) 25 MCG (1000 UT) tablet Take 1,000 Units by mouth Daily.     • dilTIAZem CD (CARDIZEM CD) 240 MG 24 hr capsule Take 240 mg by mouth Daily.     • dorzolamide-timolol (COSOPT) 22.3-6.8 MG/ML ophthalmic solution      • glucose blood test strip 1 each by Other route Daily. Use as instructed 100 each 12   • Lancets (freestyle) lancets 1 each by Other route Daily.  "Use as instructed 100 each 12   • amLODIPine (NORVASC) 5 MG tablet Take 1 tablet by mouth Daily. 90 tablet 1   • BD Insulin Syringe U/F 31G X 5/16\" 0.5 ML misc Inject 30 Units as directed Daily. 1 each 2   • Emollient (DermaPhor) ointment Apply 1 application topically Daily. 454 g 2   • FeroSul 325 (65 Fe) MG tablet Take 1 tablet by mouth 2 (Two) Times a Day. 180 tablet 1   • finasteride (PROSCAR) 5 MG tablet Take 1 tablet by mouth Daily. 90 tablet 1   • folic acid (FOLVITE) 400 MCG tablet Take 1 tablet by mouth Daily. 90 tablet 1   • levothyroxine (Synthroid) 25 MCG tablet Take 1 tablet by mouth Daily. 90 tablet 1   • lisinopril (PRINIVIL,ZESTRIL) 20 MG tablet Take 1 tablet by mouth Daily. 90 tablet 1   • metFORMIN (GLUCOPHAGE) 500 MG tablet Take 1 tablet by mouth 2 (Two) Times a Day With Meals. 180 tablet 1   • pravastatin (PRAVACHOL) 80 MG tablet Take 1 tablet by mouth Every Night. 90 tablet 1   • tamsulosin (FLOMAX) 0.4 MG capsule 24 hr capsule Take 1 capsule by mouth Daily. 90 capsule 1   • insulin glargine (Lantus) 100 UNIT/ML injection Inject 30 Units under the skin into the appropriate area as directed Every Night for 60 days. 27 mL 1     No facility-administered medications prior to visit.       No opioid medication identified on active medication list. I have reviewed chart for other potential  high risk medication/s and harmful drug interactions in the elderly.          Aspirin is not on active medication list.  Aspirin use is contraindicated for this patient due to: current use of Eliquis.  .    Patient Active Problem List   Diagnosis   • Atrial fibrillation (HCC)   • Benign prostatic hyperplasia without lower urinary tract symptoms   • Essential hypertension   • Glaucoma   • Hyperlipidemia   • Insomnia   • Stage 3 chronic kidney disease (HCC)   • Type 2 diabetes mellitus without complication (HCC)   • Vitamin D deficiency   • Iron deficiency anemia secondary to inadequate dietary iron intake   • " "Acquired hypothyroidism   • Absolute anemia   • Diabetes mellitus (HCC)   • Gout   • Metabolic acidosis     Advance Care Planning  Advance Directive is not on file.  ACP discussion was declined by the patient. Patient does not have an advance directive, information provided.          Objective    Vitals:    11/22/22 0833 11/22/22 0847   BP: 170/86 152/88   BP Location: Left arm    Patient Position: Sitting    Cuff Size: Adult    Pulse: 78    Temp: 97.4 °F (36.3 °C)    SpO2: 96%    Weight: 96 kg (211 lb 11.2 oz)    Height: 175.3 cm (69\")      Estimated body mass index is 31.26 kg/m² as calculated from the following:    Height as of this encounter: 175.3 cm (69\").    Weight as of this encounter: 96 kg (211 lb 11.2 oz).    BMI is >= 30 and <35. (Class 1 Obesity). The following options were offered after discussion;: nutrition counseling/recommendations      Does the patient have evidence of cognitive impairment? No    Physical Exam  Vitals reviewed.   Constitutional:       Appearance: Normal appearance. He is well-developed.   Cardiovascular:      Rate and Rhythm: Normal rate and regular rhythm.      Heart sounds: Normal heart sounds. No murmur heard.  Pulmonary:      Effort: Pulmonary effort is normal.      Breath sounds: Normal breath sounds.   Musculoskeletal:      Right lower leg: No edema.      Left lower leg: No edema.   Neurological:      Mental Status: He is alert and oriented to person, place, and time.      Cranial Nerves: No cranial nerve deficit.      Motor: No weakness.   Psychiatric:         Mood and Affect: Mood and affect normal.                 HEALTH RISK ASSESSMENT    Smoking Status:  Social History     Tobacco Use   Smoking Status Never   Smokeless Tobacco Never     Alcohol Consumption:  Social History     Substance and Sexual Activity   Alcohol Use Never     Fall Risk Screen:    STEADI Fall Risk Assessment was completed, and patient is at HIGH risk for falls. Assessment completed " on:11/22/2022    Depression Screening:  PHQ-2/PHQ-9 Depression Screening 11/22/2022   Retired PHQ-9 Total Score -   Retired Total Score -   Little Interest or Pleasure in Doing Things 0-->not at all   Feeling Down, Depressed or Hopeless 2-->more than half the days   Trouble Falling or Staying Asleep, or Sleeping Too Much 3-->nearly every day   Feeling Tired or Having Little Energy 1-->several days   Poor Appetite or Overeating 0-->not at all   Feeling Bad about Yourself - or that You are a Failure or Have Let Yourself or Your Family Down 1-->several days   Trouble Concentrating on Things, Such as Reading the Newspaper or Watching Television 0-->not at all   Moving or Speaking So Slowly that Other People Could Have Noticed? Or the Opposite - Being So Fidgety 0-->not at all   PHQ-9: Brief Depression Severity Measure Score 7   If You Checked Off Any Problems, How Difficult Have These Problems Made It For You to Do Your Work, Take Care of Things at Home, or Get Along with Other People? very difficult       Health Habits and Functional and Cognitive Screening:  Functional & Cognitive Status 11/22/2022   Do you have difficulty preparing food and eating? No   Do you have difficulty bathing yourself, getting dressed or grooming yourself? Yes   Do you have difficulty using the toilet? No   Do you have difficulty moving around from place to place? Yes   Do you have trouble with steps or getting out of a bed or a chair? No   Current Diet Well Balanced Diet   Dental Exam Not up to date   Eye Exam Up to date   Exercise (times per week) 0 times per week   Current Exercises Include No Regular Exercise   Current Exercise Activities Include -   Do you need help using the phone?  No   Are you deaf or do you have serious difficulty hearing?  Yes   Do you need help with transportation? Yes   Do you need help shopping? No   Do you need help preparing meals?  No   Do you need help with housework?  No   Do you need help with laundry? No    Do you need help taking your medications? No   Do you need help managing money? No   Do you ever drive or ride in a car without wearing a seat belt? No   Have you felt unusual stress, anger or loneliness in the last month? Yes   Who do you live with? Spouse   If you need help, do you have trouble finding someone available to you? Yes   Have you been bothered in the last four weeks by sexual problems? No   Do you have difficulty concentrating, remembering or making decisions? No       Age-appropriate Screening Schedule:  Refer to the list below for future screening recommendations based on patient's age, sex and/or medical conditions. Orders for these recommended tests are listed in the plan section. The patient has been provided with a written plan.    Health Maintenance   Topic Date Due   • ZOSTER VACCINE (1 of 2) Never done   • DIABETIC EYE EXAM  03/10/2022   • TDAP/TD VACCINES (1 - Tdap) 05/26/2023 (Originally 7/17/1959)   • HEMOGLOBIN A1C  11/26/2022   • LIPID PANEL  05/26/2023   • URINE MICROALBUMIN  05/26/2023   • INFLUENZA VACCINE  Completed              Assessment & Plan   CMS Preventative Services Quick Reference  Risk Factors Identified During Encounter  Cardiovascular Disease  Depression/Dysphoria  Fall Risk-High or Moderate  Immunizations Discussed/Encouraged (specific Immunizations; Influenza and Shingrix  The above risks/problems have been discussed with the patient.  Follow up actions/plans if indicated are seen below in the Assessment/Plan Section.  Pertinent information has been shared with the patient in the After Visit Summary.    Diagnoses and all orders for this visit:    1. Medicare annual wellness visit, subsequent (Primary)    2. Acquired hypothyroidism  -     levothyroxine (Synthroid) 25 MCG tablet; Take 1 tablet by mouth Daily.  Dispense: 90 tablet; Refill: 1  -     Emollient (DermaPhor) ointment; Apply 1 application topically Daily.  Dispense: 454 g; Refill: 2    3. Benign prostatic  "hyperplasia without lower urinary tract symptoms  -     finasteride (PROSCAR) 5 MG tablet; Take 1 tablet by mouth Daily.  Dispense: 90 tablet; Refill: 1  -     tamsulosin (FLOMAX) 0.4 MG capsule 24 hr capsule; Take 1 capsule by mouth Daily.  Dispense: 90 capsule; Refill: 1    4. Essential hypertension  -     amLODIPine (NORVASC) 5 MG tablet; Take 1 tablet by mouth Daily.  Dispense: 90 tablet; Refill: 1  -     lisinopril (PRINIVIL,ZESTRIL) 20 MG tablet; Take 1 tablet by mouth Daily.  Dispense: 90 tablet; Refill: 1  -     CBC & Differential  -     Comprehensive Metabolic Panel  -     TSH  -     Lipid Panel    5. Mixed hyperlipidemia  -     pravastatin (PRAVACHOL) 80 MG tablet; Take 1 tablet by mouth Every Night.  Dispense: 90 tablet; Refill: 1  -     CBC & Differential  -     Comprehensive Metabolic Panel  -     TSH  -     Lipid Panel    6. Other iron deficiency anemia  -     FeroSul 325 (65 Fe) MG tablet; Take 1 tablet by mouth 2 (Two) Times a Day.  Dispense: 180 tablet; Refill: 1  -     folic acid (FOLVITE) 400 MCG tablet; Take 1 tablet by mouth Daily.  Dispense: 90 tablet; Refill: 1  -     Iron Profile  -     Ferritin  -     Vitamin B12 & Folate    7. Type 2 diabetes mellitus without complication, with long-term current use of insulin (HCC)  -     BD Insulin Syringe U/F 31G X 5/16\" 0.5 ML misc; Inject 30 Units as directed Daily.  Dispense: 1 each; Refill: 2  -     Discontinue: insulin glargine (Lantus) 100 UNIT/ML injection; Inject 30 Units under the skin into the appropriate area as directed Every Night for 60 days.  Dispense: 27 mL; Refill: 1  -     metFORMIN (GLUCOPHAGE) 500 MG tablet; Take 1 tablet by mouth 2 (Two) Times a Day With Meals.  Dispense: 180 tablet; Refill: 1  -     Hemoglobin A1c  -     insulin glargine (Lantus) 100 UNIT/ML injection; Inject 28 Units under the skin into the appropriate area as directed Every Night for 60 days.  Dispense: 27 mL; Refill: 1    8. Vision disturbance    9. Need for " influenza vaccination  -     Fluzone High-Dose 65+yrs        Follow Up:   Return in about 6 months (around 5/22/2023).   Follow-up in 6 months for labs and appt. Call with any concerns or questions that you may have regarding your medications or history.    I have reviewed all medications and at this time no medications changes need to be adjusted for all chronic conditions.  We will adjust the insulin due to low sugars at home.  We may increase insulin further once I get the A1c back.  We will adjust by 2 units currently.  An After Visit Summary and PPPS were made available to the patient.                   Lanie Murillo, APRN  11/22/2022

## 2022-12-06 ENCOUNTER — LAB (OUTPATIENT)
Dept: LAB | Facility: HOSPITAL | Age: 82
End: 2022-12-06

## 2022-12-06 ENCOUNTER — TRANSCRIBE ORDERS (OUTPATIENT)
Dept: LAB | Facility: HOSPITAL | Age: 82
End: 2022-12-06

## 2022-12-06 DIAGNOSIS — E55.9 VITAMIN D DEFICIENCY: ICD-10-CM

## 2022-12-06 DIAGNOSIS — N18.30 STAGE 3 CHRONIC KIDNEY DISEASE, UNSPECIFIED WHETHER STAGE 3A OR 3B CKD: Primary | ICD-10-CM

## 2022-12-06 DIAGNOSIS — N18.30 STAGE 3 CHRONIC KIDNEY DISEASE, UNSPECIFIED WHETHER STAGE 3A OR 3B CKD: ICD-10-CM

## 2022-12-06 LAB
25(OH)D3 SERPL-MCNC: 59.4 NG/ML (ref 30–100)
ANION GAP SERPL CALCULATED.3IONS-SCNC: 11.5 MMOL/L (ref 5–15)
BACTERIA UR QL AUTO: NORMAL /HPF
BASOPHILS # BLD AUTO: 0.06 10*3/MM3 (ref 0–0.2)
BASOPHILS NFR BLD AUTO: 0.7 % (ref 0–1.5)
BILIRUB UR QL STRIP: NEGATIVE
BUN SERPL-MCNC: 26 MG/DL (ref 8–23)
BUN/CREAT SERPL: 12.6 (ref 7–25)
CALCIUM SPEC-SCNC: 8.8 MG/DL (ref 8.6–10.5)
CHLORIDE SERPL-SCNC: 108 MMOL/L (ref 98–107)
CLARITY UR: CLEAR
CO2 SERPL-SCNC: 22.5 MMOL/L (ref 22–29)
COLOR UR: YELLOW
CREAT SERPL-MCNC: 2.06 MG/DL (ref 0.76–1.27)
CREAT UR-MCNC: 177.3 MG/DL
DEPRECATED RDW RBC AUTO: 40.7 FL (ref 37–54)
EGFRCR SERPLBLD CKD-EPI 2021: 31.2 ML/MIN/1.73
EOSINOPHIL # BLD AUTO: 0.1 10*3/MM3 (ref 0–0.4)
EOSINOPHIL NFR BLD AUTO: 1.2 % (ref 0.3–6.2)
ERYTHROCYTE [DISTWIDTH] IN BLOOD BY AUTOMATED COUNT: 13.4 % (ref 12.3–15.4)
GLUCOSE SERPL-MCNC: 118 MG/DL (ref 65–99)
GLUCOSE UR STRIP-MCNC: NEGATIVE MG/DL
HCT VFR BLD AUTO: 33.2 % (ref 37.5–51)
HGB BLD-MCNC: 10.6 G/DL (ref 13–17.7)
HGB UR QL STRIP.AUTO: NEGATIVE
HYALINE CASTS UR QL AUTO: NORMAL /LPF
IMM GRANULOCYTES # BLD AUTO: 0.02 10*3/MM3 (ref 0–0.05)
IMM GRANULOCYTES NFR BLD AUTO: 0.2 % (ref 0–0.5)
KETONES UR QL STRIP: ABNORMAL
LEUKOCYTE ESTERASE UR QL STRIP.AUTO: NEGATIVE
LYMPHOCYTES # BLD AUTO: 3.22 10*3/MM3 (ref 0.7–3.1)
LYMPHOCYTES NFR BLD AUTO: 39.5 % (ref 19.6–45.3)
MCH RBC QN AUTO: 27.1 PG (ref 26.6–33)
MCHC RBC AUTO-ENTMCNC: 31.9 G/DL (ref 31.5–35.7)
MCV RBC AUTO: 84.9 FL (ref 79–97)
MONOCYTES # BLD AUTO: 0.7 10*3/MM3 (ref 0.1–0.9)
MONOCYTES NFR BLD AUTO: 8.6 % (ref 5–12)
NEUTROPHILS NFR BLD AUTO: 4.06 10*3/MM3 (ref 1.7–7)
NEUTROPHILS NFR BLD AUTO: 49.8 % (ref 42.7–76)
NITRITE UR QL STRIP: NEGATIVE
NRBC BLD AUTO-RTO: 0.1 /100 WBC (ref 0–0.2)
PH UR STRIP.AUTO: 5.5 [PH] (ref 5–8)
PHOSPHATE SERPL-MCNC: 2.4 MG/DL (ref 2.5–4.5)
PLATELET # BLD AUTO: 204 10*3/MM3 (ref 140–450)
PMV BLD AUTO: 11.7 FL (ref 6–12)
POTASSIUM SERPL-SCNC: 4 MMOL/L (ref 3.5–5.2)
PROT ?TM UR-MCNC: 28.6 MG/DL
PROT UR QL STRIP: ABNORMAL
PROT/CREAT UR: 0.16 MG/G{CREAT}
PTH-INTACT SERPL-MCNC: 81.7 PG/ML (ref 15–65)
RBC # BLD AUTO: 3.91 10*6/MM3 (ref 4.14–5.8)
RBC # UR STRIP: NORMAL /HPF
REF LAB TEST METHOD: NORMAL
SODIUM SERPL-SCNC: 142 MMOL/L (ref 136–145)
SP GR UR STRIP: 1.02 (ref 1–1.03)
SQUAMOUS #/AREA URNS HPF: NORMAL /HPF
UROBILINOGEN UR QL STRIP: ABNORMAL
WBC # UR STRIP: NORMAL /HPF
WBC NRBC COR # BLD: 8.16 10*3/MM3 (ref 3.4–10.8)

## 2022-12-06 PROCEDURE — 82306 VITAMIN D 25 HYDROXY: CPT

## 2022-12-06 PROCEDURE — 82570 ASSAY OF URINE CREATININE: CPT

## 2022-12-06 PROCEDURE — 83970 ASSAY OF PARATHORMONE: CPT

## 2022-12-06 PROCEDURE — 36415 COLL VENOUS BLD VENIPUNCTURE: CPT

## 2022-12-06 PROCEDURE — 80048 BASIC METABOLIC PNL TOTAL CA: CPT

## 2022-12-06 PROCEDURE — 81001 URINALYSIS AUTO W/SCOPE: CPT

## 2022-12-06 PROCEDURE — 84156 ASSAY OF PROTEIN URINE: CPT

## 2022-12-06 PROCEDURE — 85025 COMPLETE CBC W/AUTO DIFF WBC: CPT

## 2022-12-06 PROCEDURE — 84100 ASSAY OF PHOSPHORUS: CPT

## 2022-12-15 ENCOUNTER — TELEPHONE (OUTPATIENT)
Dept: FAMILY MEDICINE CLINIC | Facility: CLINIC | Age: 82
End: 2022-12-15

## 2022-12-15 NOTE — TELEPHONE ENCOUNTER
Hazel with [facility] requesting rx for gerneric glucose meter lancets, strips.   The one patient has costing $90.00.

## 2023-02-09 ENCOUNTER — OFFICE VISIT (OUTPATIENT)
Dept: UROLOGY | Facility: CLINIC | Age: 83
End: 2023-02-09
Payer: MEDICARE

## 2023-02-09 VITALS — WEIGHT: 212.8 LBS | HEIGHT: 69 IN | BODY MASS INDEX: 31.52 KG/M2 | RESPIRATION RATE: 14 BRPM

## 2023-02-09 DIAGNOSIS — R31.9 HEMATURIA, UNSPECIFIED TYPE: Primary | ICD-10-CM

## 2023-02-09 DIAGNOSIS — N40.0 BENIGN PROSTATIC HYPERPLASIA WITHOUT LOWER URINARY TRACT SYMPTOMS: ICD-10-CM

## 2023-02-09 LAB
BILIRUB BLD-MCNC: NEGATIVE MG/DL
CLARITY, POC: CLEAR
COLOR UR: YELLOW
EXPIRATION DATE: ABNORMAL
GLUCOSE UR STRIP-MCNC: ABNORMAL MG/DL
KETONES UR QL: NEGATIVE
LEUKOCYTE EST, POC: ABNORMAL
Lab: ABNORMAL
NITRITE UR-MCNC: NEGATIVE MG/ML
PH UR: 5 [PH] (ref 5–8)
PROT UR STRIP-MCNC: ABNORMAL MG/DL
RBC # UR STRIP: ABNORMAL /UL
SP GR UR: 1.02 (ref 1–1.03)
SPECIMEN VOL 24H UR: 6 L
UROBILINOGEN UR QL: ABNORMAL

## 2023-02-09 PROCEDURE — 81003 URINALYSIS AUTO W/O SCOPE: CPT | Performed by: NURSE PRACTITIONER

## 2023-02-09 PROCEDURE — 51798 US URINE CAPACITY MEASURE: CPT | Performed by: NURSE PRACTITIONER

## 2023-02-09 PROCEDURE — 99214 OFFICE O/P EST MOD 30 MIN: CPT | Performed by: NURSE PRACTITIONER

## 2023-02-09 RX ORDER — TAMSULOSIN HYDROCHLORIDE 0.4 MG/1
2 CAPSULE ORAL DAILY
Qty: 180 CAPSULE | Refills: 3 | Status: SHIPPED | OUTPATIENT
Start: 2023-02-09

## 2023-02-09 NOTE — PROGRESS NOTES
"Chief Complaint: Benign Prostatic Hypertrophy    Subjective         History of Present Illness  Too Velasquez is a 82 y.o. male presents to [facility] to be seen for BPH.    He reports nocturia x 5 a night.     He has urinary urgency and frequency and urge incontinence.     He states he drinks iced tea but refrains from drinking due to urinary frequency.     He stops drinking several hours before bed.     He has been on tamsulosoin 0.4 mg and finasteride 5mg for years.     He reports his symptoms have been worsening for several years.     Objective     Past Medical History:   Diagnosis Date   • BPH (benign prostatic hyperplasia) 03/17/2021   • Diabetes (HCC)    • Essential hypertension 03/17/2021   • Glaucoma    • Headache    • High cholesterol    • Hyperlipidemia 03/17/2021   • Insomnia    • Irregular heart rate    • Sinusitis    • SOB (shortness of breath)        Past Surgical History:   Procedure Laterality Date   • CATARACT EXTRACTION  2008   • TUMOR REMOVAL Left     wind pipe left breast         Current Outpatient Medications:   •  Alphagan P 0.1 % solution ophthalmic solution, , Disp: , Rfl:   •  amLODIPine (NORVASC) 5 MG tablet, Take 1 tablet by mouth Daily., Disp: 90 tablet, Rfl: 1  •  apixaban (ELIQUIS) 2.5 MG tablet tablet, Take 2.5 mg by mouth 2 (Two) Times a Day., Disp: , Rfl:   •  BD Insulin Syringe U/F 31G X 5/16\" 0.5 ML misc, Inject 30 Units as directed Daily., Disp: 1 each, Rfl: 2  •  cholecalciferol (VITAMIN D3) 25 MCG (1000 UT) tablet, Take 1,000 Units by mouth Daily., Disp: , Rfl:   •  dilTIAZem CD (CARDIZEM CD) 240 MG 24 hr capsule, Take 240 mg by mouth Daily., Disp: , Rfl:   •  dorzolamide-timolol (COSOPT) 22.3-6.8 MG/ML ophthalmic solution, , Disp: , Rfl:   •  Emollient (DermaPhor) ointment, Apply 1 application topically Daily., Disp: 454 g, Rfl: 2  •  FeroSul 325 (65 Fe) MG tablet, Take 1 tablet by mouth 2 (Two) Times a Day., Disp: 180 tablet, Rfl: 1  •  finasteride " "(PROSCAR) 5 MG tablet, Take 1 tablet by mouth Daily., Disp: 90 tablet, Rfl: 1  •  folic acid (FOLVITE) 400 MCG tablet, Take 1 tablet by mouth Daily., Disp: 90 tablet, Rfl: 1  •  glucose blood test strip, 1 each by Other route Daily. Use as instructed, Disp: 100 each, Rfl: 12  •  insulin glargine (Lantus) 100 UNIT/ML injection, Inject 28 Units under the skin into the appropriate area as directed Every Night for 60 days., Disp: 27 mL, Rfl: 1  •  Lancets (freestyle) lancets, 1 each by Other route Daily. Use as instructed, Disp: 100 each, Rfl: 12  •  levothyroxine (Synthroid) 25 MCG tablet, Take 1 tablet by mouth Daily., Disp: 90 tablet, Rfl: 1  •  lisinopril (PRINIVIL,ZESTRIL) 20 MG tablet, Take 1 tablet by mouth Daily., Disp: 90 tablet, Rfl: 1  •  metFORMIN (GLUCOPHAGE) 500 MG tablet, Take 1 tablet by mouth 2 (Two) Times a Day With Meals., Disp: 180 tablet, Rfl: 1  •  pravastatin (PRAVACHOL) 80 MG tablet, Take 1 tablet by mouth Every Night., Disp: 90 tablet, Rfl: 1  •  tamsulosin (FLOMAX) 0.4 MG capsule 24 hr capsule, Take 2 capsules by mouth Daily., Disp: 180 capsule, Rfl: 3    Allergies   Allergen Reactions   • Penicillins Rash        Family History   Problem Relation Age of Onset   • Heart disease Mother    • Diabetes Mother         Unspecified   • Heart disease Father    • Diabetes Sister         Unspecified       Social History     Socioeconomic History   • Marital status:    Tobacco Use   • Smoking status: Never   • Smokeless tobacco: Never   Vaping Use   • Vaping Use: Never used   Substance and Sexual Activity   • Alcohol use: Never   • Drug use: Defer   • Sexual activity: Defer       Vital Signs:   Resp 14   Ht 175.3 cm (69\")   Wt 96.5 kg (212 lb 12.8 oz)   BMI 31.43 kg/m²      Physical Exam     Result Review :   The following data was reviewed by: BRIAN Herman on 02/09/2023:  Results for orders placed or performed in visit on 02/09/23   Bladder Scan   Result Value Ref Range    Volume 6  "   POC Urinalysis Dipstick, Automated    Specimen: Urine   Result Value Ref Range    Color Yellow Yellow, Straw, Dark Yellow, Fara    Clarity, UA Clear Clear    Specific Gravity  1.020 1.005 - 1.030    pH, Urine 5.0 5.0 - 8.0    Leukocytes Trace (A) Negative    Nitrite, UA Negative Negative    Protein, POC 30 mg/dL (A) Negative mg/dL    Glucose,  mg/dL (A) Negative mg/dL    Ketones, UA Negative Negative    Urobilinogen, UA 0.2 E.U./dL Normal, 0.2 E.U./dL    Bilirubin Negative Negative    Blood, UA Trace (A) Negative    Lot Number [id number]     Expiration Date 4/2,024       PSA    PSA 5/26/22   PSA 0.970           Bladder Scan interpretation 02/09/2023    Estimation of residual urine via [employer] Bladder Scan  MA/nurse performing: Carol FARIAS MA   Residual Urine: 6 ml  Indication: Hematuria, unspecified type    Benign prostatic hyperplasia without lower urinary tract symptoms   Position: Supine  Examination: Incremental scanning of the suprapubic area using 2.0 MHz transducer using copious amounts of acoustic gel.   Findings: An anechoic area was demonstrated which represented the bladder, with measurement of residual urine as noted. I inspected this myself. In that the residual urine was stable or insignificant, refer to plan for treatment and plan necessary at this time.         Procedures        Assessment and Plan    Diagnoses and all orders for this visit:    1. Hematuria, unspecified type (Primary)  -     Bladder Scan  -     POC Urinalysis Dipstick, Automated    2. Benign prostatic hyperplasia without lower urinary tract symptoms  -     tamsulosin (FLOMAX) 0.4 MG capsule 24 hr capsule; Take 2 capsules by mouth Daily.  Dispense: 180 capsule; Refill: 3        Nocturia- Discussed with patient that nocturia is a common condition that is multifactorial in nature and can be difficult to treat, unlikely to completely irradicate, and management is dictated by patient motivation to improve and cope with  symptoms.  discussed with patient, recommended behavioral modifications including fluid management, limiting fluids prior to sleep, and voiding immediately prior to sleep. Recommended that patient lay supine in the afternoon with feet above heart level to assist wtih mobilizing dependent edema which typically occurs while supine during sleep. Also, although no history of sleep apnea, could consider workup as studies have shown that with treatment of sleep apnea, nocuria can be significantly reduced. All questions addressed.     We will try increasing his tamsulosin dosage to better control of his urinary symptoms.    Discussed with the patient that dehydration can play a role in urinary urgency and frequency given concentrated urine will often become caustic to the bladder.      I spent 15 minutes caring for Too on this date of service. This time includes time spent by me in the following activities:reviewing tests, obtaining and/or reviewing a separately obtained history, performing a medically appropriate examination and/or evaluation , counseling and educating the patient/family/caregiver, ordering medications, tests, or procedures, and documenting information in the medical record  Follow Up   Return in about 8 weeks (around 4/6/2023) for f/u bph .  Patient was given instructions and counseling regarding his condition or for health maintenance advice. Please see specific information pulled into the AVS if appropriate.         This document has been electronically signed by BRIAN Herman  February 9, 2023 10:56 EST

## 2023-04-13 ENCOUNTER — TELEPHONE (OUTPATIENT)
Dept: UROLOGY | Facility: CLINIC | Age: 83
End: 2023-04-13
Payer: MEDICARE

## 2023-04-26 NOTE — TELEPHONE ENCOUNTER
CALLED PT TO RS CX APPT W/[facility]/PT SAID HE CAN'T DO ANYTHING OR SEE ANYTHING SO HE DOES NOT WANT TO RS/ANYTHING ELSE TO DO??

## 2023-05-17 ENCOUNTER — OFFICE VISIT (OUTPATIENT)
Dept: FAMILY MEDICINE CLINIC | Facility: CLINIC | Age: 83
End: 2023-05-17
Payer: MEDICARE

## 2023-05-17 VITALS
HEIGHT: 69 IN | HEART RATE: 62 BPM | SYSTOLIC BLOOD PRESSURE: 156 MMHG | WEIGHT: 209.9 LBS | BODY MASS INDEX: 31.09 KG/M2 | TEMPERATURE: 97.9 F | DIASTOLIC BLOOD PRESSURE: 90 MMHG | OXYGEN SATURATION: 97 %

## 2023-05-17 DIAGNOSIS — E03.9 ACQUIRED HYPOTHYROIDISM: ICD-10-CM

## 2023-05-17 DIAGNOSIS — I10 ESSENTIAL HYPERTENSION: ICD-10-CM

## 2023-05-17 DIAGNOSIS — Z79.4 TYPE 2 DIABETES MELLITUS WITHOUT COMPLICATION, WITH LONG-TERM CURRENT USE OF INSULIN: Primary | ICD-10-CM

## 2023-05-17 DIAGNOSIS — D50.8 OTHER IRON DEFICIENCY ANEMIA: ICD-10-CM

## 2023-05-17 DIAGNOSIS — E11.9 TYPE 2 DIABETES MELLITUS WITHOUT COMPLICATION, WITH LONG-TERM CURRENT USE OF INSULIN: Primary | ICD-10-CM

## 2023-05-17 DIAGNOSIS — E78.2 MIXED HYPERLIPIDEMIA: ICD-10-CM

## 2023-05-17 DIAGNOSIS — N40.0 BENIGN PROSTATIC HYPERPLASIA WITHOUT LOWER URINARY TRACT SYMPTOMS: ICD-10-CM

## 2023-05-17 PROCEDURE — 82043 UR ALBUMIN QUANTITATIVE: CPT | Performed by: NURSE PRACTITIONER

## 2023-05-17 RX ORDER — FERROUS SULFATE 325(65) MG
1 TABLET ORAL 2 TIMES DAILY
Qty: 180 TABLET | Refills: 1 | Status: SHIPPED | OUTPATIENT
Start: 2023-05-17

## 2023-05-17 RX ORDER — FINASTERIDE 5 MG/1
5 TABLET, FILM COATED ORAL DAILY
Qty: 90 TABLET | Refills: 1 | Status: SHIPPED | OUTPATIENT
Start: 2023-05-17

## 2023-05-17 RX ORDER — LEVOTHYROXINE SODIUM 0.03 MG/1
25 TABLET ORAL DAILY
Qty: 90 TABLET | Refills: 1 | Status: SHIPPED | OUTPATIENT
Start: 2023-05-17

## 2023-05-17 RX ORDER — PEN NEEDLE, DIABETIC 29 G X1/2"
30 NEEDLE, DISPOSABLE MISCELLANEOUS DAILY
Qty: 1 EACH | Refills: 2 | Status: SHIPPED | OUTPATIENT
Start: 2023-05-17

## 2023-05-17 RX ORDER — PRAVASTATIN SODIUM 80 MG/1
80 TABLET ORAL NIGHTLY
Qty: 90 TABLET | Refills: 1 | Status: SHIPPED | OUTPATIENT
Start: 2023-05-17

## 2023-05-17 RX ORDER — LANOLIN ALCOHOL/MO/W.PET/CERES
0.4 CREAM (GRAM) TOPICAL DAILY
Qty: 90 TABLET | Refills: 1 | Status: SHIPPED | OUTPATIENT
Start: 2023-05-17

## 2023-05-17 RX ORDER — LISINOPRIL 20 MG/1
20 TABLET ORAL DAILY
Qty: 90 TABLET | Refills: 1 | Status: SHIPPED | OUTPATIENT
Start: 2023-05-17

## 2023-05-17 RX ORDER — AMLODIPINE BESYLATE 5 MG/1
5 TABLET ORAL DAILY
Qty: 90 TABLET | Refills: 1 | Status: SHIPPED | OUTPATIENT
Start: 2023-05-17

## 2023-05-17 RX ORDER — INSULIN GLARGINE 100 [IU]/ML
28 INJECTION, SOLUTION SUBCUTANEOUS NIGHTLY
Qty: 30 ML | Refills: 1 | Status: SHIPPED | OUTPATIENT
Start: 2023-05-17

## 2023-05-17 RX ORDER — TAMSULOSIN HYDROCHLORIDE 0.4 MG/1
2 CAPSULE ORAL DAILY
Qty: 180 CAPSULE | Refills: 3 | Status: CANCELLED | OUTPATIENT
Start: 2023-05-17

## 2023-05-17 NOTE — PROGRESS NOTES
Chief Complaint  Hypertension, Hyperlipidemia, Hypothyroidism, and Diabetes (6 month follow up )    Subjective          Too Velasquez presents to [facility]  History of Present Illness  He is here with his wife today  HTN; he is taking his Norvasc/lisinopril daily.  No chest pain shortness of breath noted.  LIPID: He is taking his pravastatin with no leg pain.  DM: He has been checking his sugars daily.  He is on insulin currently.  He is not having anything higher than 120 in the mornings.    BPH: He is taking his Flomax without any issues regarding his prostate but he still does have some frequency.  Urology has him currently on Flomax and they also wanted him on finasteride..  ANEMIA: He is taking his folic acid and his iron daily no blood in the stool that he can see.  THYROID: He is taking his Synthroid daily at 25 mcg he cannot tell any difference.  CKD: Kidneys are stable by last set of labs.       Allergies  Penicillins    Social History     Tobacco Use   • Smoking status: Never   • Smokeless tobacco: Never   Vaping Use   • Vaping Use: Never used   Substance Use Topics   • Alcohol use: Never   • Drug use: Defer       Family History   Problem Relation Age of Onset   • Heart disease Mother    • Diabetes Mother         Unspecified   • Heart disease Father    • Diabetes Sister         Unspecified        Health Maintenance Due   Topic Date Due   • ZOSTER VACCINE (1 of 2) Never done   • Pneumococcal Vaccine 65+ (2 - PPSV23 if available, else PCV20) 10/20/2019   • DIABETIC EYE EXAM  03/10/2022        Immunization History   Administered Date(s) Administered   • COVID-19 (MODERNA) 1st,2nd,3rd Dose Monovalent 01/29/2021, 02/26/2021, 11/01/2021, 01/07/2022   • COVID-19 (MODERNA) BIVALENT 12+YRS 11/21/2022   • COVID-19 (MODERNA) Monovalent Original Booster 01/07/2022   • Flu Vaccine Quad PF >36MO 10/01/2020   • Fluzone High-Dose 65+yrs 12/02/2021, 11/22/2022   • Influenza, Unspecified  "10/01/2020, 12/02/2021   • Pneumococcal Conjugate 13-Valent (PCV13) 10/20/2018       Review of Systems   Constitutional: Negative for fatigue.   Respiratory: Negative for cough and shortness of breath.    Cardiovascular: Negative for chest pain.   Gastrointestinal: Negative for diarrhea, nausea and vomiting.        Objective       Vitals:    05/17/23 0937 05/17/23 0942 05/17/23 0948   BP:  168/83 156/90   Pulse:  62    Temp:  97.9 °F (36.6 °C)    SpO2:  97%    Weight: 95.2 kg (209 lb 14.4 oz) 95.2 kg (209 lb 14.4 oz)    Height:  175.3 cm (69\")        Body mass index is 31 kg/m².         Physical Exam  Vitals reviewed.   Constitutional:       Appearance: Normal appearance. He is well-developed.   Cardiovascular:      Rate and Rhythm: Normal rate and regular rhythm.      Heart sounds: Normal heart sounds. No murmur heard.  Pulmonary:      Effort: Pulmonary effort is normal.      Breath sounds: Normal breath sounds.   Musculoskeletal:      Right lower leg: No edema.      Left lower leg: Edema present.   Neurological:      Mental Status: He is alert and oriented to person, place, and time.      Cranial Nerves: No cranial nerve deficit.      Motor: No weakness.   Psychiatric:         Mood and Affect: Mood and affect normal.             Result Review :     The following data was reviewed by: BRIAN Arizmendi on 05/17/2023:    Common Labs   Common labs        5/26/2022    09:51 11/22/2022    09:09 12/6/2022    12:57   Common Labs   Glucose 80   72   118     BUN 27   24   26     Creatinine 2.01   1.81   2.06     Sodium 139   142   142     Potassium 4.1   3.7   4.0     Chloride 106   107   108     Calcium 8.8   9.2   8.8     Albumin 4.20   4.40      Total Bilirubin 0.2   0.3      Alkaline Phosphatase 81   81      AST (SGOT) 10   14      ALT (SGPT) 14   11      WBC 6.30   6.64   8.16     Hemoglobin 10.9   11.6   10.6     Hematocrit 34.3   35.9   33.2     Platelets 192   208   204     Total Cholesterol 125   139    " "  Triglycerides 101   100      HDL Cholesterol 43   45      LDL Cholesterol  63   75      Hemoglobin A1C 7.60   7.10      Microalbumin, Urine 2.8       PSA 0.970       Uric Acid 7.9                        Assessment and Plan      Diagnoses and all orders for this visit:    1. Type 2 diabetes mellitus without complication, with long-term current use of insulin (Primary)  -     CBC & Differential  -     Comprehensive Metabolic Panel  -     TSH  -     Lipid Panel  -     Hemoglobin A1c  -     MicroAlbumin, Urine, Random - Urine, Clean Catch  -     Vitamin D,25-Hydroxy  -     Vitamin B12 & Folate  -     BD Insulin Syringe U/F 31G X 5/16\" 0.5 ML misc; Inject 30 Units as directed Daily.  Dispense: 1 each; Refill: 2  -     insulin glargine (Lantus) 100 UNIT/ML injection; Inject 28 Units under the skin into the appropriate area as directed Every Night.  Dispense: 30 mL; Refill: 1  -     metFORMIN (GLUCOPHAGE) 500 MG tablet; Take 1 tablet by mouth 2 (Two) Times a Day With Meals.  Dispense: 180 tablet; Refill: 1  -     Hemoglobin A1c; Future    2. Essential hypertension  -     CBC & Differential  -     Comprehensive Metabolic Panel  -     TSH  -     Lipid Panel  -     Vitamin D,25-Hydroxy  -     Vitamin B12 & Folate  -     amLODIPine (NORVASC) 5 MG tablet; Take 1 tablet by mouth Daily.  Dispense: 90 tablet; Refill: 1  -     lisinopril (PRINIVIL,ZESTRIL) 20 MG tablet; Take 1 tablet by mouth Daily.  Dispense: 90 tablet; Refill: 1  -     Comprehensive Metabolic Panel; Future  -     CBC & Differential; Future  -     Lipid Panel; Future    3. Other iron deficiency anemia  -     Iron Profile  -     Ferritin  -     FeroSul 325 (65 Fe) MG tablet; Take 1 tablet by mouth 2 (Two) Times a Day.  Dispense: 180 tablet; Refill: 1  -     folic acid (FOLVITE) 400 MCG tablet; Take 1 tablet by mouth Daily.  Dispense: 90 tablet; Refill: 1  -     Ferritin; Future  -     Vitamin B12 & Folate; Future  -     Iron Profile; Future    4. Benign prostatic " hyperplasia without lower urinary tract symptoms  -     finasteride (PROSCAR) 5 MG tablet; Take 1 tablet by mouth Daily.  Dispense: 90 tablet; Refill: 1    5. Acquired hypothyroidism  -     CBC & Differential  -     Comprehensive Metabolic Panel  -     TSH  -     Lipid Panel  -     T4, Free  -     levothyroxine (Synthroid) 25 MCG tablet; Take 1 tablet by mouth Daily.  Dispense: 90 tablet; Refill: 1  -     TSH; Future  -     T4, Free; Future    6. Mixed hyperlipidemia  -     CBC & Differential  -     Comprehensive Metabolic Panel  -     TSH  -     Lipid Panel  -     pravastatin (PRAVACHOL) 80 MG tablet; Take 1 tablet by mouth Every Night.  Dispense: 90 tablet; Refill: 1  -     Comprehensive Metabolic Panel; Future  -     CBC & Differential; Future  -     Lipid Panel; Future    7. Body mass index (BMI) 31.0-31.9, adult  -     Vitamin D,25-Hydroxy  -     Vitamin D,25-Hydroxy; Future            Follow Up     Return in about 6 months (around 11/17/2023) for Medicare Wellness 11/22/2022.  Body mass index is 31 kg/m².  Follow-up in 6 months for labs and appt. Call with any concerns or questions that you may have regarding your medications or history.    I have reviewed all medications and at this time no medications changes need to be adjusted for all chronic conditions.    Patient was given instructions and counseling regarding his condition or for health maintenance advice. Please see specific information pulled into the AVS if appropriate.     Parts of this note are electronic transcriptions/translations of spoken language to printed text using the Dragon Dictation system.          Lanie Murillo, APRN  05/17/2023

## 2023-05-18 LAB — ALBUMIN UR-MCNC: 8.3 MG/DL

## 2023-05-23 ENCOUNTER — TRANSCRIBE ORDERS (OUTPATIENT)
Dept: LAB | Facility: HOSPITAL | Age: 83
End: 2023-05-23
Payer: OTHER GOVERNMENT

## 2023-05-23 ENCOUNTER — LAB (OUTPATIENT)
Dept: LAB | Facility: HOSPITAL | Age: 83
End: 2023-05-23
Payer: MEDICARE

## 2023-05-23 DIAGNOSIS — N18.30 STAGE 3 CHRONIC KIDNEY DISEASE, UNSPECIFIED WHETHER STAGE 3A OR 3B CKD: ICD-10-CM

## 2023-05-23 DIAGNOSIS — N18.30 STAGE 3 CHRONIC KIDNEY DISEASE, UNSPECIFIED WHETHER STAGE 3A OR 3B CKD: Primary | ICD-10-CM

## 2023-05-23 DIAGNOSIS — Z79.4 TYPE 2 DIABETES MELLITUS WITHOUT COMPLICATION, WITH LONG-TERM CURRENT USE OF INSULIN: ICD-10-CM

## 2023-05-23 DIAGNOSIS — E11.9 TYPE 2 DIABETES MELLITUS WITHOUT COMPLICATION, WITH LONG-TERM CURRENT USE OF INSULIN: ICD-10-CM

## 2023-05-23 DIAGNOSIS — E03.9 ACQUIRED HYPOTHYROIDISM: ICD-10-CM

## 2023-05-23 DIAGNOSIS — E78.2 MIXED HYPERLIPIDEMIA: ICD-10-CM

## 2023-05-23 DIAGNOSIS — I10 ESSENTIAL HYPERTENSION: ICD-10-CM

## 2023-05-23 DIAGNOSIS — D50.8 OTHER IRON DEFICIENCY ANEMIA: ICD-10-CM

## 2023-05-23 LAB
25(OH)D3 SERPL-MCNC: 59.2 NG/ML (ref 30–100)
ALBUMIN SERPL-MCNC: 4.2 G/DL (ref 3.5–5.2)
ALBUMIN/GLOB SERPL: 1.3 G/DL
ALP SERPL-CCNC: 76 U/L (ref 39–117)
ALT SERPL W P-5'-P-CCNC: 13 U/L (ref 1–41)
ANION GAP SERPL CALCULATED.3IONS-SCNC: 11.9 MMOL/L (ref 5–15)
AST SERPL-CCNC: 14 U/L (ref 1–40)
BACTERIA UR QL AUTO: ABNORMAL /HPF
BASOPHILS # BLD AUTO: 0.05 10*3/MM3 (ref 0–0.2)
BASOPHILS NFR BLD AUTO: 0.6 % (ref 0–1.5)
BILIRUB SERPL-MCNC: 0.3 MG/DL (ref 0–1.2)
BILIRUB UR QL STRIP: NEGATIVE
BUN SERPL-MCNC: 22 MG/DL (ref 8–23)
BUN/CREAT SERPL: 11.8 (ref 7–25)
CALCIUM SPEC-SCNC: 9.3 MG/DL (ref 8.6–10.5)
CHLORIDE SERPL-SCNC: 110 MMOL/L (ref 98–107)
CHOLEST SERPL-MCNC: 141 MG/DL (ref 0–200)
CLARITY UR: CLEAR
CO2 SERPL-SCNC: 22.1 MMOL/L (ref 22–29)
COLOR UR: YELLOW
CREAT SERPL-MCNC: 1.86 MG/DL (ref 0.76–1.27)
CREAT UR-MCNC: 196.6 MG/DL
DEPRECATED RDW RBC AUTO: 43.5 FL (ref 37–54)
EGFRCR SERPLBLD CKD-EPI 2021: 35.2 ML/MIN/1.73
EOSINOPHIL # BLD AUTO: 0.08 10*3/MM3 (ref 0–0.4)
EOSINOPHIL NFR BLD AUTO: 1 % (ref 0.3–6.2)
ERYTHROCYTE [DISTWIDTH] IN BLOOD BY AUTOMATED COUNT: 13.6 % (ref 12.3–15.4)
FERRITIN SERPL-MCNC: 394 NG/ML (ref 30–400)
FOLATE SERPL-MCNC: >20 NG/ML (ref 4.78–24.2)
GLOBULIN UR ELPH-MCNC: 3.3 GM/DL
GLUCOSE SERPL-MCNC: 71 MG/DL (ref 65–99)
GLUCOSE UR STRIP-MCNC: NEGATIVE MG/DL
HBA1C MFR BLD: 7.1 % (ref 4.8–5.6)
HCT VFR BLD AUTO: 37.1 % (ref 37.5–51)
HDLC SERPL-MCNC: 45 MG/DL (ref 40–60)
HGB BLD-MCNC: 12 G/DL (ref 13–17.7)
HGB UR QL STRIP.AUTO: NEGATIVE
HYALINE CASTS UR QL AUTO: ABNORMAL /LPF
IMM GRANULOCYTES # BLD AUTO: 0.02 10*3/MM3 (ref 0–0.05)
IMM GRANULOCYTES NFR BLD AUTO: 0.3 % (ref 0–0.5)
IRON 24H UR-MRATE: 64 MCG/DL (ref 59–158)
IRON SATN MFR SERPL: 22 % (ref 20–50)
KETONES UR QL STRIP: NEGATIVE
LDLC SERPL CALC-MCNC: 80 MG/DL (ref 0–100)
LDLC/HDLC SERPL: 1.75 {RATIO}
LEUKOCYTE ESTERASE UR QL STRIP.AUTO: ABNORMAL
LYMPHOCYTES # BLD AUTO: 3.21 10*3/MM3 (ref 0.7–3.1)
LYMPHOCYTES NFR BLD AUTO: 40.1 % (ref 19.6–45.3)
MCH RBC QN AUTO: 28 PG (ref 26.6–33)
MCHC RBC AUTO-ENTMCNC: 32.3 G/DL (ref 31.5–35.7)
MCV RBC AUTO: 86.7 FL (ref 79–97)
MONOCYTES # BLD AUTO: 0.66 10*3/MM3 (ref 0.1–0.9)
MONOCYTES NFR BLD AUTO: 8.3 % (ref 5–12)
NEUTROPHILS NFR BLD AUTO: 3.98 10*3/MM3 (ref 1.7–7)
NEUTROPHILS NFR BLD AUTO: 49.7 % (ref 42.7–76)
NITRITE UR QL STRIP: NEGATIVE
NRBC BLD AUTO-RTO: 0 /100 WBC (ref 0–0.2)
PH UR STRIP.AUTO: 5.5 [PH] (ref 5–8)
PHOSPHATE SERPL-MCNC: 3.5 MG/DL (ref 2.5–4.5)
PLATELET # BLD AUTO: 213 10*3/MM3 (ref 140–450)
PMV BLD AUTO: 11.6 FL (ref 6–12)
POTASSIUM SERPL-SCNC: 3.8 MMOL/L (ref 3.5–5.2)
PROT ?TM UR-MCNC: 36.3 MG/DL
PROT SERPL-MCNC: 7.5 G/DL (ref 6–8.5)
PROT UR QL STRIP: ABNORMAL
PROT/CREAT UR: 0.18 MG/G{CREAT}
PTH-INTACT SERPL-MCNC: 59.8 PG/ML (ref 15–65)
RBC # BLD AUTO: 4.28 10*6/MM3 (ref 4.14–5.8)
RBC # UR STRIP: ABNORMAL /HPF
REF LAB TEST METHOD: ABNORMAL
SODIUM SERPL-SCNC: 144 MMOL/L (ref 136–145)
SP GR UR STRIP: 1.02 (ref 1–1.03)
SQUAMOUS #/AREA URNS HPF: ABNORMAL /HPF
T4 FREE SERPL-MCNC: 1.51 NG/DL (ref 0.93–1.7)
TIBC SERPL-MCNC: 289 MCG/DL (ref 298–536)
TRANSFERRIN SERPL-MCNC: 194 MG/DL (ref 200–360)
TRIGL SERPL-MCNC: 86 MG/DL (ref 0–150)
TSH SERPL DL<=0.05 MIU/L-ACNC: 3.45 UIU/ML (ref 0.27–4.2)
UROBILINOGEN UR QL STRIP: ABNORMAL
VIT B12 BLD-MCNC: 371 PG/ML (ref 211–946)
VLDLC SERPL-MCNC: 16 MG/DL (ref 5–40)
WBC # UR STRIP: ABNORMAL /HPF
WBC NRBC COR # BLD: 8 10*3/MM3 (ref 3.4–10.8)

## 2023-05-23 PROCEDURE — 82728 ASSAY OF FERRITIN: CPT

## 2023-05-23 PROCEDURE — 36415 COLL VENOUS BLD VENIPUNCTURE: CPT

## 2023-05-23 PROCEDURE — 84466 ASSAY OF TRANSFERRIN: CPT

## 2023-05-23 PROCEDURE — 84156 ASSAY OF PROTEIN URINE: CPT

## 2023-05-23 PROCEDURE — 81001 URINALYSIS AUTO W/SCOPE: CPT

## 2023-05-23 PROCEDURE — 80053 COMPREHEN METABOLIC PANEL: CPT

## 2023-05-23 PROCEDURE — 85025 COMPLETE CBC W/AUTO DIFF WBC: CPT

## 2023-05-23 PROCEDURE — 82607 VITAMIN B-12: CPT

## 2023-05-23 PROCEDURE — 83036 HEMOGLOBIN GLYCOSYLATED A1C: CPT

## 2023-05-23 PROCEDURE — 83540 ASSAY OF IRON: CPT

## 2023-05-23 PROCEDURE — 84100 ASSAY OF PHOSPHORUS: CPT

## 2023-05-23 PROCEDURE — 82306 VITAMIN D 25 HYDROXY: CPT

## 2023-05-23 PROCEDURE — 80061 LIPID PANEL: CPT

## 2023-05-23 PROCEDURE — 82570 ASSAY OF URINE CREATININE: CPT

## 2023-05-23 PROCEDURE — 83970 ASSAY OF PARATHORMONE: CPT

## 2023-05-23 PROCEDURE — 82746 ASSAY OF FOLIC ACID SERUM: CPT

## 2023-05-23 PROCEDURE — 84443 ASSAY THYROID STIM HORMONE: CPT

## 2023-05-23 PROCEDURE — 84439 ASSAY OF FREE THYROXINE: CPT

## 2023-05-31 DIAGNOSIS — Z79.4 TYPE 2 DIABETES MELLITUS WITHOUT COMPLICATION, WITH LONG-TERM CURRENT USE OF INSULIN: ICD-10-CM

## 2023-05-31 DIAGNOSIS — N40.0 BENIGN PROSTATIC HYPERPLASIA WITHOUT LOWER URINARY TRACT SYMPTOMS: ICD-10-CM

## 2023-05-31 DIAGNOSIS — E11.9 TYPE 2 DIABETES MELLITUS WITHOUT COMPLICATION, WITH LONG-TERM CURRENT USE OF INSULIN: ICD-10-CM

## 2023-05-31 RX ORDER — LANCETS 28 GAUGE
1 EACH MISCELLANEOUS DAILY
Qty: 100 EACH | Refills: 12 | Status: SHIPPED | OUTPATIENT
Start: 2023-05-31

## 2023-05-31 RX ORDER — TAMSULOSIN HYDROCHLORIDE 0.4 MG/1
2 CAPSULE ORAL DAILY
Qty: 180 CAPSULE | Refills: 3 | Status: SHIPPED | OUTPATIENT
Start: 2023-05-31

## 2023-05-31 NOTE — TELEPHONE ENCOUNTER
Caller: MARI LOREDO    Relationship: Emergency Contact    Best call back number: [phone]    Requested Prescriptions:   Requested Prescriptions     Pending Prescriptions Disp Refills   • Lancets (freestyle) lancets 100 each 12     Si each by Other route Daily. Use as instructed   • glucose blood test strip 100 each 12     Si each by Other route Daily. Use as instructed   • tamsulosin (FLOMAX) 0.4 MG capsule 24 hr capsule 180 capsule 3     Sig: Take 2 capsules by mouth Daily.        Pharmacy where request should be sent: [facility] -  [place], [place] - [address] [facility] [phone] [facility] [phone] FX     Last office visit with prescribing clinician: 2023   Last telemedicine visit with prescribing clinician: Visit date not found   Next office visit with prescribing clinician: 2023     Does the patient have less than a 3 day supply:  [] Yes  [x] No    Would you like a call back once the refill request has been completed: [] Yes [x] No    If the office needs to give you a call back, can they leave a voicemail: [] Yes [x] No    Mitchell Garza Rep   23 15:24 EDT

## 2023-09-20 ENCOUNTER — TELEPHONE (OUTPATIENT)
Dept: UROLOGY | Facility: CLINIC | Age: 83
End: 2023-09-20
Payer: OTHER GOVERNMENT

## 2023-09-20 NOTE — TELEPHONE ENCOUNTER
CALLED PT TO RS CX APPT W/LIBERTY/PT CAN NOT RS AT THIS TIME DUE TO NO TRANSPORTATION/ANYTHING ELSE TO DO??

## 2023-11-28 ENCOUNTER — OFFICE VISIT (OUTPATIENT)
Dept: FAMILY MEDICINE CLINIC | Facility: CLINIC | Age: 83
End: 2023-11-28
Payer: MEDICARE

## 2023-11-28 VITALS
SYSTOLIC BLOOD PRESSURE: 130 MMHG | OXYGEN SATURATION: 96 % | BODY MASS INDEX: 31.49 KG/M2 | HEART RATE: 91 BPM | RESPIRATION RATE: 16 BRPM | WEIGHT: 212.6 LBS | DIASTOLIC BLOOD PRESSURE: 80 MMHG | HEIGHT: 69 IN | TEMPERATURE: 98.1 F

## 2023-11-28 DIAGNOSIS — E53.8 LOW FOLIC ACID: ICD-10-CM

## 2023-11-28 DIAGNOSIS — Z00.00 MEDICARE ANNUAL WELLNESS VISIT, SUBSEQUENT: Primary | ICD-10-CM

## 2023-11-28 DIAGNOSIS — E55.9 VITAMIN D DEFICIENCY: ICD-10-CM

## 2023-11-28 DIAGNOSIS — D50.8 IRON DEFICIENCY ANEMIA SECONDARY TO INADEQUATE DIETARY IRON INTAKE: ICD-10-CM

## 2023-11-28 DIAGNOSIS — Z23 NEED FOR VACCINATION: ICD-10-CM

## 2023-11-28 DIAGNOSIS — E78.2 MIXED HYPERLIPIDEMIA: ICD-10-CM

## 2023-11-28 DIAGNOSIS — E66.9 CLASS 1 OBESITY WITH SERIOUS COMORBIDITY AND BODY MASS INDEX (BMI) OF 31.0 TO 31.9 IN ADULT, UNSPECIFIED OBESITY TYPE: ICD-10-CM

## 2023-11-28 DIAGNOSIS — I10 ESSENTIAL HYPERTENSION: ICD-10-CM

## 2023-11-28 DIAGNOSIS — N40.0 BENIGN PROSTATIC HYPERPLASIA WITHOUT LOWER URINARY TRACT SYMPTOMS: ICD-10-CM

## 2023-11-28 DIAGNOSIS — E03.9 ACQUIRED HYPOTHYROIDISM: ICD-10-CM

## 2023-11-28 DIAGNOSIS — E11.9 TYPE 2 DIABETES MELLITUS WITHOUT COMPLICATION, WITH LONG-TERM CURRENT USE OF INSULIN: ICD-10-CM

## 2023-11-28 DIAGNOSIS — Z79.4 TYPE 2 DIABETES MELLITUS WITHOUT COMPLICATION, WITH LONG-TERM CURRENT USE OF INSULIN: ICD-10-CM

## 2023-11-28 DIAGNOSIS — Z23 NEED FOR INFLUENZA VACCINATION: ICD-10-CM

## 2023-11-28 DIAGNOSIS — D50.8 OTHER IRON DEFICIENCY ANEMIA: ICD-10-CM

## 2023-11-28 PROBLEM — E66.811 CLASS 1 OBESITY WITH SERIOUS COMORBIDITY AND BODY MASS INDEX (BMI) OF 31.0 TO 31.9 IN ADULT: Status: ACTIVE | Noted: 2023-11-28

## 2023-11-28 RX ORDER — AMLODIPINE BESYLATE 5 MG/1
5 TABLET ORAL DAILY
Qty: 90 TABLET | Refills: 1 | Status: CANCELLED | OUTPATIENT
Start: 2023-11-28

## 2023-11-28 RX ORDER — FERROUS SULFATE 325(65) MG
1 TABLET ORAL 2 TIMES DAILY
Qty: 180 TABLET | Refills: 1 | Status: SHIPPED | OUTPATIENT
Start: 2023-11-28

## 2023-11-28 RX ORDER — LEVOTHYROXINE SODIUM 0.03 MG/1
25 TABLET ORAL DAILY
Qty: 90 TABLET | Refills: 1 | Status: SHIPPED | OUTPATIENT
Start: 2023-11-28

## 2023-11-28 RX ORDER — LANOLIN ALCOHOL/MO/W.PET/CERES
0.4 CREAM (GRAM) TOPICAL DAILY
Qty: 90 TABLET | Refills: 1 | Status: SHIPPED | OUTPATIENT
Start: 2023-11-28

## 2023-11-28 RX ORDER — FINASTERIDE 5 MG/1
5 TABLET, FILM COATED ORAL DAILY
Qty: 90 TABLET | Refills: 1 | Status: SHIPPED | OUTPATIENT
Start: 2023-11-28

## 2023-11-28 RX ORDER — PEN NEEDLE, DIABETIC 29 G X1/2"
30 NEEDLE, DISPOSABLE MISCELLANEOUS DAILY
Qty: 1 EACH | Refills: 2 | Status: SHIPPED | OUTPATIENT
Start: 2023-11-28

## 2023-11-28 RX ORDER — PRAVASTATIN SODIUM 80 MG/1
80 TABLET ORAL NIGHTLY
Qty: 90 TABLET | Refills: 1 | Status: SHIPPED | OUTPATIENT
Start: 2023-11-28

## 2023-11-28 RX ORDER — INSULIN GLARGINE 100 [IU]/ML
28 INJECTION, SOLUTION SUBCUTANEOUS NIGHTLY
Qty: 30 ML | Refills: 1 | Status: SHIPPED | OUTPATIENT
Start: 2023-11-28

## 2023-11-28 NOTE — PROGRESS NOTES
The ABCs of the Annual Wellness Visit  Subsequent Medicare Wellness Visit    Subjective    Too Velasquez is a 83 y.o. male who presents for a Subsequent Medicare Wellness Visit.    The following portions of the patient's history were reviewed and   updated as appropriate: allergies, current medications, past family history, past medical history, past social history, past surgical history, and problem list.    Compared to one year ago, the patient feels his physical   health is the same.    Compared to one year ago, the patient feels his mental   health is the same.    Recent Hospitalizations:  He was not admitted to the hospital during the last year.       Current Medical Providers:  Patient Care Team:  Lanie Murillo APRN as PCP - General (Nurse Practitioner)    Outpatient Medications Prior to Visit   Medication Sig Dispense Refill    Alphagan P 0.1 % solution ophthalmic solution       apixaban (ELIQUIS) 2.5 MG tablet tablet Take 1 tablet by mouth 2 (Two) Times a Day.      brimonidine (ALPHAGAN) 0.15 % ophthalmic solution       cholecalciferol (VITAMIN D3) 25 MCG (1000 UT) tablet Take 1 tablet by mouth Daily.      dilTIAZem CD (CARDIZEM CD) 240 MG 24 hr capsule Take 1 capsule by mouth Daily.      dorzolamide-timolol (COSOPT) 22.3-6.8 MG/ML ophthalmic solution       Emollient (DermaPhor) ointment Apply 1 application topically Daily. 454 g 2    fluorometholone (FML) 0.1 % ophthalmic suspension       glucose blood test strip 1 each by Other route Daily. Use as instructed 100 each 12    Lancets (freestyle) lancets 1 each by Other route Daily. Use as instructed 100 each 12    lisinopril (PRINIVIL,ZESTRIL) 40 MG tablet Take 1 tablet by mouth.      Mirabegron ER (Myrbetriq) 25 MG tablet sustained-release 24 hour 24 hr tablet Take 1 tablet by mouth Daily. 90 tablet 3    Mirabegron ER (Myrbetriq) 25 MG tablet sustained-release 24 hour 24 hr tablet Take 1 tablet by mouth Daily. 56 tablet 0    Rhopressa 0.02 %  "solution       tamsulosin (FLOMAX) 0.4 MG capsule 24 hr capsule Take 2 capsules by mouth Daily. 180 capsule 3    amLODIPine (NORVASC) 5 MG tablet Take 1 tablet by mouth Daily. 90 tablet 1    BD Insulin Syringe U/F 31G X 5/16\" 0.5 ML misc Inject 30 Units as directed Daily. 1 each 2    FeroSul 325 (65 Fe) MG tablet Take 1 tablet by mouth 2 (Two) Times a Day. 180 tablet 1    finasteride (PROSCAR) 5 MG tablet Take 1 tablet by mouth Daily. 90 tablet 1    folic acid (FOLVITE) 400 MCG tablet Take 1 tablet by mouth Daily. 90 tablet 1    insulin glargine (Lantus) 100 UNIT/ML injection Inject 28 Units under the skin into the appropriate area as directed Every Night. 30 mL 1    levothyroxine (Synthroid) 25 MCG tablet Take 1 tablet by mouth Daily. 90 tablet 1    metFORMIN (GLUCOPHAGE) 500 MG tablet Take 1 tablet by mouth 2 (Two) Times a Day With Meals. 180 tablet 1    pravastatin (PRAVACHOL) 80 MG tablet Take 1 tablet by mouth Every Night. 90 tablet 1     No facility-administered medications prior to visit.       No opioid medication identified on active medication list. I have reviewed chart for other potential  high risk medication/s and harmful drug interactions in the elderly.        Aspirin is not on active medication list.  Aspirin use is not indicated based on review of current medical condition/s. Risk of harm outweighs potential benefits.  .    Patient Active Problem List   Diagnosis    Atrial fibrillation    Benign prostatic hyperplasia without lower urinary tract symptoms    Essential hypertension    Glaucoma    Hyperlipidemia    Insomnia    Stage 3 chronic kidney disease    Type 2 diabetes mellitus without complication    Vitamin D deficiency    Iron deficiency anemia secondary to inadequate dietary iron intake    Acquired hypothyroidism    Absolute anemia    Diabetes mellitus    Gout    Metabolic acidosis    Class 1 obesity with serious comorbidity and body mass index (BMI) of 31.0 to 31.9 in adult    Low folic " "acid     Advance Care Planning   Advance Care Planning     Advance Directive is not on file.  ACP discussion was declined by the patient. Patient does not have an advance directive, information provided.     Objective    Vitals:    23 0925   BP: 130/80   Pulse: 91   Resp: 16   Temp: 98.1 °F (36.7 °C)   SpO2: 96%   Weight: 96.4 kg (212 lb 9.6 oz)   Height: 175.3 cm (69\")     Estimated body mass index is 31.4 kg/m² as calculated from the following:    Height as of this encounter: 175.3 cm (69\").    Weight as of this encounter: 96.4 kg (212 lb 9.6 oz).    BMI is >= 30 and <35. (Class 1 Obesity). The following options were offered after discussion;: exercise counseling/recommendations and nutrition counseling/recommendations      Does the patient have evidence of cognitive impairment? No          HEALTH RISK ASSESSMENT    Smoking Status:  Social History     Tobacco Use   Smoking Status Never   Smokeless Tobacco Never     Alcohol Consumption:  Social History     Substance and Sexual Activity   Alcohol Use Never     Fall Risk Screen:    STEADI Fall Risk Assessment was completed, and patient is at LOW risk for falls.Assessment completed on:2023    Depression Screenin/28/2023     9:30 AM   PHQ-2/PHQ-9 Depression Screening   Little Interest or Pleasure in Doing Things 0-->not at all   Feeling Down, Depressed or Hopeless 0-->not at all   PHQ-9: Brief Depression Severity Measure Score 0       Health Habits and Functional and Cognitive Screenin/28/2023     9:30 AM   Functional & Cognitive Status   Do you have difficulty preparing food and eating? No   Do you have difficulty bathing yourself, getting dressed or grooming yourself? No   Do you have difficulty using the toilet? No   Do you have difficulty moving around from place to place? Yes   Do you have trouble with steps or getting out of a bed or a chair? No   Current Diet Well Balanced Diet   Dental Exam Not up to date   Eye Exam Up to date    "     Eye Exam Comment siu and irving   Exercise (times per week) 0 times per week   Current Exercises Include No Regular Exercise   Do you need help using the phone?  No   Are you deaf or do you have serious difficulty hearing?  No   Do you need help to go to places out of walking distance? Yes   Do you need help shopping? No   Do you need help preparing meals?  No   Do you need help with housework?  No   Do you need help with laundry? No   Do you need help taking your medications? No   Do you need help managing money? No   Do you ever drive or ride in a car without wearing a seat belt? No   Have you felt unusual stress, anger or loneliness in the last month? No   Who do you live with? Spouse   If you need help, do you have trouble finding someone available to you? No   Have you been bothered in the last four weeks by sexual problems? No   Do you have difficulty concentrating, remembering or making decisions? Yes       Age-appropriate Screening Schedule:  Refer to the list below for future screening recommendations based on patient's age, sex and/or medical conditions. Orders for these recommended tests are listed in the plan section. The patient has been provided with a written plan.    Health Maintenance   Topic Date Due    ZOSTER VACCINE (1 of 2) Never done    DIABETIC EYE EXAM  03/10/2022    BMI FOLLOWUP  11/22/2023    HEMOGLOBIN A1C  11/23/2023    TDAP/TD VACCINES (1 - Tdap) 11/28/2023 (Originally 7/17/1959)    COVID-19 Vaccine (7 - 2023-24 season) 02/17/2024 (Originally 9/1/2023)    LIPID PANEL  05/23/2024    URINE MICROALBUMIN  05/23/2024    ANNUAL WELLNESS VISIT  11/28/2024    INFLUENZA VACCINE  Completed    Pneumococcal Vaccine 65+  Completed                  CMS Preventative Services Quick Reference  Risk Factors Identified During Encounter  Fall Risk-High or Moderate: Discussed Fall Prevention in the home  Immunizations Discussed/Encouraged: Tdap, Prevnar 20 (Pneumococcal 20-valent conjugate), Shingrix,  COVID19, and RSV (Respiratory Syncytial Virus)  Dental Screening Recommended  Vision Screening Recommended  The above risks/problems have been discussed with the patient.  Pertinent information has been shared with the patient in the After Visit Summary.  An After Visit Summary and PPPS were made available to the patient.    Follow Up:   Next Medicare Wellness visit to be scheduled in 1 year.       Additional E&M Note during same encounter follows:  Patient has multiple medical problems which are significant and separately identifiable that require additional work above and beyond the Medicare Wellness Visit.      Chief Complaint  Diabetes, Hyperlipidemia, Hypertension, Hypothyroidism, and Medicare Wellness-subsequent    Subjective        HPI  Too Velasquez is also being seen today for   He is here with his wife today  HTN; he is taking his Norvasc/lisinopril daily.  No chest pain shortness of breath noted.  LIPID: He is taking his pravastatin with no leg pain.  DM: He has been checking his sugars daily.  He is on insulin currently.  He is not having anything higher than 114 in the mornings.    BPH: He is taking his Flomax without any issues regarding his prostate but he still does have some frequency.  Urology has him currently on Flomax and they also wanted him on finasteride. He he takes 2 flomax he gets a little nauseated but he does take them on an empty stomach.  ANEMIA: He is taking his folic acid and his iron daily no blood in the stool that he can see.  THYROID: He is taking his Synthroid daily at 25 mcg he cannot tell any difference.  CKD: Kidneys are stable by last set of labs but they did raise his BP meds to 40mg lisinopril.       Review of Systems   Constitutional:  Positive for fatigue.   Respiratory:  Negative for cough and shortness of breath.    Cardiovascular:  Negative for chest pain and leg swelling.   Gastrointestinal:  Negative for nausea and vomiting.   Psychiatric/Behavioral:  Negative for  "hallucinations and suicidal ideas.        Objective   Vital Signs:  /80   Pulse 91   Temp 98.1 °F (36.7 °C)   Resp 16   Ht 175.3 cm (69\")   Wt 96.4 kg (212 lb 9.6 oz)   SpO2 96%   BMI 31.40 kg/m²     Physical Exam  Vitals reviewed.   Constitutional:       Appearance: Normal appearance. He is well-developed.   Cardiovascular:      Rate and Rhythm: Normal rate and regular rhythm.      Heart sounds: Normal heart sounds. No murmur heard.  Pulmonary:      Effort: Pulmonary effort is normal.      Breath sounds: Normal breath sounds.   Neurological:      Mental Status: He is alert and oriented to person, place, and time.      Cranial Nerves: No cranial nerve deficit.      Motor: No weakness.   Psychiatric:         Mood and Affect: Mood and affect normal.          The following data was reviewed by: BRIAN Arizmendi on 11/28/2023:  Common labs          12/6/2022    12:57 5/17/2023    10:24 5/23/2023    12:12   Common Labs   Glucose 118   71    BUN 26   22    Creatinine 2.06   1.86    Sodium 142   144    Potassium 4.0   3.8    Chloride 108   110    Calcium 8.8   9.3    Albumin   4.2    Total Bilirubin   0.3    Alkaline Phosphatase   76    AST (SGOT)   14    ALT (SGPT)   13    WBC 8.16   8.00    Hemoglobin 10.6   12.0    Hematocrit 33.2   37.1    Platelets 204   213    Total Cholesterol   141    Triglycerides   86    HDL Cholesterol   45    LDL Cholesterol    80    Hemoglobin A1C   7.10    Microalbumin, Urine  8.3                  Assessment and Plan   Diagnoses and all orders for this visit:    1. Medicare annual wellness visit, subsequent (Primary)    2. Need for vaccination  -     Pneumococcal Conjugate Vaccine 20-Valent All    3. Mixed hyperlipidemia  -     CBC & Differential; Future  -     Comprehensive Metabolic Panel; Future  -     TSH; Future  -     Lipid Panel; Future  -     pravastatin (PRAVACHOL) 80 MG tablet; Take 1 tablet by mouth Every Night.  Dispense: 90 tablet; Refill: 1    4. Type 2 " "diabetes mellitus without complication, with long-term current use of insulin  -     CBC & Differential; Future  -     Comprehensive Metabolic Panel; Future  -     TSH; Future  -     Lipid Panel; Future  -     Hemoglobin A1c; Future  -     MicroAlbumin, Urine, Random - Urine, Clean Catch; Future  -     metFORMIN (GLUCOPHAGE) 500 MG tablet; Take 1 tablet by mouth 2 (Two) Times a Day With Meals.  Dispense: 180 tablet; Refill: 1  -     insulin glargine (Lantus) 100 UNIT/ML injection; Inject 28 Units under the skin into the appropriate area as directed Every Night.  Dispense: 30 mL; Refill: 1  -     BD Insulin Syringe U/F 31G X 5/16\" 0.5 ML misc; Inject 30 Units as directed Daily.  Dispense: 1 each; Refill: 2    5. Acquired hypothyroidism  -     TSH; Future  -     levothyroxine (Synthroid) 25 MCG tablet; Take 1 tablet by mouth Daily.  Dispense: 90 tablet; Refill: 1  -     T4, Free; Future    6. Other iron deficiency anemia  -     folic acid (FOLVITE) 400 MCG tablet; Take 1 tablet by mouth Daily.  Dispense: 90 tablet; Refill: 1  -     FeroSul 325 (65 Fe) MG tablet; Take 1 tablet by mouth 2 (Two) Times a Day.  Dispense: 180 tablet; Refill: 1    7. Benign prostatic hyperplasia without lower urinary tract symptoms  -     finasteride (PROSCAR) 5 MG tablet; Take 1 tablet by mouth Daily.  Dispense: 90 tablet; Refill: 1    8. Essential hypertension  -     CBC & Differential; Future  -     Comprehensive Metabolic Panel; Future  -     TSH; Future  -     Lipid Panel; Future    9. Vitamin D deficiency  -     Vitamin D,25-Hydroxy; Future    10. Iron deficiency anemia secondary to inadequate dietary iron intake  -     Iron Profile; Future    11. Low folic acid  -     Vitamin B12 & Folate; Future    12. Need for influenza vaccination  -     Fluzone High-Dose 65+yrs    13. Class 1 obesity with serious comorbidity and body mass index (BMI) of 31.0 to 31.9 in adult, unspecified obesity type             Follow Up   Return in about 6 " months (around 5/28/2024).  Patient was given instructions and counseling regarding his condition or for health maintenance advice. Please see specific information pulled into the AVS if appropriate.   Follow-up in 6 months for labs and appt. Call with any concerns or questions that you may have regarding your medications or history.    He will take flomax at supper time. I have reviewed all medications and at this time no medications changes need to be adjusted for all chronic conditions.    Lanie Murillo, APRN  11/28/2023

## 2023-11-29 ENCOUNTER — LAB (OUTPATIENT)
Dept: LAB | Facility: HOSPITAL | Age: 83
End: 2023-11-29
Payer: MEDICARE

## 2023-11-29 ENCOUNTER — TRANSCRIBE ORDERS (OUTPATIENT)
Dept: LAB | Facility: HOSPITAL | Age: 83
End: 2023-11-29
Payer: OTHER GOVERNMENT

## 2023-11-29 DIAGNOSIS — Z79.4 TYPE 2 DIABETES MELLITUS WITHOUT COMPLICATION, WITH LONG-TERM CURRENT USE OF INSULIN: ICD-10-CM

## 2023-11-29 DIAGNOSIS — E55.9 VITAMIN D DEFICIENCY: ICD-10-CM

## 2023-11-29 DIAGNOSIS — E11.9 TYPE 2 DIABETES MELLITUS WITHOUT COMPLICATION, WITH LONG-TERM CURRENT USE OF INSULIN: ICD-10-CM

## 2023-11-29 DIAGNOSIS — N18.30 STAGE 3 CHRONIC KIDNEY DISEASE, UNSPECIFIED WHETHER STAGE 3A OR 3B CKD: Primary | ICD-10-CM

## 2023-11-29 DIAGNOSIS — D50.8 IRON DEFICIENCY ANEMIA SECONDARY TO INADEQUATE DIETARY IRON INTAKE: ICD-10-CM

## 2023-11-29 DIAGNOSIS — E53.8 LOW FOLIC ACID: ICD-10-CM

## 2023-11-29 DIAGNOSIS — N18.30 STAGE 3 CHRONIC KIDNEY DISEASE, UNSPECIFIED WHETHER STAGE 3A OR 3B CKD: ICD-10-CM

## 2023-11-29 DIAGNOSIS — E78.2 MIXED HYPERLIPIDEMIA: ICD-10-CM

## 2023-11-29 DIAGNOSIS — I10 ESSENTIAL HYPERTENSION: ICD-10-CM

## 2023-11-29 DIAGNOSIS — E03.9 ACQUIRED HYPOTHYROIDISM: ICD-10-CM

## 2023-11-29 LAB
25(OH)D3 SERPL-MCNC: 51.1 NG/ML (ref 30–100)
ALBUMIN SERPL-MCNC: 4.4 G/DL (ref 3.5–5.2)
ALBUMIN UR-MCNC: 8.6 MG/DL
ALBUMIN/GLOB SERPL: 1.5 G/DL
ALP SERPL-CCNC: 80 U/L (ref 39–117)
ALT SERPL W P-5'-P-CCNC: 12 U/L (ref 1–41)
ANION GAP SERPL CALCULATED.3IONS-SCNC: 14 MMOL/L (ref 5–15)
AST SERPL-CCNC: 12 U/L (ref 1–40)
BASOPHILS # BLD AUTO: 0.07 10*3/MM3 (ref 0–0.2)
BASOPHILS NFR BLD AUTO: 1.3 % (ref 0–1.5)
BILIRUB SERPL-MCNC: 0.3 MG/DL (ref 0–1.2)
BUN SERPL-MCNC: 24 MG/DL (ref 8–23)
BUN/CREAT SERPL: 12 (ref 7–25)
CALCIUM SPEC-SCNC: 9.3 MG/DL (ref 8.6–10.5)
CHLORIDE SERPL-SCNC: 106 MMOL/L (ref 98–107)
CHOLEST SERPL-MCNC: 129 MG/DL (ref 0–200)
CO2 SERPL-SCNC: 20 MMOL/L (ref 22–29)
CREAT SERPL-MCNC: 2 MG/DL (ref 0.76–1.27)
CREAT UR-MCNC: 117.3 MG/DL
DEPRECATED RDW RBC AUTO: 41.4 FL (ref 37–54)
EGFRCR SERPLBLD CKD-EPI 2021: 32.1 ML/MIN/1.73
EOSINOPHIL # BLD AUTO: 0.09 10*3/MM3 (ref 0–0.4)
EOSINOPHIL NFR BLD AUTO: 1.6 % (ref 0.3–6.2)
ERYTHROCYTE [DISTWIDTH] IN BLOOD BY AUTOMATED COUNT: 13.5 % (ref 12.3–15.4)
FOLATE SERPL-MCNC: 17.6 NG/ML (ref 4.78–24.2)
GLOBULIN UR ELPH-MCNC: 2.9 GM/DL
GLUCOSE SERPL-MCNC: 80 MG/DL (ref 65–99)
HBA1C MFR BLD: 7.5 % (ref 4.8–5.6)
HCT VFR BLD AUTO: 36.4 % (ref 37.5–51)
HDLC SERPL-MCNC: 41 MG/DL (ref 40–60)
HGB BLD-MCNC: 11.3 G/DL (ref 13–17.7)
IMM GRANULOCYTES # BLD AUTO: 0.01 10*3/MM3 (ref 0–0.05)
IMM GRANULOCYTES NFR BLD AUTO: 0.2 % (ref 0–0.5)
IRON 24H UR-MRATE: 42 MCG/DL (ref 59–158)
IRON SATN MFR SERPL: 16 % (ref 20–50)
LDLC SERPL CALC-MCNC: 71 MG/DL (ref 0–100)
LDLC/HDLC SERPL: 1.71 {RATIO}
LYMPHOCYTES # BLD AUTO: 1.22 10*3/MM3 (ref 0.7–3.1)
LYMPHOCYTES NFR BLD AUTO: 22.2 % (ref 19.6–45.3)
MCH RBC QN AUTO: 26.2 PG (ref 26.6–33)
MCHC RBC AUTO-ENTMCNC: 31 G/DL (ref 31.5–35.7)
MCV RBC AUTO: 84.5 FL (ref 79–97)
MONOCYTES # BLD AUTO: 0.53 10*3/MM3 (ref 0.1–0.9)
MONOCYTES NFR BLD AUTO: 9.7 % (ref 5–12)
NEUTROPHILS NFR BLD AUTO: 3.57 10*3/MM3 (ref 1.7–7)
NEUTROPHILS NFR BLD AUTO: 65 % (ref 42.7–76)
NRBC BLD AUTO-RTO: 0 /100 WBC (ref 0–0.2)
PHOSPHATE SERPL-MCNC: 2.8 MG/DL (ref 2.5–4.5)
PLATELET # BLD AUTO: 214 10*3/MM3 (ref 140–450)
PMV BLD AUTO: 11.1 FL (ref 6–12)
POTASSIUM SERPL-SCNC: 3.8 MMOL/L (ref 3.5–5.2)
PROT ?TM UR-MCNC: 28.3 MG/DL
PROT SERPL-MCNC: 7.3 G/DL (ref 6–8.5)
PROT/CREAT UR: 0.24 MG/G{CREAT}
PTH-INTACT SERPL-MCNC: 60.1 PG/ML (ref 15–65)
RBC # BLD AUTO: 4.31 10*6/MM3 (ref 4.14–5.8)
SODIUM SERPL-SCNC: 140 MMOL/L (ref 136–145)
T4 FREE SERPL-MCNC: 1.38 NG/DL (ref 0.93–1.7)
TIBC SERPL-MCNC: 268 MCG/DL (ref 298–536)
TRANSFERRIN SERPL-MCNC: 180 MG/DL (ref 200–360)
TRIGL SERPL-MCNC: 90 MG/DL (ref 0–150)
TSH SERPL DL<=0.05 MIU/L-ACNC: 4.5 UIU/ML (ref 0.27–4.2)
VIT B12 BLD-MCNC: 348 PG/ML (ref 211–946)
VLDLC SERPL-MCNC: 17 MG/DL (ref 5–40)
WBC NRBC COR # BLD AUTO: 5.49 10*3/MM3 (ref 3.4–10.8)

## 2023-11-29 PROCEDURE — 83540 ASSAY OF IRON: CPT

## 2023-11-29 PROCEDURE — 80061 LIPID PANEL: CPT

## 2023-11-29 PROCEDURE — 80053 COMPREHEN METABOLIC PANEL: CPT

## 2023-11-29 PROCEDURE — 84439 ASSAY OF FREE THYROXINE: CPT

## 2023-11-29 PROCEDURE — 82607 VITAMIN B-12: CPT

## 2023-11-29 PROCEDURE — 82570 ASSAY OF URINE CREATININE: CPT

## 2023-11-29 PROCEDURE — 84466 ASSAY OF TRANSFERRIN: CPT

## 2023-11-29 PROCEDURE — 83970 ASSAY OF PARATHORMONE: CPT

## 2023-11-29 PROCEDURE — 82746 ASSAY OF FOLIC ACID SERUM: CPT

## 2023-11-29 PROCEDURE — 84156 ASSAY OF PROTEIN URINE: CPT

## 2023-11-29 PROCEDURE — 84100 ASSAY OF PHOSPHORUS: CPT

## 2023-11-29 PROCEDURE — 84443 ASSAY THYROID STIM HORMONE: CPT

## 2023-11-29 PROCEDURE — 81001 URINALYSIS AUTO W/SCOPE: CPT

## 2023-11-29 PROCEDURE — 85025 COMPLETE CBC W/AUTO DIFF WBC: CPT

## 2023-11-29 PROCEDURE — 82043 UR ALBUMIN QUANTITATIVE: CPT

## 2023-11-29 PROCEDURE — 83036 HEMOGLOBIN GLYCOSYLATED A1C: CPT

## 2023-11-29 PROCEDURE — 82306 VITAMIN D 25 HYDROXY: CPT

## 2023-11-29 PROCEDURE — 36415 COLL VENOUS BLD VENIPUNCTURE: CPT

## 2023-11-30 LAB
BACTERIA UR QL AUTO: ABNORMAL /HPF
BILIRUB UR QL STRIP: NEGATIVE
CLARITY UR: CLEAR
COLOR UR: YELLOW
GLUCOSE UR STRIP-MCNC: NEGATIVE MG/DL
HGB UR QL STRIP.AUTO: NEGATIVE
HYALINE CASTS UR QL AUTO: ABNORMAL /LPF
KETONES UR QL STRIP: NEGATIVE
LEUKOCYTE ESTERASE UR QL STRIP.AUTO: ABNORMAL
NITRITE UR QL STRIP: NEGATIVE
PH UR STRIP.AUTO: 5.5 [PH] (ref 5–8)
PROT UR QL STRIP: ABNORMAL
RBC # UR STRIP: ABNORMAL /HPF
REF LAB TEST METHOD: ABNORMAL
SP GR UR STRIP: 1.02 (ref 1–1.03)
SQUAMOUS #/AREA URNS HPF: ABNORMAL /HPF
UROBILINOGEN UR QL STRIP: ABNORMAL
WBC # UR STRIP: ABNORMAL /HPF

## 2024-02-21 ENCOUNTER — TELEPHONE (OUTPATIENT)
Dept: FAMILY MEDICINE CLINIC | Facility: CLINIC | Age: 84
End: 2024-02-21
Payer: MEDICARE

## 2024-02-21 DIAGNOSIS — E11.9 TYPE 2 DIABETES MELLITUS WITHOUT COMPLICATION, WITH LONG-TERM CURRENT USE OF INSULIN: Primary | ICD-10-CM

## 2024-02-21 DIAGNOSIS — Z79.4 TYPE 2 DIABETES MELLITUS WITHOUT COMPLICATION, WITH LONG-TERM CURRENT USE OF INSULIN: Primary | ICD-10-CM

## 2024-02-21 NOTE — TELEPHONE ENCOUNTER
Caller: MARI LOREDO    Relationship: Emergency Contact    Best call back number: [phone]     What was the call regarding: MARI STATES SHE WOULD LIKE TO SPEAK WITH RASHAD WILEY BECAUSE THE PATIENTS BLOOD SUGAR IS LOW IN THE MORNING AND HE IS TIRED ALL OF THE TIME. MARI STATES SHE BELIEVES THE PATIENTS MEDICATION DOSAGE NEEDS TO BE DECREASED. MARI STATES THE PATIENT IS ALSO OUT OF BREATH FREQUENTLY WHEN MOVING AROUND AND SHE WOULD LIKE TO KNOW WHAT TO DO.

## 2024-02-22 NOTE — TELEPHONE ENCOUNTER
Spoke with patient wife patient taking  Metformin 500mg  twice a day. Insulin  30 units  at night.  He will have labs done [place].

## 2024-02-26 ENCOUNTER — LAB (OUTPATIENT)
Dept: LAB | Facility: HOSPITAL | Age: 84
End: 2024-02-26
Payer: MEDICARE

## 2024-02-26 DIAGNOSIS — E11.9 TYPE 2 DIABETES MELLITUS WITHOUT COMPLICATION, WITH LONG-TERM CURRENT USE OF INSULIN: ICD-10-CM

## 2024-02-26 DIAGNOSIS — Z79.4 TYPE 2 DIABETES MELLITUS WITHOUT COMPLICATION, WITH LONG-TERM CURRENT USE OF INSULIN: ICD-10-CM

## 2024-02-26 LAB — HBA1C MFR BLD: 6.6 % (ref 4.8–5.6)

## 2024-02-26 PROCEDURE — 36415 COLL VENOUS BLD VENIPUNCTURE: CPT

## 2024-02-26 PROCEDURE — 83036 HEMOGLOBIN GLYCOSYLATED A1C: CPT

## 2024-02-28 ENCOUNTER — OFFICE VISIT (OUTPATIENT)
Dept: FAMILY MEDICINE CLINIC | Facility: CLINIC | Age: 84
End: 2024-02-28
Payer: MEDICARE

## 2024-02-28 VITALS
OXYGEN SATURATION: 99 % | DIASTOLIC BLOOD PRESSURE: 80 MMHG | HEART RATE: 68 BPM | TEMPERATURE: 97.8 F | SYSTOLIC BLOOD PRESSURE: 130 MMHG | RESPIRATION RATE: 16 BRPM

## 2024-02-28 DIAGNOSIS — R06.02 SHORTNESS OF BREATH: ICD-10-CM

## 2024-02-28 DIAGNOSIS — R05.1 ACUTE COUGH: ICD-10-CM

## 2024-02-28 DIAGNOSIS — R53.1 WEAKNESS: Primary | ICD-10-CM

## 2024-02-28 DIAGNOSIS — Z79.4 TYPE 2 DIABETES MELLITUS WITHOUT COMPLICATION, WITH LONG-TERM CURRENT USE OF INSULIN: ICD-10-CM

## 2024-02-28 DIAGNOSIS — E11.9 TYPE 2 DIABETES MELLITUS WITHOUT COMPLICATION, WITH LONG-TERM CURRENT USE OF INSULIN: ICD-10-CM

## 2024-02-28 LAB
EXPIRATION DATE: NORMAL
FLUAV AG UPPER RESP QL IA.RAPID: NOT DETECTED
FLUAV SUBTYP SPEC NAA+PROBE: NOT DETECTED
FLUBV AG UPPER RESP QL IA.RAPID: NOT DETECTED
FLUBV RNA ISLT QL NAA+PROBE: NOT DETECTED
INTERNAL CONTROL: NORMAL
Lab: NORMAL
RSV RNA NPH QL NAA+NON-PROBE: NOT DETECTED
SARS-COV-2 AG UPPER RESP QL IA.RAPID: NOT DETECTED
SARS-COV-2 RNA RESP QL NAA+PROBE: NOT DETECTED

## 2024-02-28 PROCEDURE — 87637 SARSCOV2&INF A&B&RSV AMP PRB: CPT | Performed by: NURSE PRACTITIONER

## 2024-02-28 RX ORDER — ACETAZOLAMIDE 250 MG/1
250 TABLET ORAL EVERY 12 HOURS SCHEDULED
COMMUNITY
Start: 2024-02-13

## 2024-02-28 NOTE — PROGRESS NOTES
Chief Complaint  Sore Throat, Shortness of Breath, and Fatigue    Subjective          Too Velasquez presents to [facility]  History of Present Illness  He started getting sick a few days ago.  His sugar has been staying down.  We did cut the insulin down.  He is having SOA and tired.  He is walking with a walker today.  He is here with his wife.  He is eating and drinking just the amt has slowed down.    He has not had a fever but just weak.  He is having swelling on the left leg at times.  No cp/dizziness and no falls recently.      Depression: Not at risk (11/28/2023)    PHQ-2     PHQ-2 Score: 0    and 11/28/2023               Allergies  Penicillins    Social History     Tobacco Use    Smoking status: Never    Smokeless tobacco: Never   Vaping Use    Vaping Use: Never used   Substance Use Topics    Alcohol use: Never    Drug use: Never       Family History   Problem Relation Age of Onset    Heart disease Mother     Diabetes Mother         Unspecified    Heart disease Father     Diabetes Sister         Unspecified        There are no preventive care reminders to display for this patient.     Immunization History   Administered Date(s) Administered    COVID-19 (MODERNA) 1st,2nd,3rd Dose Monovalent 01/29/2021, 02/26/2021, 11/01/2021, 01/07/2022, 11/21/2022    COVID-19 (MODERNA) BIVALENT 12+YRS 11/21/2022    COVID-19 (MODERNA) Monovalent Original Booster 01/07/2022    COVID-19 (PFIZER) Purple Cap Monovalent 11/15/2023    COVID-19 F23 (MODERNA) 12YRS+ (SPIKEVAX) 12/08/2023    Flu Vaccine Quad PF >36MO 10/01/2020    Fluzone (or Fluarix & Flulaval for VFC) >6mos 12/24/2019, 10/09/2020    Fluzone High-Dose 65+yrs 12/02/2021, 11/22/2022, 11/28/2023    Influenza Seasonal Injectable 11/15/2023    Influenza, Unspecified 11/01/2013, 10/01/2020, 12/02/2021    PEDS-Pneumococcal Conjugate (PCV7) 11/15/2023    Pneumococcal Conjugate 13-Valent (PCV13) 12/05/2017, 10/20/2018    Pneumococcal  Conjugate 20-Valent (PCV20) 11/28/2023    Pneumococcal Polysaccharide (PPSV23) 09/24/2018    Td, Unspecified 06/01/2011       Review of Systems   Constitutional:  Positive for fatigue. Negative for chills and fever.   HENT:  Positive for congestion, postnasal drip, rhinorrhea, sinus pressure and sore throat.    Respiratory:  Positive for cough. Negative for shortness of breath.    Gastrointestinal:  Negative for diarrhea, nausea and vomiting.   Skin:  Negative for rash.        Objective       Vitals:    02/28/24 1406   BP: 130/80   Pulse: 68   Resp: 16   Temp: 97.8 °F (36.6 °C)   SpO2: 99%       There is no height or weight on file to calculate BMI.         Physical Exam  Vitals reviewed.   Constitutional:       Appearance: Normal appearance. He is well-developed.   HENT:      Right Ear: Tympanic membrane and ear canal normal. There is no impacted cerumen.      Left Ear: Tympanic membrane and ear canal normal. There is no impacted cerumen.      Nose: Congestion and rhinorrhea present.      Mouth/Throat:      Pharynx: No oropharyngeal exudate.   Eyes:      General: No scleral icterus.        Right eye: No discharge.         Left eye: No discharge.      Conjunctiva/sclera: Conjunctivae normal.   Cardiovascular:      Rate and Rhythm: Normal rate and regular rhythm.      Heart sounds: Normal heart sounds. No murmur heard.  Pulmonary:      Effort: Pulmonary effort is normal.      Breath sounds: Normal breath sounds. No wheezing or rhonchi.   Musculoskeletal:      Right lower leg: No edema.      Left lower leg: Edema present.   Neurological:      Mental Status: He is alert and oriented to person, place, and time.      Cranial Nerves: No cranial nerve deficit.      Motor: Weakness present.   Psychiatric:         Mood and Affect: Mood and affect normal.             Result Review :     The following data was reviewed by: BRIAN Arizmendi on 02/28/2024:    Common Labs   Common labs          5/23/2023    12:12  11/29/2023    11:32 11/29/2023    11:36 2/26/2024    11:50   Common Labs   Glucose 71  80      BUN 22  24      Creatinine 1.86  2.00      Sodium 144  140      Potassium 3.8  3.8      Chloride 110  106      Calcium 9.3  9.3      Albumin 4.2  4.4      Total Bilirubin 0.3  0.3      Alkaline Phosphatase 76  80      AST (SGOT) 14  12      ALT (SGPT) 13  12      WBC 8.00  5.49      Hemoglobin 12.0  11.3      Hematocrit 37.1  36.4      Platelets 213  214      Total Cholesterol 141  129      Triglycerides 86  90      HDL Cholesterol 45  41      LDL Cholesterol  80  71      Hemoglobin A1C 7.10  7.50   6.60    Microalbumin, Urine   8.6       A1C   Most Recent A1C          2/26/2024    11:50   HGBA1C Most Recent   Hemoglobin A1C 6.60      POC COVID/FLU   Lab Results   Component Value Date    SARSANTIGEN Not Detected 02/28/2024    FLUAAG Not Detected 02/28/2024    FLUBAG Not Detected 02/28/2024    INTCT Passed 02/28/2024    LOTNUMBER [id number] 02/28/2024    EXPIRATDTE 01/16/2025 02/28/2024                     Assessment and Plan      Diagnoses and all orders for this visit:    1. Weakness (Primary)  -     POCT SARS-CoV-2 Antigen KENDRICK + Flu  -     XR Chest PA & Lateral; Future  -     BNP; Future  -     CBC Auto Differential; Future  -     Comprehensive Metabolic Panel; Future  -     Urinalysis With Microscopic - Urine, Clean Catch; Future  -     COVID PRE-OP / PRE-PROCEDURE SCREENING ORDER (NO ISOLATION) - Swab, Oropharynx    2. Acute cough  -     XR Chest PA & Lateral; Future  -     BNP; Future  -     CBC Auto Differential; Future  -     Comprehensive Metabolic Panel; Future  -     Urinalysis With Microscopic - Urine, Clean Catch; Future  -     COVID PRE-OP / PRE-PROCEDURE SCREENING ORDER (NO ISOLATION) - Swab, Oropharynx    3. Shortness of breath  -     BNP; Future  -     COVID PRE-OP / PRE-PROCEDURE SCREENING ORDER (NO ISOLATION) - Swab, Oropharynx    4. Type 2 diabetes mellitus without complication, with long-term current use  of insulin            Follow Up     Return if symptoms worsen or fail to improve.  We will do the above testing but his wife wants to wait until tomorrow for the testing.  Push fluids.  To the ER if s/s worsen.  Discussed that we may need to add water pill.  We will follow up with labs.  Discussed aout his sugar and we did decrease insulin to 20 units.    Patient was given instructions and counseling regarding his condition or for health maintenance advice. Please see specific information pulled into the AVS if appropriate.     Parts of this note are electronic transcriptions/translations of spoken language to printed text using the Dragon Dictation system.          Lanie Murillo, APRN  02/28/2024

## 2024-02-29 ENCOUNTER — HOSPITAL ENCOUNTER (OUTPATIENT)
Dept: GENERAL RADIOLOGY | Facility: HOSPITAL | Age: 84
Discharge: HOME | End: 2024-02-29
Payer: MEDICARE

## 2024-02-29 ENCOUNTER — LAB (OUTPATIENT)
Dept: LAB | Facility: HOSPITAL | Age: 84
End: 2024-02-29
Payer: MEDICARE

## 2024-02-29 DIAGNOSIS — R53.1 WEAKNESS: ICD-10-CM

## 2024-02-29 DIAGNOSIS — R05.1 ACUTE COUGH: ICD-10-CM

## 2024-02-29 DIAGNOSIS — R06.02 SHORTNESS OF BREATH: ICD-10-CM

## 2024-02-29 LAB
ALBUMIN SERPL-MCNC: 4 G/DL (ref 3.5–5.2)
ALBUMIN/GLOB SERPL: 1.4 G/DL
ALP SERPL-CCNC: 73 U/L (ref 39–117)
ALT SERPL W P-5'-P-CCNC: 7 U/L (ref 1–41)
ANION GAP SERPL CALCULATED.3IONS-SCNC: 12 MMOL/L (ref 5–15)
AST SERPL-CCNC: 8 U/L (ref 1–40)
BACTERIA UR QL AUTO: ABNORMAL /HPF
BASOPHILS # BLD AUTO: 0.05 10*3/MM3 (ref 0–0.2)
BASOPHILS NFR BLD AUTO: 0.7 % (ref 0–1.5)
BILIRUB SERPL-MCNC: 0.2 MG/DL (ref 0–1.2)
BILIRUB UR QL STRIP: NEGATIVE
BUN SERPL-MCNC: 36 MG/DL (ref 8–23)
BUN/CREAT SERPL: 15.2 (ref 7–25)
CALCIUM SPEC-SCNC: 8.8 MG/DL (ref 8.6–10.5)
CHLORIDE SERPL-SCNC: 114 MMOL/L (ref 98–107)
CLARITY UR: CLEAR
CO2 SERPL-SCNC: 16 MMOL/L (ref 22–29)
COLOR UR: YELLOW
CREAT SERPL-MCNC: 2.37 MG/DL (ref 0.76–1.27)
DEPRECATED RDW RBC AUTO: 44.5 FL (ref 37–54)
EGFRCR SERPLBLD CKD-EPI 2021: 26.2 ML/MIN/1.73
EOSINOPHIL # BLD AUTO: 0.1 10*3/MM3 (ref 0–0.4)
EOSINOPHIL NFR BLD AUTO: 1.3 % (ref 0.3–6.2)
ERYTHROCYTE [DISTWIDTH] IN BLOOD BY AUTOMATED COUNT: 14.3 % (ref 12.3–15.4)
GLOBULIN UR ELPH-MCNC: 2.8 GM/DL
GLUCOSE SERPL-MCNC: 114 MG/DL (ref 65–99)
GLUCOSE UR STRIP-MCNC: NEGATIVE MG/DL
HCT VFR BLD AUTO: 33.6 % (ref 37.5–51)
HGB BLD-MCNC: 10.6 G/DL (ref 13–17.7)
HGB UR QL STRIP.AUTO: NEGATIVE
HYALINE CASTS UR QL AUTO: ABNORMAL /LPF
IMM GRANULOCYTES # BLD AUTO: 0.02 10*3/MM3 (ref 0–0.05)
IMM GRANULOCYTES NFR BLD AUTO: 0.3 % (ref 0–0.5)
KETONES UR QL STRIP: NEGATIVE
LEUKOCYTE ESTERASE UR QL STRIP.AUTO: ABNORMAL
LYMPHOCYTES # BLD AUTO: 2.47 10*3/MM3 (ref 0.7–3.1)
LYMPHOCYTES NFR BLD AUTO: 32.9 % (ref 19.6–45.3)
MCH RBC QN AUTO: 27.1 PG (ref 26.6–33)
MCHC RBC AUTO-ENTMCNC: 31.5 G/DL (ref 31.5–35.7)
MCV RBC AUTO: 85.9 FL (ref 79–97)
MONOCYTES # BLD AUTO: 0.79 10*3/MM3 (ref 0.1–0.9)
MONOCYTES NFR BLD AUTO: 10.5 % (ref 5–12)
NEUTROPHILS NFR BLD AUTO: 4.08 10*3/MM3 (ref 1.7–7)
NEUTROPHILS NFR BLD AUTO: 54.3 % (ref 42.7–76)
NITRITE UR QL STRIP: POSITIVE
NRBC BLD AUTO-RTO: 0 /100 WBC (ref 0–0.2)
NT-PROBNP SERPL-MCNC: 481 PG/ML (ref 0–1800)
PH UR STRIP.AUTO: 6 [PH] (ref 5–8)
PLATELET # BLD AUTO: 219 10*3/MM3 (ref 140–450)
PMV BLD AUTO: 11.6 FL (ref 6–12)
POTASSIUM SERPL-SCNC: 3.9 MMOL/L (ref 3.5–5.2)
PROT SERPL-MCNC: 6.8 G/DL (ref 6–8.5)
PROT UR QL STRIP: ABNORMAL
RBC # BLD AUTO: 3.91 10*6/MM3 (ref 4.14–5.8)
RBC # UR STRIP: ABNORMAL /HPF
REF LAB TEST METHOD: ABNORMAL
SODIUM SERPL-SCNC: 142 MMOL/L (ref 136–145)
SP GR UR STRIP: 1.02 (ref 1–1.03)
SQUAMOUS #/AREA URNS HPF: ABNORMAL /HPF
UROBILINOGEN UR QL STRIP: ABNORMAL
WBC # UR STRIP: ABNORMAL /HPF
WBC NRBC COR # BLD AUTO: 7.51 10*3/MM3 (ref 3.4–10.8)

## 2024-02-29 PROCEDURE — 36415 COLL VENOUS BLD VENIPUNCTURE: CPT

## 2024-02-29 PROCEDURE — 83880 ASSAY OF NATRIURETIC PEPTIDE: CPT

## 2024-02-29 PROCEDURE — 71046 X-RAY EXAM CHEST 2 VIEWS: CPT

## 2024-02-29 PROCEDURE — 80053 COMPREHEN METABOLIC PANEL: CPT

## 2024-02-29 PROCEDURE — 85025 COMPLETE CBC W/AUTO DIFF WBC: CPT

## 2024-02-29 PROCEDURE — 81001 URINALYSIS AUTO W/SCOPE: CPT

## 2024-03-01 RX ORDER — LEVOFLOXACIN 250 MG/1
250 TABLET, FILM COATED ORAL DAILY
Qty: 7 TABLET | Refills: 0 | Status: SHIPPED | OUTPATIENT
Start: 2024-03-01 | End: 2024-03-08

## 2024-03-13 ENCOUNTER — TELEPHONE (OUTPATIENT)
Dept: FAMILY MEDICINE CLINIC | Facility: CLINIC | Age: 84
End: 2024-03-13
Payer: MEDICARE

## 2024-03-13 DIAGNOSIS — R31.9 URINARY TRACT INFECTION WITH HEMATURIA, SITE UNSPECIFIED: Primary | ICD-10-CM

## 2024-03-13 DIAGNOSIS — N39.0 URINARY TRACT INFECTION WITH HEMATURIA, SITE UNSPECIFIED: Primary | ICD-10-CM

## 2024-03-13 NOTE — TELEPHONE ENCOUNTER
Patient wife is in to see Lanie today- she states her  is having constipation and has not had a bowel movement ion 5 days. States , he is depressed and wants to sleep all the time.

## 2024-03-19 ENCOUNTER — LAB (OUTPATIENT)
Dept: LAB | Facility: HOSPITAL | Age: 84
End: 2024-03-19
Payer: MEDICARE

## 2024-03-19 DIAGNOSIS — N39.0 URINARY TRACT INFECTION WITH HEMATURIA, SITE UNSPECIFIED: ICD-10-CM

## 2024-03-19 DIAGNOSIS — N18.30 STAGE 3 CHRONIC KIDNEY DISEASE, UNSPECIFIED WHETHER STAGE 3A OR 3B CKD: ICD-10-CM

## 2024-03-19 DIAGNOSIS — R31.9 URINARY TRACT INFECTION WITH HEMATURIA, SITE UNSPECIFIED: ICD-10-CM

## 2024-03-19 LAB
ALBUMIN SERPL-MCNC: 3.9 G/DL (ref 3.5–5.2)
ALBUMIN/GLOB SERPL: 1.2 G/DL
ALP SERPL-CCNC: 77 U/L (ref 39–117)
ALT SERPL W P-5'-P-CCNC: 8 U/L (ref 1–41)
ANION GAP SERPL CALCULATED.3IONS-SCNC: 12.9 MMOL/L (ref 5–15)
AST SERPL-CCNC: 10 U/L (ref 1–40)
BACTERIA UR QL AUTO: NORMAL /HPF
BILIRUB SERPL-MCNC: 0.3 MG/DL (ref 0–1.2)
BILIRUB UR QL STRIP: NEGATIVE
BUN SERPL-MCNC: 30 MG/DL (ref 8–23)
BUN/CREAT SERPL: 14.1 (ref 7–25)
CALCIUM SPEC-SCNC: 8.8 MG/DL (ref 8.6–10.5)
CHLORIDE SERPL-SCNC: 113 MMOL/L (ref 98–107)
CLARITY UR: CLEAR
CO2 SERPL-SCNC: 18.1 MMOL/L (ref 22–29)
COLOR UR: YELLOW
CREAT SERPL-MCNC: 2.13 MG/DL (ref 0.76–1.27)
EGFRCR SERPLBLD CKD-EPI 2021: 29.8 ML/MIN/1.73
GLOBULIN UR ELPH-MCNC: 3.3 GM/DL
GLUCOSE SERPL-MCNC: 107 MG/DL (ref 65–99)
GLUCOSE UR STRIP-MCNC: NEGATIVE MG/DL
HGB UR QL STRIP.AUTO: NEGATIVE
HYALINE CASTS UR QL AUTO: NORMAL /LPF
KETONES UR QL STRIP: NEGATIVE
LEUKOCYTE ESTERASE UR QL STRIP.AUTO: NEGATIVE
NITRITE UR QL STRIP: NEGATIVE
PH UR STRIP.AUTO: 5.5 [PH] (ref 5–8)
POTASSIUM SERPL-SCNC: 3.8 MMOL/L (ref 3.5–5.2)
PROT SERPL-MCNC: 7.2 G/DL (ref 6–8.5)
PROT UR QL STRIP: ABNORMAL
RBC # UR STRIP: NORMAL /HPF
REF LAB TEST METHOD: NORMAL
SODIUM SERPL-SCNC: 144 MMOL/L (ref 136–145)
SP GR UR STRIP: 1.02 (ref 1–1.03)
SQUAMOUS #/AREA URNS HPF: NORMAL /HPF
UROBILINOGEN UR QL STRIP: ABNORMAL
WBC # UR STRIP: NORMAL /HPF

## 2024-03-19 PROCEDURE — 36415 COLL VENOUS BLD VENIPUNCTURE: CPT

## 2024-03-19 PROCEDURE — 87086 URINE CULTURE/COLONY COUNT: CPT

## 2024-03-19 PROCEDURE — 81001 URINALYSIS AUTO W/SCOPE: CPT

## 2024-03-19 PROCEDURE — 80053 COMPREHEN METABOLIC PANEL: CPT

## 2024-03-20 LAB — BACTERIA SPEC AEROBE CULT: NO GROWTH

## 2024-05-28 ENCOUNTER — OFFICE VISIT (OUTPATIENT)
Dept: FAMILY MEDICINE CLINIC | Facility: CLINIC | Age: 84
End: 2024-05-28
Payer: MEDICARE

## 2024-05-28 VITALS
RESPIRATION RATE: 16 BRPM | HEART RATE: 79 BPM | BODY MASS INDEX: 30.32 KG/M2 | HEIGHT: 69 IN | DIASTOLIC BLOOD PRESSURE: 100 MMHG | TEMPERATURE: 98.2 F | SYSTOLIC BLOOD PRESSURE: 168 MMHG | WEIGHT: 204.7 LBS

## 2024-05-28 DIAGNOSIS — E03.9 ACQUIRED HYPOTHYROIDISM: ICD-10-CM

## 2024-05-28 DIAGNOSIS — N18.30 STAGE 3 CHRONIC KIDNEY DISEASE, UNSPECIFIED WHETHER STAGE 3A OR 3B CKD: ICD-10-CM

## 2024-05-28 DIAGNOSIS — D50.8 OTHER IRON DEFICIENCY ANEMIA: ICD-10-CM

## 2024-05-28 DIAGNOSIS — E78.2 MIXED HYPERLIPIDEMIA: ICD-10-CM

## 2024-05-28 DIAGNOSIS — I10 ESSENTIAL HYPERTENSION: Primary | ICD-10-CM

## 2024-05-28 DIAGNOSIS — E11.9 TYPE 2 DIABETES MELLITUS WITHOUT COMPLICATION, WITH LONG-TERM CURRENT USE OF INSULIN: ICD-10-CM

## 2024-05-28 DIAGNOSIS — Z79.4 TYPE 2 DIABETES MELLITUS WITHOUT COMPLICATION, WITH LONG-TERM CURRENT USE OF INSULIN: ICD-10-CM

## 2024-05-28 DIAGNOSIS — E55.9 VITAMIN D DEFICIENCY: ICD-10-CM

## 2024-05-28 PROCEDURE — G2211 COMPLEX E/M VISIT ADD ON: HCPCS | Performed by: NURSE PRACTITIONER

## 2024-05-28 PROCEDURE — 3080F DIAST BP >= 90 MM HG: CPT | Performed by: NURSE PRACTITIONER

## 2024-05-28 PROCEDURE — 3077F SYST BP >= 140 MM HG: CPT | Performed by: NURSE PRACTITIONER

## 2024-05-28 PROCEDURE — 99213 OFFICE O/P EST LOW 20 MIN: CPT | Performed by: NURSE PRACTITIONER

## 2024-05-28 PROCEDURE — 1159F MED LIST DOCD IN RCRD: CPT | Performed by: NURSE PRACTITIONER

## 2024-05-28 PROCEDURE — 1125F AMNT PAIN NOTED PAIN PRSNT: CPT | Performed by: NURSE PRACTITIONER

## 2024-05-28 PROCEDURE — 1160F RVW MEDS BY RX/DR IN RCRD: CPT | Performed by: NURSE PRACTITIONER

## 2024-05-28 RX ORDER — LANCETS 28 GAUGE
1 EACH MISCELLANEOUS AS NEEDED
COMMUNITY

## 2024-05-28 RX ORDER — FERROUS SULFATE 325(65) MG
325 TABLET ORAL
COMMUNITY

## 2024-05-28 RX ORDER — BLOOD SUGAR DIAGNOSTIC
STRIP MISCELLANEOUS
COMMUNITY

## 2024-05-28 RX ORDER — TAMSULOSIN HYDROCHLORIDE 0.4 MG/1
2 CAPSULE ORAL DAILY
COMMUNITY

## 2024-05-28 RX ORDER — INSULIN GLARGINE 100 [IU]/ML
20 INJECTION, SOLUTION SUBCUTANEOUS NIGHTLY
COMMUNITY

## 2024-05-28 RX ORDER — BLOOD SUGAR DIAGNOSTIC
STRIP MISCELLANEOUS
COMMUNITY
End: 2024-05-28 | Stop reason: SDUPTHER

## 2024-05-28 RX ORDER — LEVOTHYROXINE SODIUM 0.03 MG/1
25 TABLET ORAL
COMMUNITY

## 2024-05-28 RX ORDER — PRAVASTATIN SODIUM 80 MG/1
80 TABLET ORAL DAILY
COMMUNITY

## 2024-05-28 RX ORDER — FINASTERIDE 5 MG/1
5 TABLET, FILM COATED ORAL DAILY
COMMUNITY

## 2024-05-28 RX ORDER — LANOLIN ALCOHOL/MO/W.PET/CERES
0.4 CREAM (GRAM) TOPICAL DAILY
COMMUNITY

## 2024-05-28 NOTE — PROGRESS NOTES
Chief Complaint  Diabetes, Hyperlipidemia, Hypertension, and Allergic Rhinitis (Runny nose after eye surgery - can he take allergy med?)    Subjective          Too Velasquez presents to [facility]  History of Present Illness  He is having allergy issues and want's to know what he can take OTC for the runny nose.    HTN; he is taking his Norvasc/lisinopril daily.  No chest pain shortness of breath noted.  LIPID: He is taking his pravastatin with no leg pain.  DM: He has been checking his sugars daily.  He is on insulin currently.  He is not having anything higher than 120 in the mornings.  He has been having issues with some 70's at times  BPH: He is taking his Flomax without any issues regarding his prostate but he still does have some frequency.  Urology has him currently on Flomax and they also wanted him on finasteride. He he takes 2 flomax he gets a little nauseated but he does take them on an empty stomach.  ANEMIA: He is taking his folic acid and his iron daily no blood in the stool that he can see.  THYROID: He is taking his Synthroid daily at 25 mcg he cannot tell any difference.  CKD: Kidneys are stable  [facility].  He will be seeing me one more time at 6 mths then every year after that.         Depression: Not at risk (5/28/2024)    PHQ-2     PHQ-2 Score: 0    and 5/28/2024               Allergies  Penicillins    Social History     Tobacco Use    Smoking status: Never    Smokeless tobacco: Never   Vaping Use    Vaping status: Never Used   Substance Use Topics    Alcohol use: Never    Drug use: Never       Family History   Problem Relation Age of Onset    Heart disease Mother     Diabetes Mother         Unspecified    Heart disease Father     Diabetes Sister         Unspecified        Health Maintenance Due   Topic Date Due    HEMOGLOBIN A1C  08/26/2024        Immunization History   Administered Date(s) Administered    COVID-19  "(MODERNA) 1st,2nd,3rd Dose Monovalent 01/29/2021, 02/26/2021, 11/01/2021, 01/07/2022, 11/21/2022    COVID-19 (MODERNA) BIVALENT 12+YRS 11/21/2022    COVID-19 (MODERNA) Monovalent Original Booster 01/07/2022    COVID-19 (PFIZER) Purple Cap Monovalent 11/15/2023    Flu Vaccine Quad PF >36MO 10/01/2020    Fluzone (or Fluarix & Flulaval for VFC) >6mos 12/24/2019, 10/09/2020    Fluzone High-Dose 65+yrs 12/02/2021, 11/22/2022, 11/28/2023    Influenza Seasonal Injectable 11/15/2023    Influenza, Unspecified 11/01/2013, 10/01/2020, 12/02/2021    PEDS-Pneumococcal Conjugate (PCV7) 11/15/2023    Pneumococcal Conjugate 13-Valent (PCV13) 12/05/2017, 10/20/2018    Pneumococcal Conjugate 20-Valent (PCV20) 11/28/2023    Pneumococcal Polysaccharide (PPSV23) 09/24/2018    Td, Unspecified 06/01/2011       Review of Systems   Constitutional:  Positive for fatigue.   Respiratory:  Negative for cough and shortness of breath.    Cardiovascular:  Negative for chest pain.   Gastrointestinal:  Negative for diarrhea, nausea and vomiting.        Objective       Vitals:    05/28/24 1115 05/28/24 1133   BP: 158/100 168/100   Pulse: 79    Resp: 16    Temp: 98.2 °F (36.8 °C)    Weight: 92.9 kg (204 lb 11.2 oz)    Height: 175.3 cm (69\")        Body mass index is 30.23 kg/m².         Physical Exam  Vitals reviewed.   Constitutional:       Appearance: Normal appearance. He is well-developed.   Cardiovascular:      Rate and Rhythm: Normal rate and regular rhythm.      Heart sounds: Normal heart sounds. No murmur heard.  Pulmonary:      Effort: Pulmonary effort is normal.      Breath sounds: Normal breath sounds.   Neurological:      Mental Status: He is alert and oriented to person, place, and time.      Cranial Nerves: No cranial nerve deficit.      Motor: No weakness.   Psychiatric:         Mood and Affect: Mood and affect normal.               Result Review :     The following data was reviewed by: BRIAN Arizmendi on " 05/28/2024:    Common Labs   Common labs          2/26/2024    11:50 2/29/2024    10:39 3/19/2024    11:00   Common Labs   Glucose  114  107    BUN  36  30    Creatinine  2.37  2.13    Sodium  142  144    Potassium  3.9  3.8    Chloride  114  113    Calcium  8.8  8.8    Albumin  4.0  3.9    Total Bilirubin  0.2  0.3    Alkaline Phosphatase  73  77    AST (SGOT)  8  10    ALT (SGPT)  7  8    WBC  7.51     Hemoglobin  10.6     Hematocrit  33.6     Platelets  219     Hemoglobin A1C 6.60              XR Chest PA & Lateral    Result Date: 2/29/2024   No acute process.     JIMMY TAYLOR MD       Electronically Signed and Approved By: JIMMY TAYLOR MD on 2/29/2024 at 13:40                         Assessment and Plan      Assessment & Plan  Type 2 diabetes mellitus without complication, with long-term current use of insulin    Essential hypertension    Mixed hyperlipidemia     Vitamin D deficiency    Acquired hypothyroidism    Stage 3 chronic kidney disease, unspecified whether stage 3a or 3b CKD    Other iron deficiency anemia               Diagnosis Plan   1. Essential hypertension        2. Type 2 diabetes mellitus without complication, with long-term current use of insulin        3. Mixed hyperlipidemia        4. Vitamin D deficiency        5. Acquired hypothyroidism        6. Stage 3 chronic kidney disease, unspecified whether stage 3a or 3b CKD        7. Other iron deficiency anemia                  Follow Up     Return in about 6 months (around 11/28/2024) for Medicare Wellness 11/28/2024.  [facility].  I will see him back in Nov for a AWV then we will go yearly unless he needs something between that time.    Patient was given instructions and counseling regarding his condition or for health maintenance advice. Please see specific information pulled into the AVS if appropriate.     Parts of this note are electronic transcriptions/translations of spoken language to printed text using the Dragon  Dictation system.          Lanie Murillo, APRN  05/28/2024

## 2024-06-07 ENCOUNTER — LAB (OUTPATIENT)
Dept: LAB | Facility: HOSPITAL | Age: 84
End: 2024-06-07
Payer: MEDICARE

## 2024-06-07 ENCOUNTER — TRANSCRIBE ORDERS (OUTPATIENT)
Dept: ADMINISTRATIVE | Facility: HOSPITAL | Age: 84
End: 2024-06-07
Payer: MEDICARE

## 2024-06-07 DIAGNOSIS — N18.30 STAGE 3 CHRONIC KIDNEY DISEASE, UNSPECIFIED WHETHER STAGE 3A OR 3B CKD: ICD-10-CM

## 2024-06-07 DIAGNOSIS — N18.30 STAGE 3 CHRONIC KIDNEY DISEASE, UNSPECIFIED WHETHER STAGE 3A OR 3B CKD: Primary | ICD-10-CM

## 2024-06-07 LAB
ALBUMIN SERPL-MCNC: 4.1 G/DL (ref 3.5–5.2)
ANION GAP SERPL CALCULATED.3IONS-SCNC: 11 MMOL/L (ref 5–15)
BACTERIA UR QL AUTO: ABNORMAL /HPF
BASOPHILS # BLD AUTO: 0.08 10*3/MM3 (ref 0–0.2)
BASOPHILS NFR BLD AUTO: 1.1 % (ref 0–1.5)
BILIRUB UR QL STRIP: NEGATIVE
BUN SERPL-MCNC: 33 MG/DL (ref 8–23)
BUN/CREAT SERPL: 14.6 (ref 7–25)
CALCIUM SPEC-SCNC: 8.8 MG/DL (ref 8.6–10.5)
CHLORIDE SERPL-SCNC: 110 MMOL/L (ref 98–107)
CLARITY UR: CLEAR
CO2 SERPL-SCNC: 22 MMOL/L (ref 22–29)
COLOR UR: YELLOW
CREAT SERPL-MCNC: 2.26 MG/DL (ref 0.76–1.27)
CREAT UR-MCNC: 111 MG/DL
DEPRECATED RDW RBC AUTO: 42.8 FL (ref 37–54)
EGFRCR SERPLBLD CKD-EPI 2021: 27.7 ML/MIN/1.73
EOSINOPHIL # BLD AUTO: 0.16 10*3/MM3 (ref 0–0.4)
EOSINOPHIL NFR BLD AUTO: 2.1 % (ref 0.3–6.2)
ERYTHROCYTE [DISTWIDTH] IN BLOOD BY AUTOMATED COUNT: 13.6 % (ref 12.3–15.4)
GLUCOSE SERPL-MCNC: 93 MG/DL (ref 65–99)
GLUCOSE UR STRIP-MCNC: NEGATIVE MG/DL
HCT VFR BLD AUTO: 36.2 % (ref 37.5–51)
HGB BLD-MCNC: 11.7 G/DL (ref 13–17.7)
HGB UR QL STRIP.AUTO: NEGATIVE
HYALINE CASTS UR QL AUTO: ABNORMAL /LPF
IMM GRANULOCYTES # BLD AUTO: 0.02 10*3/MM3 (ref 0–0.05)
IMM GRANULOCYTES NFR BLD AUTO: 0.3 % (ref 0–0.5)
KETONES UR QL STRIP: NEGATIVE
LEUKOCYTE ESTERASE UR QL STRIP.AUTO: NEGATIVE
LYMPHOCYTES # BLD AUTO: 2.82 10*3/MM3 (ref 0.7–3.1)
LYMPHOCYTES NFR BLD AUTO: 37.1 % (ref 19.6–45.3)
MCH RBC QN AUTO: 28.2 PG (ref 26.6–33)
MCHC RBC AUTO-ENTMCNC: 32.3 G/DL (ref 31.5–35.7)
MCV RBC AUTO: 87.2 FL (ref 79–97)
MONOCYTES # BLD AUTO: 0.67 10*3/MM3 (ref 0.1–0.9)
MONOCYTES NFR BLD AUTO: 8.8 % (ref 5–12)
NEUTROPHILS NFR BLD AUTO: 3.86 10*3/MM3 (ref 1.7–7)
NEUTROPHILS NFR BLD AUTO: 50.6 % (ref 42.7–76)
NITRITE UR QL STRIP: NEGATIVE
NRBC BLD AUTO-RTO: 0 /100 WBC (ref 0–0.2)
PH UR STRIP.AUTO: 6.5 [PH] (ref 5–8)
PHOSPHATE SERPL-MCNC: 3.4 MG/DL (ref 2.5–4.5)
PLATELET # BLD AUTO: 198 10*3/MM3 (ref 140–450)
PMV BLD AUTO: 11.2 FL (ref 6–12)
POTASSIUM SERPL-SCNC: 4.1 MMOL/L (ref 3.5–5.2)
PROT ?TM UR-MCNC: 27.2 MG/DL
PROT UR QL STRIP: ABNORMAL
PROT/CREAT UR: 0.25 MG/G{CREAT}
PTH-INTACT SERPL-MCNC: 67.3 PG/ML (ref 15–65)
RBC # BLD AUTO: 4.15 10*6/MM3 (ref 4.14–5.8)
RBC # UR STRIP: ABNORMAL /HPF
REF LAB TEST METHOD: ABNORMAL
SODIUM SERPL-SCNC: 143 MMOL/L (ref 136–145)
SP GR UR STRIP: 1.02 (ref 1–1.03)
SQUAMOUS #/AREA URNS HPF: ABNORMAL /HPF
UROBILINOGEN UR QL STRIP: ABNORMAL
WBC # UR STRIP: ABNORMAL /HPF
WBC NRBC COR # BLD AUTO: 7.61 10*3/MM3 (ref 3.4–10.8)

## 2024-06-07 PROCEDURE — 83970 ASSAY OF PARATHORMONE: CPT

## 2024-06-07 PROCEDURE — 36415 COLL VENOUS BLD VENIPUNCTURE: CPT

## 2024-06-07 PROCEDURE — 82570 ASSAY OF URINE CREATININE: CPT

## 2024-06-07 PROCEDURE — 81001 URINALYSIS AUTO W/SCOPE: CPT

## 2024-06-07 PROCEDURE — 85025 COMPLETE CBC W/AUTO DIFF WBC: CPT

## 2024-06-07 PROCEDURE — 84156 ASSAY OF PROTEIN URINE: CPT

## 2024-06-07 PROCEDURE — 80069 RENAL FUNCTION PANEL: CPT

## 2024-12-03 ENCOUNTER — OFFICE VISIT (OUTPATIENT)
Dept: FAMILY MEDICINE CLINIC | Facility: CLINIC | Age: 84
End: 2024-12-03
Payer: MEDICARE

## 2024-12-03 VITALS
DIASTOLIC BLOOD PRESSURE: 90 MMHG | TEMPERATURE: 98 F | SYSTOLIC BLOOD PRESSURE: 170 MMHG | BODY MASS INDEX: 30.14 KG/M2 | HEIGHT: 69 IN | WEIGHT: 203.5 LBS | HEART RATE: 62 BPM | OXYGEN SATURATION: 99 % | RESPIRATION RATE: 18 BRPM

## 2024-12-03 DIAGNOSIS — Z00.00 MEDICARE ANNUAL WELLNESS VISIT, SUBSEQUENT: Primary | ICD-10-CM

## 2024-12-03 DIAGNOSIS — E11.9 TYPE 2 DIABETES MELLITUS WITHOUT COMPLICATION, WITH LONG-TERM CURRENT USE OF INSULIN: ICD-10-CM

## 2024-12-03 DIAGNOSIS — L98.9 SKIN LESION: ICD-10-CM

## 2024-12-03 DIAGNOSIS — R32 URINARY INCONTINENCE, UNSPECIFIED TYPE: ICD-10-CM

## 2024-12-03 DIAGNOSIS — I10 ESSENTIAL HYPERTENSION: ICD-10-CM

## 2024-12-03 DIAGNOSIS — Z23 NEED FOR INFLUENZA VACCINATION: ICD-10-CM

## 2024-12-03 DIAGNOSIS — D50.8 IRON DEFICIENCY ANEMIA SECONDARY TO INADEQUATE DIETARY IRON INTAKE: ICD-10-CM

## 2024-12-03 DIAGNOSIS — E78.2 MIXED HYPERLIPIDEMIA: ICD-10-CM

## 2024-12-03 DIAGNOSIS — E55.9 VITAMIN D DEFICIENCY: ICD-10-CM

## 2024-12-03 DIAGNOSIS — Z79.4 TYPE 2 DIABETES MELLITUS WITHOUT COMPLICATION, WITH LONG-TERM CURRENT USE OF INSULIN: ICD-10-CM

## 2024-12-03 DIAGNOSIS — E03.9 ACQUIRED HYPOTHYROIDISM: ICD-10-CM

## 2024-12-03 PROCEDURE — 1170F FXNL STATUS ASSESSED: CPT | Performed by: NURSE PRACTITIONER

## 2024-12-03 PROCEDURE — G0439 PPPS, SUBSEQ VISIT: HCPCS | Performed by: NURSE PRACTITIONER

## 2024-12-03 PROCEDURE — 1125F AMNT PAIN NOTED PAIN PRSNT: CPT | Performed by: NURSE PRACTITIONER

## 2024-12-03 PROCEDURE — 90662 IIV NO PRSV INCREASED AG IM: CPT | Performed by: NURSE PRACTITIONER

## 2024-12-03 PROCEDURE — 1160F RVW MEDS BY RX/DR IN RCRD: CPT | Performed by: NURSE PRACTITIONER

## 2024-12-03 PROCEDURE — 99213 OFFICE O/P EST LOW 20 MIN: CPT | Performed by: NURSE PRACTITIONER

## 2024-12-03 PROCEDURE — G0008 ADMIN INFLUENZA VIRUS VAC: HCPCS | Performed by: NURSE PRACTITIONER

## 2024-12-03 PROCEDURE — 1159F MED LIST DOCD IN RCRD: CPT | Performed by: NURSE PRACTITIONER

## 2024-12-03 RX ORDER — TOLTERODINE 2 MG/1
2 CAPSULE, EXTENDED RELEASE ORAL DAILY
Qty: 30 CAPSULE | Refills: 0 | Status: SHIPPED | OUTPATIENT
Start: 2024-12-03

## 2024-12-03 NOTE — PROGRESS NOTES
" Subjective   The ABCs of the Annual Wellness Visit  Medicare Wellness Visit      Too Velasquez is a 84 y.o. patient who presents for a Medicare Wellness Visit.    The following portions of the patient's history were reviewed and   updated as appropriate: allergies, current medications, past family history, past medical history, past social history, past surgical history, and problem list.    Compared to one year ago, the patient's physical   health is the same.  Compared to one year ago, the patient's mental   health is the same.    Recent Hospitalizations:  He was not admitted to the hospital during the last year.     Current Medical Providers:  Patient Care Team:  Lanie Murillo APRN as PCP - General (Nurse Practitioner)    Outpatient Medications Prior to Visit   Medication Sig Dispense Refill    acetaZOLAMIDE (DIAMOX) 250 MG tablet Take 1 tablet by mouth Every 12 (Twelve) Hours.      apixaban (ELIQUIS) 2.5 MG tablet tablet Take 1 tablet by mouth 2 (Two) Times a Day.      brimonidine (ALPHAGAN) 0.15 % ophthalmic solution       cholecalciferol (VITAMIN D3) 25 MCG (1000 UT) tablet Take 1 tablet by mouth Daily.      dilTIAZem CD (CARDIZEM CD) 240 MG 24 hr capsule Take 1 capsule by mouth Daily.      dorzolamide-timolol (COSOPT) 22.3-6.8 MG/ML ophthalmic solution       ferrous sulfate 325 (65 FE) MG tablet Take 1 tablet by mouth Daily With Breakfast.      finasteride (PROSCAR) 5 MG tablet Take 1 tablet by mouth Daily.      fluorometholone (FML) 0.1 % ophthalmic suspension       folic acid (FOLVITE) 400 MCG tablet Take 1 tablet by mouth Daily.      insulin glargine (LANTUS, SEMGLEE) 100 UNIT/ML injection Inject 20 Units under the skin into the appropriate area as directed Every Night.      Insulin Syringe-Needle U-100 31G X 5/16\" 0.3 ML misc Use.      Lancets (freestyle) lancets 1 each by Other route As Needed. Use as instructed      levothyroxine (SYNTHROID, LEVOTHROID) 25 MCG tablet Take 1 tablet by mouth " "Every Morning. [facility]      lisinopril (PRINIVIL,ZESTRIL) 40 MG tablet Take 1 tablet by mouth.      pravastatin (PRAVACHOL) 80 MG tablet Take 1 tablet by mouth Daily. [facility]      Rhopressa 0.02 % solution       tamsulosin (FLOMAX) 0.4 MG capsule 24 hr capsule Take 2 capsules by mouth Daily. Rx  [facility]      glucose blood test strip 1 each by Other route As Needed. Use as instructed       No facility-administered medications prior to visit.     No opioid medication identified on active medication list. I have reviewed chart for other potential  high risk medication/s and harmful drug interactions in the elderly.      Aspirin is not on active medication list.  Aspirin use is not indicated based on review of current medical condition/s. Risk of harm outweighs potential benefits.  .    Patient Active Problem List   Diagnosis    Atrial fibrillation    Benign prostatic hyperplasia without lower urinary tract symptoms    Essential hypertension    Glaucoma    Hyperlipidemia    Insomnia    Stage 3 chronic kidney disease    Type 2 diabetes mellitus without complication    Vitamin D deficiency    Iron deficiency anemia secondary to inadequate dietary iron intake    Acquired hypothyroidism    Absolute anemia    Diabetes mellitus    Gout    Metabolic acidosis    Class 1 obesity with serious comorbidity and body mass index (BMI) of 31.0 to 31.9 in adult    Low folic acid     Advance Care Planning Advance Directive is on file.  ACP discussion was declined by the patient. Patient has an advance directive in EMR which is still valid.             Objective   Vitals:    12/03/24 1047 12/03/24 1105   BP: (!) 206/90 170/90   Pulse: 62    Resp: 18    Temp: 98 °F (36.7 °C)    SpO2: 99%    Weight: 92.3 kg (203 lb 8 oz)    Height: 175.3 cm (69\")    PainSc:   7    PainLoc: Eye        Estimated body mass index is 30.05 kg/m² as calculated from the following:    Height as of this encounter: 175.3 cm (69\").    Weight as of this encounter: 92.3 kg " (203 lb 8 oz).    BMI is >= 30 and <35. (Class 1 Obesity). The following options were offered after discussion;: exercise counseling/recommendations       Does the patient have evidence of cognitive impairment? No                                                                                                Health  Risk Assessment    Smoking Status:  Social History     Tobacco Use   Smoking Status Never   Smokeless Tobacco Never     Alcohol Consumption:  Social History     Substance and Sexual Activity   Alcohol Use Never       Fall Risk Screen  STEADI Fall Risk Assessment was completed, and patient is at HIGH risk for falls. Assessment completed on:12/3/2024    Depression Screening   Little interest or pleasure in doing things? Not at all   Feeling down, depressed, or hopeless? Not at all   PHQ-2 Total Score 0      Health Habits and Functional and Cognitive Screenin/3/2024    10:58 AM   Functional & Cognitive Status   Do you have difficulty preparing food and eating? No   Do you have difficulty bathing yourself, getting dressed or grooming yourself? Yes   Do you have difficulty using the toilet? Yes   Do you have difficulty moving around from place to place? Yes   Do you have trouble with steps or getting out of a bed or a chair? Yes   Current Diet Well Balanced Diet   Dental Exam Not up to date   Eye Exam Up to date   Exercise (times per week) 0 times per week   Current Exercises Include No Regular Exercise   Do you need help using the phone?  Yes   Are you deaf or do you have serious difficulty hearing?  No   Do you need help to go to places out of walking distance? Yes   Do you need help shopping? Yes   Do you need help preparing meals?  Yes   Do you need help with housework?  Yes   Do you need help with laundry? Yes   Do you need help taking your medications? Yes   Do you need help managing money? Yes   Do you ever drive or ride in a car without wearing a seat belt? No   Have you felt unusual stress,  anger or loneliness in the last month? No   Who do you live with? Spouse   If you need help, do you have trouble finding someone available to you? No   Have you been bothered in the last four weeks by sexual problems? No   Do you have difficulty concentrating, remembering or making decisions? Yes           Age-appropriate Screening Schedule:  Refer to the list below for future screening recommendations based on patient's age, sex and/or medical conditions. Orders for these recommended tests are listed in the plan section. The patient has been provided with a written plan.    Health Maintenance List  Health Maintenance   Topic Date Due    DIABETIC FOOT EXAM  Never done    HEMOGLOBIN A1C  08/26/2024    BMI FOLLOWUP  11/28/2024    LIPID PANEL  11/29/2024    TDAP/TD VACCINES (1 - Tdap) 12/03/2024 (Originally 6/2/2011)    ZOSTER VACCINE (1 of 2) 12/03/2024 (Originally 7/17/1988)    COVID-19 Vaccine (9 - 2024-25 season) 02/22/2025 (Originally 9/1/2024)    RSV Vaccine - Adults (1 - 1-dose 75+ series) 02/28/2025 (Originally 7/17/2013)    DIABETIC EYE EXAM  02/13/2025    ANNUAL WELLNESS VISIT  12/03/2025    INFLUENZA VACCINE  Completed    Pneumococcal Vaccine 65+  Completed    URINE MICROALBUMIN  Discontinued                                                                                                                                                CMS Preventative Services Quick Reference  Risk Factors Identified During Encounter  Dental Screening Recommended  Vision Screening Recommended    The above risks/problems have been discussed with the patient.  Pertinent information has been shared with the patient in the After Visit Summary.  An After Visit Summary and PPPS were made available to the patient.    Follow Up:   Next Medicare Wellness visit to be scheduled in 1 year.         Additional E&M Note during same encounter follows:  Patient has additional, significant, and separately identifiable condition(s)/problem(s)  "that require work above and beyond the Medicare Wellness Visit     Chief Complaint  Medicare Wellness-subsequent, Hypertension, Rash (Right leg -states been there 2 weeks), and Urinary Incontinence (States he is wearing a brief and does not know when he is peeing)    Subjective   HPI  Too is also being seen today for additional medical problem/s.  He is here with his wife today.  [facility] but he is due labs with me on Thursday as he is getting blood work for the nephrologist.  He has an area on the backside of his right leg he said that [facility] which was a cream but it has not helped.  He gets a little itchiness but it is more irritated.  He is also having urinary incontinence.  He wears a briefs at all times and he never knows when he is peeing.  He is willing to try medication if that would help.  He is willing to get an MRI of his back and to look at the prostate.  Review of Systems   Constitutional:  Positive for fatigue.   Respiratory:  Negative for cough and shortness of breath.    Cardiovascular:  Negative for chest pain and leg swelling.   Gastrointestinal:  Negative for nausea and vomiting.   Genitourinary:  Positive for urgency.   Psychiatric/Behavioral:  Negative for hallucinations and suicidal ideas.               Objective   Vital Signs:  /90   Pulse 62   Temp 98 °F (36.7 °C)   Resp 18   Ht 175.3 cm (69\")   Wt 92.3 kg (203 lb 8 oz)   SpO2 99%   BMI 30.05 kg/m²   Physical Exam  Vitals reviewed.   Constitutional:       Appearance: Normal appearance. He is well-developed.   Cardiovascular:      Rate and Rhythm: Normal rate and regular rhythm.      Heart sounds: Normal heart sounds. No murmur heard.  Pulmonary:      Effort: Pulmonary effort is normal.      Breath sounds: Normal breath sounds.   Neurological:      Mental Status: He is alert and oriented to person, place, and time.      Cranial Nerves: No " cranial nerve deficit.      Motor: No weakness.   Psychiatric:         Mood and Affect: Mood and affect normal.                             Assessment and Plan            Medicare annual wellness visit, subsequent         Need for influenza vaccination    Orders:    Fluzone High-Dose 65+yrs ([id number])    Essential hypertension    He is aware his blood pressure is elevated today.  He has actually been eating a large amount of processed foods.  He has actually had spam every day for breakfast along with 2 banquet meals a day.  I feel that his blood pressure is elevated due to the sodium intake.  He is willing to cut back on that to see if the blood pressure goes down.  They will keep me posted in 1 week on how the blood pressure is doing.       Mixed hyperlipidemia       Orders:    CBC Auto Differential; Future    Comprehensive Metabolic Panel; Future    Lipid Panel With / Chol / HDL Ratio; Future    Vitamin D deficiency    Orders:    Vitamin D,25-Hydroxy; Future    Type 2 diabetes mellitus without complication, with long-term current use of insulin      Orders:    Hemoglobin A1c; Future    Acquired hypothyroidism    Orders:    CBC Auto Differential; Future    Comprehensive Metabolic Panel; Future    TSH; Future    T4, Free; Future    T3, Free; Future    Iron deficiency anemia secondary to inadequate dietary iron intake    Orders:    Iron Profile; Future    Ferritin; Future    Vitamin B12 & Folate; Future    Urinary incontinence, unspecified type  We will do a order for the MRI but also do the Detrol.  Will try 30 days.  Orders:    MRI Lumbar Spine Without Contrast; Future    tolterodine LA (Detrol LA) 2 MG 24 hr capsule; Take 1 capsule by mouth Daily.    Skin lesion  Discussed that we will go ahead and do a referral to dermatology  Orders:    Ambulatory Referral to Dermatology            Follow Up   Return in about 6 months (around 6/3/2025).  Patient was given instructions and counseling regarding his condition or  for health maintenance advice. Please see specific information pulled into the AVS if appropriate.

## 2024-12-03 NOTE — ASSESSMENT & PLAN NOTE
He is aware his blood pressure is elevated today.  He has actually been eating a large amount of processed foods.  He has actually had spam every day for breakfast along with 2 banquet meals a day.  I feel that his blood pressure is elevated due to the sodium intake.  He is willing to cut back on that to see if the blood pressure goes down.  They will keep me posted in 1 week on how the blood pressure is doing.

## 2024-12-03 NOTE — ASSESSMENT & PLAN NOTE
Orders:    CBC Auto Differential; Future    Comprehensive Metabolic Panel; Future    TSH; Future    T4, Free; Future    T3, Free; Future

## 2024-12-03 NOTE — ASSESSMENT & PLAN NOTE
Orders:    CBC Auto Differential; Future    Comprehensive Metabolic Panel; Future    Lipid Panel With / Chol / HDL Ratio; Future

## 2024-12-05 ENCOUNTER — LAB (OUTPATIENT)
Dept: LAB | Facility: HOSPITAL | Age: 84
End: 2024-12-05
Payer: MEDICARE

## 2024-12-05 ENCOUNTER — TRANSCRIBE ORDERS (OUTPATIENT)
Dept: LAB | Facility: HOSPITAL | Age: 84
End: 2024-12-05
Payer: MEDICARE

## 2024-12-05 DIAGNOSIS — E11.9 TYPE 2 DIABETES MELLITUS WITHOUT COMPLICATION, WITH LONG-TERM CURRENT USE OF INSULIN: ICD-10-CM

## 2024-12-05 DIAGNOSIS — E03.9 ACQUIRED HYPOTHYROIDISM: ICD-10-CM

## 2024-12-05 DIAGNOSIS — N18.30 STAGE 3 CHRONIC KIDNEY DISEASE, UNSPECIFIED WHETHER STAGE 3A OR 3B CKD: Primary | ICD-10-CM

## 2024-12-05 DIAGNOSIS — E55.9 VITAMIN D DEFICIENCY: ICD-10-CM

## 2024-12-05 DIAGNOSIS — E78.2 MIXED HYPERLIPIDEMIA: ICD-10-CM

## 2024-12-05 DIAGNOSIS — D50.8 IRON DEFICIENCY ANEMIA SECONDARY TO INADEQUATE DIETARY IRON INTAKE: ICD-10-CM

## 2024-12-05 DIAGNOSIS — Z79.4 TYPE 2 DIABETES MELLITUS WITHOUT COMPLICATION, WITH LONG-TERM CURRENT USE OF INSULIN: ICD-10-CM

## 2024-12-05 DIAGNOSIS — N18.30 STAGE 3 CHRONIC KIDNEY DISEASE, UNSPECIFIED WHETHER STAGE 3A OR 3B CKD: ICD-10-CM

## 2024-12-05 LAB
BACTERIA UR QL AUTO: NORMAL /HPF
BASOPHILS # BLD AUTO: 0.07 10*3/MM3 (ref 0–0.2)
BASOPHILS NFR BLD AUTO: 0.9 % (ref 0–1.5)
BILIRUB UR QL STRIP: NEGATIVE
CLARITY UR: CLEAR
COLOR UR: YELLOW
CREAT UR-MCNC: 131 MG/DL
DEPRECATED RDW RBC AUTO: 41.6 FL (ref 37–54)
EOSINOPHIL # BLD AUTO: 0.1 10*3/MM3 (ref 0–0.4)
EOSINOPHIL NFR BLD AUTO: 1.2 % (ref 0.3–6.2)
ERYTHROCYTE [DISTWIDTH] IN BLOOD BY AUTOMATED COUNT: 13 % (ref 12.3–15.4)
GLUCOSE UR STRIP-MCNC: NEGATIVE MG/DL
HCT VFR BLD AUTO: 35.1 % (ref 37.5–51)
HGB BLD-MCNC: 10.9 G/DL (ref 13–17.7)
HGB UR QL STRIP.AUTO: NEGATIVE
HYALINE CASTS UR QL AUTO: NORMAL /LPF
IMM GRANULOCYTES # BLD AUTO: 0.02 10*3/MM3 (ref 0–0.05)
IMM GRANULOCYTES NFR BLD AUTO: 0.2 % (ref 0–0.5)
KETONES UR QL STRIP: NEGATIVE
LEUKOCYTE ESTERASE UR QL STRIP.AUTO: NEGATIVE
LYMPHOCYTES # BLD AUTO: 2.71 10*3/MM3 (ref 0.7–3.1)
LYMPHOCYTES NFR BLD AUTO: 33.5 % (ref 19.6–45.3)
MCH RBC QN AUTO: 27.2 PG (ref 26.6–33)
MCHC RBC AUTO-ENTMCNC: 31.1 G/DL (ref 31.5–35.7)
MCV RBC AUTO: 87.5 FL (ref 79–97)
MONOCYTES # BLD AUTO: 0.79 10*3/MM3 (ref 0.1–0.9)
MONOCYTES NFR BLD AUTO: 9.8 % (ref 5–12)
NEUTROPHILS NFR BLD AUTO: 4.4 10*3/MM3 (ref 1.7–7)
NEUTROPHILS NFR BLD AUTO: 54.4 % (ref 42.7–76)
NITRITE UR QL STRIP: NEGATIVE
NRBC BLD AUTO-RTO: 0 /100 WBC (ref 0–0.2)
PH UR STRIP.AUTO: 5.5 [PH] (ref 5–8)
PLATELET # BLD AUTO: 206 10*3/MM3 (ref 140–450)
PMV BLD AUTO: 11.4 FL (ref 6–12)
PROT ?TM UR-MCNC: 38.9 MG/DL
PROT UR QL STRIP: ABNORMAL
PROT/CREAT UR: 0.3 MG/G{CREAT}
RBC # BLD AUTO: 4.01 10*6/MM3 (ref 4.14–5.8)
RBC # UR STRIP: NORMAL /HPF
REF LAB TEST METHOD: NORMAL
SP GR UR STRIP: 1.01 (ref 1–1.03)
SQUAMOUS #/AREA URNS HPF: NORMAL /HPF
UROBILINOGEN UR QL STRIP: ABNORMAL
WBC # UR STRIP: NORMAL /HPF
WBC NRBC COR # BLD AUTO: 8.09 10*3/MM3 (ref 3.4–10.8)

## 2024-12-05 PROCEDURE — 83036 HEMOGLOBIN GLYCOSYLATED A1C: CPT

## 2024-12-05 PROCEDURE — 82607 VITAMIN B-12: CPT

## 2024-12-05 PROCEDURE — 85025 COMPLETE CBC W/AUTO DIFF WBC: CPT

## 2024-12-05 PROCEDURE — 81001 URINALYSIS AUTO W/SCOPE: CPT

## 2024-12-05 PROCEDURE — 84439 ASSAY OF FREE THYROXINE: CPT

## 2024-12-05 PROCEDURE — 82746 ASSAY OF FOLIC ACID SERUM: CPT

## 2024-12-05 PROCEDURE — 84466 ASSAY OF TRANSFERRIN: CPT

## 2024-12-05 PROCEDURE — 82306 VITAMIN D 25 HYDROXY: CPT

## 2024-12-05 PROCEDURE — 83970 ASSAY OF PARATHORMONE: CPT

## 2024-12-05 PROCEDURE — 80061 LIPID PANEL: CPT

## 2024-12-05 PROCEDURE — 36415 COLL VENOUS BLD VENIPUNCTURE: CPT

## 2024-12-05 PROCEDURE — 84100 ASSAY OF PHOSPHORUS: CPT

## 2024-12-05 PROCEDURE — 82570 ASSAY OF URINE CREATININE: CPT

## 2024-12-05 PROCEDURE — 84443 ASSAY THYROID STIM HORMONE: CPT

## 2024-12-05 PROCEDURE — 84156 ASSAY OF PROTEIN URINE: CPT

## 2024-12-05 PROCEDURE — 83540 ASSAY OF IRON: CPT

## 2024-12-05 PROCEDURE — 80053 COMPREHEN METABOLIC PANEL: CPT

## 2024-12-05 PROCEDURE — 84481 FREE ASSAY (FT-3): CPT

## 2024-12-05 PROCEDURE — 82728 ASSAY OF FERRITIN: CPT

## 2024-12-06 LAB
25(OH)D3 SERPL-MCNC: 55.2 NG/ML (ref 30–100)
ALBUMIN SERPL-MCNC: 4 G/DL (ref 3.5–5.2)
ALBUMIN/GLOB SERPL: 1.2 G/DL
ALP SERPL-CCNC: 65 U/L (ref 39–117)
ALT SERPL W P-5'-P-CCNC: 11 U/L (ref 1–41)
ANION GAP SERPL CALCULATED.3IONS-SCNC: 11 MMOL/L (ref 5–15)
AST SERPL-CCNC: 13 U/L (ref 1–40)
BILIRUB SERPL-MCNC: 0.3 MG/DL (ref 0–1.2)
BUN SERPL-MCNC: 35 MG/DL (ref 8–23)
BUN/CREAT SERPL: 12.3 (ref 7–25)
CALCIUM SPEC-SCNC: 9.3 MG/DL (ref 8.6–10.5)
CHLORIDE SERPL-SCNC: 104 MMOL/L (ref 98–107)
CHOLEST SERPL-MCNC: 129 MG/DL (ref 0–200)
CO2 SERPL-SCNC: 23 MMOL/L (ref 22–29)
CREAT SERPL-MCNC: 2.84 MG/DL (ref 0.76–1.27)
EGFRCR SERPLBLD CKD-EPI 2021: 20.9 ML/MIN/1.73
FERRITIN SERPL-MCNC: 447 NG/ML (ref 30–400)
FOLATE SERPL-MCNC: >20 NG/ML (ref 4.78–24.2)
GLOBULIN UR ELPH-MCNC: 3.4 GM/DL
GLUCOSE SERPL-MCNC: 66 MG/DL (ref 65–99)
HBA1C MFR BLD: 6.9 % (ref 4.8–5.6)
HDLC SERPL QL: 3.07
HDLC SERPL-MCNC: 42 MG/DL (ref 40–60)
IRON 24H UR-MRATE: 55 MCG/DL (ref 59–158)
IRON SATN MFR SERPL: 21 % (ref 20–50)
LDLC SERPL CALC-MCNC: 71 MG/DL (ref 0–100)
PHOSPHATE SERPL-MCNC: 3.3 MG/DL (ref 2.5–4.5)
POTASSIUM SERPL-SCNC: 4.3 MMOL/L (ref 3.5–5.2)
PROT SERPL-MCNC: 7.4 G/DL (ref 6–8.5)
PTH-INTACT SERPL-MCNC: 57.4 PG/ML (ref 15–65)
SODIUM SERPL-SCNC: 138 MMOL/L (ref 136–145)
T3FREE SERPL-MCNC: 2.05 PG/ML (ref 2–4.4)
T4 FREE SERPL-MCNC: 1.31 NG/DL (ref 0.92–1.68)
TIBC SERPL-MCNC: 267 MCG/DL (ref 298–536)
TRANSFERRIN SERPL-MCNC: 179 MG/DL (ref 200–360)
TRIGL SERPL-MCNC: 79 MG/DL (ref 0–150)
TSH SERPL DL<=0.05 MIU/L-ACNC: 4.77 UIU/ML (ref 0.27–4.2)
VIT B12 BLD-MCNC: 367 PG/ML (ref 211–946)
VLDLC SERPL-MCNC: 16 MG/DL (ref 5–40)

## 2025-01-13 ENCOUNTER — APPOINTMENT (OUTPATIENT)
Dept: CT IMAGING | Facility: HOSPITAL | Age: 85
End: 2025-01-13
Payer: MEDICARE

## 2025-01-13 ENCOUNTER — HOSPITAL ENCOUNTER (EMERGENCY)
Facility: HOSPITAL | Age: 85
Discharge: HOME | End: 2025-01-13
Attending: EMERGENCY MEDICINE | Admitting: EMERGENCY MEDICINE
Payer: MEDICARE

## 2025-01-13 VITALS
TEMPERATURE: 98.4 F | SYSTOLIC BLOOD PRESSURE: 188 MMHG | HEIGHT: 69 IN | HEART RATE: 88 BPM | BODY MASS INDEX: 30.14 KG/M2 | OXYGEN SATURATION: 97 % | WEIGHT: 203.48 LBS | RESPIRATION RATE: 14 BRPM | DIASTOLIC BLOOD PRESSURE: 95 MMHG

## 2025-01-13 DIAGNOSIS — R10.9 FLANK PAIN: Primary | ICD-10-CM

## 2025-01-13 DIAGNOSIS — I10 HYPERTENSION, UNSPECIFIED TYPE: ICD-10-CM

## 2025-01-13 LAB
ALBUMIN SERPL-MCNC: 4 G/DL (ref 3.5–5.2)
ALBUMIN/GLOB SERPL: 1.2 G/DL
ALP SERPL-CCNC: 73 U/L (ref 39–117)
ALT SERPL W P-5'-P-CCNC: 10 U/L (ref 1–41)
ANION GAP SERPL CALCULATED.3IONS-SCNC: 10.5 MMOL/L (ref 5–15)
AST SERPL-CCNC: 11 U/L (ref 1–40)
BACTERIA UR QL AUTO: ABNORMAL /HPF
BASOPHILS # BLD AUTO: 0.07 10*3/MM3 (ref 0–0.2)
BASOPHILS NFR BLD AUTO: 0.8 % (ref 0–1.5)
BILIRUB SERPL-MCNC: 0.2 MG/DL (ref 0–1.2)
BILIRUB UR QL STRIP: NEGATIVE
BUN SERPL-MCNC: 36 MG/DL (ref 8–23)
BUN/CREAT SERPL: 14.3 (ref 7–25)
CALCIUM SPEC-SCNC: 8.5 MG/DL (ref 8.6–10.5)
CHLORIDE SERPL-SCNC: 107 MMOL/L (ref 98–107)
CLARITY UR: CLEAR
CO2 SERPL-SCNC: 20.5 MMOL/L (ref 22–29)
COLOR UR: YELLOW
CREAT SERPL-MCNC: 2.51 MG/DL (ref 0.76–1.27)
D-LACTATE SERPL-SCNC: 0.9 MMOL/L (ref 0.5–2)
DEPRECATED RDW RBC AUTO: 41.8 FL (ref 37–54)
EGFRCR SERPLBLD CKD-EPI 2021: 24.3 ML/MIN/1.73
EOSINOPHIL # BLD AUTO: 0.06 10*3/MM3 (ref 0–0.4)
EOSINOPHIL NFR BLD AUTO: 0.7 % (ref 0.3–6.2)
ERYTHROCYTE [DISTWIDTH] IN BLOOD BY AUTOMATED COUNT: 13.2 % (ref 12.3–15.4)
GLOBULIN UR ELPH-MCNC: 3.4 GM/DL
GLUCOSE SERPL-MCNC: 141 MG/DL (ref 65–99)
GLUCOSE UR STRIP-MCNC: ABNORMAL MG/DL
HCT VFR BLD AUTO: 34.5 % (ref 37.5–51)
HGB BLD-MCNC: 10.8 G/DL (ref 13–17.7)
HGB UR QL STRIP.AUTO: ABNORMAL
HOLD SPECIMEN: NORMAL
HOLD SPECIMEN: NORMAL
HYALINE CASTS UR QL AUTO: ABNORMAL /LPF
IMM GRANULOCYTES # BLD AUTO: 0.04 10*3/MM3 (ref 0–0.05)
IMM GRANULOCYTES NFR BLD AUTO: 0.4 % (ref 0–0.5)
KETONES UR QL STRIP: NEGATIVE
LEUKOCYTE ESTERASE UR QL STRIP.AUTO: NEGATIVE
LIPASE SERPL-CCNC: 31 U/L (ref 13–60)
LYMPHOCYTES # BLD AUTO: 1.64 10*3/MM3 (ref 0.7–3.1)
LYMPHOCYTES NFR BLD AUTO: 18.4 % (ref 19.6–45.3)
MCH RBC QN AUTO: 27.2 PG (ref 26.6–33)
MCHC RBC AUTO-ENTMCNC: 31.3 G/DL (ref 31.5–35.7)
MCV RBC AUTO: 86.9 FL (ref 79–97)
MONOCYTES # BLD AUTO: 0.54 10*3/MM3 (ref 0.1–0.9)
MONOCYTES NFR BLD AUTO: 6 % (ref 5–12)
NEUTROPHILS NFR BLD AUTO: 6.58 10*3/MM3 (ref 1.7–7)
NEUTROPHILS NFR BLD AUTO: 73.7 % (ref 42.7–76)
NITRITE UR QL STRIP: NEGATIVE
NRBC BLD AUTO-RTO: 0 /100 WBC (ref 0–0.2)
PH UR STRIP.AUTO: 7 [PH] (ref 5–8)
PLATELET # BLD AUTO: 178 10*3/MM3 (ref 140–450)
PMV BLD AUTO: 10.6 FL (ref 6–12)
POTASSIUM SERPL-SCNC: 4.3 MMOL/L (ref 3.5–5.2)
PROT SERPL-MCNC: 7.4 G/DL (ref 6–8.5)
PROT UR QL STRIP: ABNORMAL
RBC # BLD AUTO: 3.97 10*6/MM3 (ref 4.14–5.8)
RBC # UR STRIP: ABNORMAL /HPF
REF LAB TEST METHOD: ABNORMAL
SODIUM SERPL-SCNC: 138 MMOL/L (ref 136–145)
SP GR UR STRIP: 1.01 (ref 1–1.03)
SQUAMOUS #/AREA URNS HPF: ABNORMAL /HPF
UROBILINOGEN UR QL STRIP: ABNORMAL
WBC # UR STRIP: ABNORMAL /HPF
WBC NRBC COR # BLD AUTO: 8.93 10*3/MM3 (ref 3.4–10.8)
WHOLE BLOOD HOLD COAG: NORMAL
WHOLE BLOOD HOLD SPECIMEN: NORMAL

## 2025-01-13 PROCEDURE — 74176 CT ABD & PELVIS W/O CONTRAST: CPT

## 2025-01-13 PROCEDURE — 83690 ASSAY OF LIPASE: CPT | Performed by: EMERGENCY MEDICINE

## 2025-01-13 PROCEDURE — 85025 COMPLETE CBC W/AUTO DIFF WBC: CPT | Performed by: EMERGENCY MEDICINE

## 2025-01-13 PROCEDURE — 99284 EMERGENCY DEPT VISIT MOD MDM: CPT

## 2025-01-13 PROCEDURE — 36415 COLL VENOUS BLD VENIPUNCTURE: CPT

## 2025-01-13 PROCEDURE — 96376 TX/PRO/DX INJ SAME DRUG ADON: CPT

## 2025-01-13 PROCEDURE — 96374 THER/PROPH/DIAG INJ IV PUSH: CPT

## 2025-01-13 PROCEDURE — 25010000002 HYDRALAZINE PER 20 MG: Performed by: EMERGENCY MEDICINE

## 2025-01-13 PROCEDURE — 81001 URINALYSIS AUTO W/SCOPE: CPT | Performed by: EMERGENCY MEDICINE

## 2025-01-13 PROCEDURE — 83605 ASSAY OF LACTIC ACID: CPT | Performed by: EMERGENCY MEDICINE

## 2025-01-13 PROCEDURE — 80053 COMPREHEN METABOLIC PANEL: CPT | Performed by: EMERGENCY MEDICINE

## 2025-01-13 RX ORDER — HYDRALAZINE HYDROCHLORIDE 20 MG/ML
10 INJECTION INTRAMUSCULAR; INTRAVENOUS ONCE
Status: COMPLETED | OUTPATIENT
Start: 2025-01-13 | End: 2025-01-13

## 2025-01-13 RX ORDER — SODIUM CHLORIDE 0.9 % (FLUSH) 0.9 %
10 SYRINGE (ML) INJECTION AS NEEDED
Status: DISCONTINUED | OUTPATIENT
Start: 2025-01-13 | End: 2025-01-13 | Stop reason: HOSPADM

## 2025-01-13 RX ADMIN — HYDRALAZINE HYDROCHLORIDE 10 MG: 20 INJECTION INTRAMUSCULAR; INTRAVENOUS at 03:31

## 2025-01-13 RX ADMIN — HYDRALAZINE HYDROCHLORIDE 10 MG: 20 INJECTION INTRAMUSCULAR; INTRAVENOUS at 04:11

## 2025-01-13 NOTE — ED PROVIDER NOTES
Time: 4:05 AM EST  Date of encounter:  1/13/2025  Independent Historian/Clinical History and Information was obtained by:   Patient    History is limited by: N/A    Chief Complaint: flank pain      History of Present Illness:  Patient is a 84 y.o. year old male who presents to the emergency department for evaluation of Left flank pain radiating to left lower abdomen.  Patient is a patient at 00 30.  They called EMS.  EMS gave him Zofran, Toradol and fentanyl.  At time of evaluation pain had resolved.  Patient [place] any chest pain, shortness of breath, fever, chill, cough, congestion.  No other complaints.      Patient Care Team  Primary Care Provider: Lanie Murillo APRN    Past Medical History:     Allergies   Allergen Reactions    Penicillins Rash     Past Medical History:   Diagnosis Date    BPH (benign prostatic hyperplasia) 03/17/2021    Diabetes     Essential hypertension 03/17/2021    Glaucoma     Headache     High cholesterol     Hyperlipidemia 03/17/2021    Insomnia     Irregular heart rate     Sinusitis     SOB (shortness of breath)      Past Surgical History:   Procedure Laterality Date    CATARACT EXTRACTION  2008    EYE SURGERY  2024    right ear scarring    TUMOR REMOVAL Left     wind pipe left breast     Family History   Problem Relation Age of Onset    Heart disease Mother     Diabetes Mother         Unspecified    Heart disease Father     Diabetes Sister         Unspecified       Home Medications:  Prior to Admission medications    Medication Sig Start Date End Date Taking? Authorizing Provider   acetaZOLAMIDE (DIAMOX) 250 MG tablet Take 1 tablet by mouth Every 12 (Twelve) Hours. 2/13/24   Radha Reeder MD   apixaban (ELIQUIS) 2.5 MG tablet tablet Take 1 tablet by mouth 2 (Two) Times a Day.    Radha Reeder MD   brimonidine (ALPHAGAN) 0.15 % ophthalmic solution  5/30/23   Radha Reeder MD   cholecalciferol (VITAMIN D3) 25 MCG (1000 UT) tablet Take 1 tablet by mouth  "Daily.    Radha Reeder MD   dilTIAZem CD (CARDIZEM CD) 240 MG 24 hr capsule Take 1 capsule by mouth Daily.    Radha Reeder MD   dorzolamide-timolol (COSOPT) 22.3-6.8 MG/ML ophthalmic solution  6/7/21   Radha Reeder MD   ferrous sulfate 325 (65 FE) MG tablet Take 1 tablet by mouth Daily With Breakfast.    Radha Reeder MD   finasteride (PROSCAR) 5 MG tablet Take 1 tablet by mouth Daily.    Radha Reeder MD   fluorometholone (FML) 0.1 % ophthalmic suspension  5/30/23   Radha Reeder MD   folic acid (FOLVITE) 400 MCG tablet Take 1 tablet by mouth Daily.    Radha Reeder MD   glucose blood test strip 1 each by Other route As Needed. Use as instructed    Radha Reeder MD   insulin glargine (LANTUS, SEMGLEE) 100 UNIT/ML injection Inject 20 Units under the skin into the appropriate area as directed Every Night.    Radha Reeder MD   Insulin Syringe-Needle U-100 31G X 5/16\" 0.3 ML misc Use.    Radha Reeder MD   Lancets (freestyle) lancets 1 each by Other route As Needed. Use as instructed    Radha Reeder MD   levothyroxine (SYNTHROID, LEVOTHROID) 25 MCG tablet Take 1 tablet by mouth Every Morning. PER [facility]    Radha Reeder MD   lisinopril (PRINIVIL,ZESTRIL) 40 MG tablet Take 1 tablet by mouth. 6/27/23 12/3/24  Radha Reeder MD   pravastatin (PRAVACHOL) 80 MG tablet Take 1 tablet by mouth Daily. [facility]    Radha Reeder MD   Rhopressa 0.02 % solution  5/29/23   Radha Reeder MD   tamsulosin (FLOMAX) 0.4 MG capsule 24 hr capsule Take 2 capsules by mouth Daily. Rx  per [facility]    Radha Reeder MD   tolterodine LA (Detrol LA) 2 MG 24 hr capsule Take 1 capsule by mouth Daily. 12/3/24   Lanie Murillo APRN        Social History:   Social History     Tobacco Use    Smoking status: Never    Smokeless tobacco: Never   Vaping Use    Vaping status: Never Used   Substance Use Topics    Alcohol use: Never " "   Drug use: Never         Review of Systems:  Review of Systems   Constitutional:  Negative for chills and fever.   HENT:  Negative for congestion, ear pain and sore throat.    Eyes:  Negative for pain.   Respiratory:  Negative for cough, chest tightness and shortness of breath.    Cardiovascular:  Negative for chest pain.   Gastrointestinal:  Positive for abdominal pain. Negative for diarrhea, nausea and vomiting.   Genitourinary:  Positive for flank pain. Negative for hematuria.   Musculoskeletal:  Negative for joint swelling.   Skin:  Negative for pallor.   Neurological:  Negative for seizures and headaches.   All other systems reviewed and are negative.       Physical Exam:  BP (!) 184/95 (Patient Position: Lying)   Pulse 85   Temp 98.3 °F (36.8 °C) (Oral)   Resp 12   Ht 175.3 cm (69\")   Wt 92.3 kg (203 lb 7.8 oz)   SpO2 97%   BMI 30.05 kg/m²     Physical Exam  Constitutional:       Appearance: Normal appearance.   HENT:      Head: Normocephalic and atraumatic.      Nose: Nose normal.      Mouth/Throat:      Mouth: Mucous membranes are moist.   Eyes:      Extraocular Movements: Extraocular movements intact.      Conjunctiva/sclera: Conjunctivae normal.      Pupils: Pupils are equal, round, and reactive to light.   Cardiovascular:      Rate and Rhythm: Normal rate and regular rhythm.      Pulses: Normal pulses.      Heart sounds: Normal heart sounds.   Pulmonary:      Effort: Pulmonary effort is normal.      Breath sounds: Normal breath sounds.   Abdominal:      General: There is no distension.      Palpations: Abdomen is soft.      Tenderness: There is no abdominal tenderness.   Musculoskeletal:         General: Normal range of motion.      Cervical back: Normal range of motion.   Skin:     General: Skin is warm and dry.      Capillary Refill: Capillary refill takes less than 2 seconds.   Neurological:      General: No focal deficit present.      Mental Status: He is alert and oriented to person, place, " and time. Mental status is at baseline.   Psychiatric:         Mood and Affect: Mood normal.         Behavior: Behavior normal.                    Medical Decision Making:      Comorbidities that affect care:    BPH, HTN, HLD, DM    External Notes reviewed:    Previous Clinic Note: Patient seen by his PCP on 12/3/2024 for Medicare annual visit, vaccinations, hypertension, hyperlipidemia, vitamin D deficiency, diabetes, hypothyroidism, iron deficiency      The following orders were placed and all results were independently analyzed by me:  Orders Placed This Encounter   Procedures    CT Abdomen Pelvis Without Contrast    [place] Draw    Comprehensive Metabolic Panel    Lipase    Urinalysis With Microscopic If Indicated (No Culture) - Urine, Clean Catch    Lactic Acid, Plasma    CBC Auto Differential    Urinalysis, Microscopic Only - Urine, Clean Catch    NPO Diet NPO Type: Strict NPO    Undress & Gown    Insert Peripheral IV    CBC & Differential    Green Top (Gel)    Lavender Top    Gold Top - SST    Light Blue Top       Medications Given in the Emergency Department:  Medications   sodium chloride 0.9 % flush 10 mL (has no administration in time range)   hydrALAZINE (APRESOLINE) injection 10 mg (10 mg Intravenous Given 1/13/25 0331)   hydrALAZINE (APRESOLINE) injection 10 mg (10 mg Intravenous Given 1/13/25 0411)        ED Course:         Labs:    Lab Results (last 24 hours)       Procedure Component Value Units Date/Time    CBC & Differential [[id number]]  (Abnormal) Collected: 01/13/25 0205    Specimen: Blood Updated: 01/13/25 0216    Narrative:      The following orders were created for panel order CBC & Differential.  Procedure                               Abnormality         Status                     ---------                               -----------         ------                     CBC Auto Differential[[id number]]        Abnormal            Final result                 Please view results for these tests  on the individual orders.    Comprehensive Metabolic Panel [[id number]]  (Abnormal) Collected: 01/13/25 0205    Specimen: Blood Updated: 01/13/25 0233     Glucose 141 mg/dL      BUN 36 mg/dL      Creatinine 2.51 mg/dL      Sodium 138 mmol/L      Potassium 4.3 mmol/L      Chloride 107 mmol/L      CO2 20.5 mmol/L      Calcium 8.5 mg/dL      Total Protein 7.4 g/dL      Albumin 4.0 g/dL      ALT (SGPT) 10 U/L      AST (SGOT) 11 U/L      Alkaline Phosphatase 73 U/L      Total Bilirubin 0.2 mg/dL      Globulin 3.4 gm/dL      A/G Ratio 1.2 g/dL      BUN/Creatinine Ratio 14.3     Anion Gap 10.5 mmol/L      eGFR 24.3 mL/min/1.73     Narrative:      GFR Categories in Chronic Kidney Disease (CKD)      GFR Category          GFR (mL/min/1.73)    Interpretation  G1                     90 or greater         Normal or high (1)  G2                      60-89                Mild decrease (1)  G3a                   45-59                Mild to moderate decrease  G3b                   30-44                Moderate to severe decrease  G4                    15-29                Severe decrease  G5                    14 or less           Kidney failure          (1)In the absence of evidence of kidney disease, neither GFR category G1 or G2 fulfill the criteria for CKD.    eGFR calculation 2021 CKD-EPI creatinine equation, which does not include race as a factor    Lipase [[id number]]  (Normal) Collected: 01/13/25 0205    Specimen: Blood Updated: 01/13/25 0233     Lipase 31 U/L     CBC Auto Differential [[id number]]  (Abnormal) Collected: 01/13/25 0205    Specimen: Blood Updated: 01/13/25 0216     WBC 8.93 10*3/mm3      RBC 3.97 10*6/mm3      Hemoglobin 10.8 g/dL      Hematocrit 34.5 %      MCV 86.9 fL      MCH 27.2 pg      MCHC 31.3 g/dL      RDW 13.2 %      RDW-SD 41.8 fl      MPV 10.6 fL      Platelets 178 10*3/mm3      Neutrophil % 73.7 %      Lymphocyte % 18.4 %      Monocyte % 6.0 %      Eosinophil % 0.7 %      Basophil % 0.8 %       Immature Grans % 0.4 %      Neutrophils, Absolute 6.58 10*3/mm3      Lymphocytes, Absolute 1.64 10*3/mm3      Monocytes, Absolute 0.54 10*3/mm3      Eosinophils, Absolute 0.06 10*3/mm3      Basophils, Absolute 0.07 10*3/mm3      Immature Grans, Absolute 0.04 10*3/mm3      nRBC 0.0 /100 WBC     Lactic Acid, Plasma [[id number]]  (Normal) Collected: 01/13/25 0226    Specimen: Blood from Arm, Left Updated: 01/13/25 0243     Lactate 0.9 mmol/L     Urinalysis With Microscopic If Indicated (No Culture) - Urine, Clean Catch [[id number]]  (Abnormal) Collected: 01/13/25 0331    Specimen: Urine, Clean Catch Updated: 01/13/25 0343     Color, UA Yellow     Appearance, UA Clear     pH, UA 7.0     Specific Gravity, UA 1.010     Glucose,  mg/dL (Trace)     Ketones, UA Negative     Bilirubin, UA Negative     Blood, UA Trace     Protein, UA 30 mg/dL (1+)     Leuk Esterase, UA Negative     Nitrite, UA Negative     Urobilinogen, UA 0.2 E.U./dL    Urinalysis, Microscopic Only - Urine, Clean Catch [[id number]]  (Abnormal) Collected: 01/13/25 0331    Specimen: Urine, Clean Catch Updated: 01/13/25 0343     RBC, UA 3-5 /HPF      WBC, UA 0-2 /HPF      Bacteria, UA None Seen /HPF      Squamous Epithelial Cells, UA 0-2 /HPF      Hyaline Casts, UA None Seen /LPF      Methodology Automated Microscopy             Imaging:    CT Abdomen Pelvis Without Contrast    Result Date: 1/13/2025  CT ABDOMEN PELVIS WO CONTRAST-  Date of exam: 1/13/2025, 3:21 A.M.  Indication: abd. pain  Comparison: 12/29/2020.  TECHNIQUE: Axial CT images were obtained of the abdomen and pelvis without the administration of contrast. Reconstructed 2D coronal and sagittal images were also obtained. Automated exposure control and iterative construction methods were used. No oral contrast agent was administered for the study. The study is limited in that portions of the posterior abdominal and pelvic wall are excluded from the field-of-view.  FINDINGS: There is mild diffuse  prostatomegaly with distention of the urinary bladder. An age-indeterminate urinary bladder outlet obstruction is possible. No urinary bladder calculi are seen. No hydronephrosis. There are bilateral renal cysts (measuring about 8 cm or less in size), as seen previously. Atherosclerotic changes involve the abdominal aorta, which measures about 2.7 cm in greatest diameter. No acute retroperitoneal or intraperitoneal hemorrhage.  There may be borderline cardiac enlargement. A trace amount of pericardial effusion is seen. Mild linear fibrosis and/or subsegmental atelectasis involve(s) the lung bases. No acute infiltrate is identified. There is bilateral gynecomastia. A small hiatal hernia is seen. Gallstones are present without acute cholecystitis. No acute pancreatitis. No definite biliary ductal dilatation. No acute abnormality is seen involving the liver or spleen by nonenhanced CT. There is a stable centrally mineralized 2.4 cm mass, which probably arises from the superior left adrenal gland, as seen on image 38 of series 2 and adjacent images. No right adrenal mass is seen.  There is a moderate stool burden. There is new mild age-indeterminate increased attenuation within the upper left iliac fossa, as seen on images 136 through 166 of series 2. No definite acute diverticulitis is appreciated. There are colonic diverticula. Is no mechanical bowel obstruction. No pneumoperitoneum or pneumatosis. No acute appendicitis. There may be small bilateral inguinal hernias, larger on the right than the left, measuring about 2.6 cm in greatest diameter. A tiny umbilical hernia is possible. It measures about 0.9 cm in transverse diameter. These hernias do not contain bowel.  Degenerative changes are seen throughout the imaged spine. Degenerative changes involve the hip joints and the bilateral sacroiliac (SI) joints. No acute fracture. No aggressive osseous lesion is suggested. 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       No definite  acute findings are seen. Please see above comments for further detail.    Portions of this note were completed with a voice recognition program.  Electronically Signed By-Harmeet Fournier MD On:1/13/2025 3:40 AM         Differential Diagnosis and Discussion:    Abdominal Pain: Based on the patient's signs and symptoms, I considered abdominal aortic aneurysm, small bowel obstruction, pancreatitis, acute cholecystitis, acute appendecitis, peptic ulcer disease, gastritis, colitis, endocrine disorders, irritable bowel syndrome and other differential diagnosis an etiology of the patient's abdominal pain.  Flank Pain: Differential diagnosis includes but is not limited to kidney stones, pyelonephritis, musculoskeletal disorders, renal infarction, urinary tract infection, hydronephrosis, radiculopathy, aortic aneurysm, renal cell carcinoma.    PROCEDURES:    Labs were collected in the emergency department and all labs were reviewed and interpreted by me.  CT scan was performed in the emergency department and the CT scan radiology impression was interpreted by me.    No orders to display       Procedures    MDM  Number of Diagnoses or Management Options  Flank pain  Hypertension, unspecified type  Diagnosis management comments: Patient presents to the emergency department flank pain.  At time of evaluation states pain is completely resolved.  Patient did receive medication from EMS.  Labs were obtained that showed elevated creatinine of 2.5.  This appears similar when compared to previous.  UA shows trace blood no signs of infection.  CT was obtained that did not show acute findings.  Patient was also found to be hypertensive he was given medication.  He does have a history of hypertension.  Blood pressure did improve.  Recommend close follow-up with his primary care provider.  Discussed return precautions, discharge instructions and answered all his questions.       Amount and/or Complexity of Data Reviewed  Clinical lab  tests: reviewed  Tests in the radiology section of CPT®: reviewed  Review and summarize past medical records: yes  Independent visualization of images, tracings, or specimens: yes    Risk of Complications, Morbidity, and/or Mortality  Presenting problems: moderate  Management options: moderate                       Patient Care Considerations:    SEPSIS was considered but is NOT present in the emergency department as SIRS criteria is not present.      Consultants/Shared Management Plan:    None    Social Determinants of Health:    Patient has presented with family members who are responsible, reliable and will ensure follow up care.      Disposition and Care Coordination:    Discharged: The patient is suitable and stable for discharge with no need for consideration of admission.    I have explained the patient´s condition, diagnoses and treatment plan based on the information available to me at this time. I have answered questions and addressed any concerns. The patient has a good  understanding of the patient´s diagnosis, condition, and treatment plan as can be expected at this point. The vital signs have been stable. The patient´s condition is stable and appropriate for discharge from the emergency department.      The patient will pursue further outpatient evaluation with the primary care physician or other designated or consulting physician as outlined in the discharge instructions. They are agreeable to this plan of care and follow-up instructions have been explained in detail. The patient has received these instructions in written format and has expressed an understanding of the discharge instructions. The patient is aware that any significant change in condition or worsening of symptoms should prompt an immediate return to this or the closest emergency department or call to 911.  I have explained discharge medications and the need for follow up with the patient/caretakers. This was also printed in the  discharge instructions. Patient was discharged with the following medications and follow up:      Medication List      No changes were made to your prescriptions during this visit.      Lanie Murillo, APRN  [id number] [place] [id number] [phone]    In 2 days         Final diagnoses:   Flank pain   Hypertension, unspecified type        ED Disposition       ED Disposition   Discharge    Condition   Stable    Comment   --               This medical record created using voice recognition software.             Sujit Arroyo MD  01/13/25 0413       Sujit Arroyo MD  01/13/25 3780

## 2025-01-14 ENCOUNTER — HOSPITAL ENCOUNTER (OUTPATIENT)
Dept: MRI IMAGING | Facility: HOSPITAL | Age: 85
Discharge: HOME | End: 2025-01-14
Admitting: NURSE PRACTITIONER
Payer: MEDICARE

## 2025-01-14 DIAGNOSIS — R32 URINARY INCONTINENCE, UNSPECIFIED TYPE: ICD-10-CM

## 2025-01-14 PROCEDURE — 72148 MRI LUMBAR SPINE W/O DYE: CPT

## 2025-01-18 DIAGNOSIS — M48.062 SPINAL STENOSIS OF LUMBAR REGION WITH NEUROGENIC CLAUDICATION: Primary | ICD-10-CM

## 2025-01-18 DIAGNOSIS — N39.498 OTHER URINARY INCONTINENCE: ICD-10-CM

## 2025-02-12 ENCOUNTER — OFFICE VISIT (OUTPATIENT)
Dept: NEUROSURGERY | Facility: CLINIC | Age: 85
End: 2025-02-12
Payer: MEDICARE

## 2025-02-12 VITALS
SYSTOLIC BLOOD PRESSURE: 173 MMHG | BODY MASS INDEX: 30.07 KG/M2 | HEIGHT: 69 IN | DIASTOLIC BLOOD PRESSURE: 87 MMHG | HEART RATE: 64 BPM | WEIGHT: 203 LBS

## 2025-02-12 DIAGNOSIS — R32 URINARY INCONTINENCE, UNSPECIFIED TYPE: ICD-10-CM

## 2025-02-12 DIAGNOSIS — M48.061 SPINAL STENOSIS AT L4-L5 LEVEL: Primary | ICD-10-CM

## 2025-02-12 NOTE — PROGRESS NOTES
"Chief Complaint  Urinary Incontinence    Subjective          Too Velasquez who is a 84 y.o. year old male who presents to [facility] for Evaluation of the Spine.     He denies back or leg pain.    He reports urinary incontinence all day long. He sleeps on a pad.    He denies numbness, tingling or weakness in the legs or saddle distribution.    He reports having an episode of the left leg and foot pain to the big toe several days ago, but this resolved on its own.    Was this the result of an injury or accident? : No    History of Previous Spinal Surgery?: No     reports that he has never smoked. He has never used smokeless tobacco.    Review of Systems   Genitourinary:  Positive for urinary incontinence.   Musculoskeletal:  Negative for back pain.   Neurological:  Negative for weakness and numbness.        Objective   Vital Signs:   /87   Pulse 64   Ht 175.3 cm (69\")   Wt 92.1 kg (203 lb)   BMI 29.98 kg/m²       Physical Exam  Constitutional:       Appearance: Normal appearance.   Pulmonary:      Effort: Pulmonary effort is normal.   Musculoskeletal:         General: No tenderness.      Comments: SLR negative bilaterally   Neurological:      General: No focal deficit present.      Mental Status: He is alert and oriented to person, place, and time.      Sensory: No sensory deficit.      Motor: No weakness.      Deep Tendon Reflexes: Reflexes normal.   Psychiatric:         Mood and Affect: Mood normal.         Behavior: Behavior normal.        Neurological Exam  Mental Status  Alert. Oriented to person, place, and time.      Result Review     I have personally interpreted the MRI of the lumbar spine without contrast from 1/14/2025 which shows multilevel degenerative disc disease and facet arthritis.  There is severe central narrowing at L4-L5.  Otherwise there is no significant stenosis.     Assessment and Plan    Diagnoses and all orders for this visit:    1. 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 Urinary incontinence, unspecified type    He complains of urinary incontinence, but denies back pain, radicular pain or symptoms in the legs. He has no saddle anesthesia or loss of bowel function.    He does have severe stenosis at L4-L5 with bundling of the nerve roots below this leveling. It is possible that the stenosis would contribute to urinary incontinence.    He may discuss this further with Dr. Prince for a surgical recommendation.    He would like to take some time to think about this before discussing further. He will let us know if he would like to schedule an appointment to discuss with Dr. Prince.      Follow Up   Return for Next available Thursday to discuss surgery with Dr. Prince.  Patient was given instructions and counseling regarding his condition or for health maintenance advice. Please see specific information pulled into the AVS if appropriate.

## 2025-03-17 ENCOUNTER — TRANSCRIBE ORDERS (OUTPATIENT)
Dept: LAB | Facility: HOSPITAL | Age: 85
End: 2025-03-17
Payer: MEDICARE

## 2025-03-17 DIAGNOSIS — N18.4 CHRONIC KIDNEY DISEASE, STAGE IV (SEVERE): Primary | ICD-10-CM

## 2025-03-18 ENCOUNTER — LAB (OUTPATIENT)
Dept: LAB | Facility: HOSPITAL | Age: 85
End: 2025-03-18
Payer: MEDICARE

## 2025-03-18 DIAGNOSIS — N18.4 CHRONIC KIDNEY DISEASE, STAGE IV (SEVERE): ICD-10-CM

## 2025-03-18 LAB
ALBUMIN SERPL-MCNC: 3.8 G/DL (ref 3.5–5.2)
ANION GAP SERPL CALCULATED.3IONS-SCNC: 8.9 MMOL/L (ref 5–15)
BACTERIA UR QL AUTO: ABNORMAL /HPF
BILIRUB UR QL STRIP: NEGATIVE
BUN SERPL-MCNC: 32 MG/DL (ref 8–23)
BUN/CREAT SERPL: 11.9 (ref 7–25)
CALCIUM SPEC-SCNC: 8.8 MG/DL (ref 8.6–10.5)
CHLORIDE SERPL-SCNC: 109 MMOL/L (ref 98–107)
CLARITY UR: CLEAR
CO2 SERPL-SCNC: 22.1 MMOL/L (ref 22–29)
COLOR UR: YELLOW
CREAT SERPL-MCNC: 2.68 MG/DL (ref 0.76–1.27)
CREAT UR-MCNC: 158.1 MG/DL
DEPRECATED RDW RBC AUTO: 46.1 FL (ref 37–54)
EGFRCR SERPLBLD CKD-EPI 2021: 22.5 ML/MIN/1.73
ERYTHROCYTE [DISTWIDTH] IN BLOOD BY AUTOMATED COUNT: 14.3 % (ref 12.3–15.4)
GLUCOSE SERPL-MCNC: 96 MG/DL (ref 65–99)
GLUCOSE UR STRIP-MCNC: NEGATIVE MG/DL
HCT VFR BLD AUTO: 31.4 % (ref 37.5–51)
HGB BLD-MCNC: 9.8 G/DL (ref 13–17.7)
HGB UR QL STRIP.AUTO: NEGATIVE
HYALINE CASTS UR QL AUTO: ABNORMAL /LPF
KETONES UR QL STRIP: NEGATIVE
LEUKOCYTE ESTERASE UR QL STRIP.AUTO: NEGATIVE
MCH RBC QN AUTO: 27.6 PG (ref 26.6–33)
MCHC RBC AUTO-ENTMCNC: 31.2 G/DL (ref 31.5–35.7)
MCV RBC AUTO: 88.5 FL (ref 79–97)
NITRITE UR QL STRIP: NEGATIVE
PH UR STRIP.AUTO: 6.5 [PH] (ref 5–8)
PHOSPHATE SERPL-MCNC: 3.4 MG/DL (ref 2.5–4.5)
PLATELET # BLD AUTO: 173 10*3/MM3 (ref 140–450)
PMV BLD AUTO: 10.7 FL (ref 6–12)
POTASSIUM SERPL-SCNC: 4.2 MMOL/L (ref 3.5–5.2)
PROT ?TM UR-MCNC: 45.7 MG/DL
PROT UR QL STRIP: ABNORMAL
PROT/CREAT UR: 0.29 MG/G{CREAT}
PTH-INTACT SERPL-MCNC: 79.7 PG/ML (ref 15–65)
RBC # BLD AUTO: 3.55 10*6/MM3 (ref 4.14–5.8)
RBC # UR STRIP: ABNORMAL /HPF
REF LAB TEST METHOD: ABNORMAL
SODIUM SERPL-SCNC: 140 MMOL/L (ref 136–145)
SP GR UR STRIP: 1.02 (ref 1–1.03)
SQUAMOUS #/AREA URNS HPF: ABNORMAL /HPF
UROBILINOGEN UR QL STRIP: ABNORMAL
WBC # UR STRIP: ABNORMAL /HPF
WBC NRBC COR # BLD AUTO: 6.96 10*3/MM3 (ref 3.4–10.8)

## 2025-03-18 PROCEDURE — 83970 ASSAY OF PARATHORMONE: CPT

## 2025-03-18 PROCEDURE — 80069 RENAL FUNCTION PANEL: CPT

## 2025-03-18 PROCEDURE — 81001 URINALYSIS AUTO W/SCOPE: CPT

## 2025-03-18 PROCEDURE — 85027 COMPLETE CBC AUTOMATED: CPT

## 2025-03-18 PROCEDURE — 82570 ASSAY OF URINE CREATININE: CPT

## 2025-03-18 PROCEDURE — 84156 ASSAY OF PROTEIN URINE: CPT

## 2025-03-18 PROCEDURE — 36415 COLL VENOUS BLD VENIPUNCTURE: CPT

## 2025-03-31 ENCOUNTER — TRANSCRIBE ORDERS (OUTPATIENT)
Dept: ADMINISTRATIVE | Facility: HOSPITAL | Age: 85
End: 2025-03-31
Payer: MEDICARE

## 2025-03-31 DIAGNOSIS — N18.4 CHRONIC KIDNEY DISEASE, STAGE IV (SEVERE): Primary | ICD-10-CM

## 2025-04-15 ENCOUNTER — HOSPITAL ENCOUNTER (OUTPATIENT)
Dept: ULTRASOUND IMAGING | Facility: HOSPITAL | Age: 85
Discharge: HOME | End: 2025-04-15
Admitting: INTERNAL MEDICINE
Payer: MEDICARE

## 2025-04-15 DIAGNOSIS — N18.4 CHRONIC KIDNEY DISEASE, STAGE IV (SEVERE): ICD-10-CM

## 2025-04-15 PROCEDURE — 76775 US EXAM ABDO BACK WALL LIM: CPT

## 2025-06-04 ENCOUNTER — TELEPHONE (OUTPATIENT)
Dept: FAMILY MEDICINE CLINIC | Facility: CLINIC | Age: 85
End: 2025-06-04

## 2025-06-04 ENCOUNTER — OFFICE VISIT (OUTPATIENT)
Dept: FAMILY MEDICINE CLINIC | Facility: CLINIC | Age: 85
End: 2025-06-04
Payer: MEDICARE

## 2025-06-04 VITALS
DIASTOLIC BLOOD PRESSURE: 94 MMHG | OXYGEN SATURATION: 98 % | HEART RATE: 98 BPM | SYSTOLIC BLOOD PRESSURE: 180 MMHG | RESPIRATION RATE: 18 BRPM | BODY MASS INDEX: 31.99 KG/M2 | HEIGHT: 69 IN | TEMPERATURE: 97.7 F | WEIGHT: 216 LBS

## 2025-06-04 DIAGNOSIS — E55.9 VITAMIN D DEFICIENCY: ICD-10-CM

## 2025-06-04 DIAGNOSIS — R32 URINARY INCONTINENCE, UNSPECIFIED TYPE: ICD-10-CM

## 2025-06-04 DIAGNOSIS — E78.2 MIXED HYPERLIPIDEMIA: ICD-10-CM

## 2025-06-04 DIAGNOSIS — M48.061 SPINAL STENOSIS AT L4-L5 LEVEL: ICD-10-CM

## 2025-06-04 DIAGNOSIS — N18.30 STAGE 3 CHRONIC KIDNEY DISEASE, UNSPECIFIED WHETHER STAGE 3A OR 3B CKD: ICD-10-CM

## 2025-06-04 DIAGNOSIS — I10 ESSENTIAL HYPERTENSION: Primary | ICD-10-CM

## 2025-06-04 DIAGNOSIS — Z79.4 TYPE 2 DIABETES MELLITUS WITHOUT COMPLICATION, WITH LONG-TERM CURRENT USE OF INSULIN: ICD-10-CM

## 2025-06-04 DIAGNOSIS — D50.8 OTHER IRON DEFICIENCY ANEMIA: ICD-10-CM

## 2025-06-04 DIAGNOSIS — M79.89 LEG SWELLING: ICD-10-CM

## 2025-06-04 DIAGNOSIS — E11.9 TYPE 2 DIABETES MELLITUS WITHOUT COMPLICATION, WITH LONG-TERM CURRENT USE OF INSULIN: ICD-10-CM

## 2025-06-04 DIAGNOSIS — E03.9 ACQUIRED HYPOTHYROIDISM: ICD-10-CM

## 2025-06-04 RX ORDER — MUPIROCIN 20 MG/G
OINTMENT TOPICAL
COMMUNITY
Start: 2025-05-16

## 2025-06-04 RX ORDER — HYDRALAZINE HYDROCHLORIDE 25 MG/1
50 TABLET, FILM COATED ORAL
COMMUNITY
Start: 2025-03-27 | End: 2026-03-27

## 2025-06-04 RX ORDER — CLOBETASOL PROPIONATE 0.5 MG/G
OINTMENT TOPICAL
COMMUNITY
Start: 2025-05-16

## 2025-06-05 ENCOUNTER — LAB (OUTPATIENT)
Dept: LAB | Facility: HOSPITAL | Age: 85
End: 2025-06-05
Payer: MEDICARE

## 2025-06-05 ENCOUNTER — TRANSCRIBE ORDERS (OUTPATIENT)
Dept: LAB | Facility: HOSPITAL | Age: 85
End: 2025-06-05
Payer: MEDICARE

## 2025-06-05 DIAGNOSIS — Z79.4 TYPE 2 DIABETES MELLITUS WITHOUT COMPLICATION, WITH LONG-TERM CURRENT USE OF INSULIN: ICD-10-CM

## 2025-06-05 DIAGNOSIS — E03.9 ACQUIRED HYPOTHYROIDISM: ICD-10-CM

## 2025-06-05 DIAGNOSIS — D50.8 OTHER IRON DEFICIENCY ANEMIA: ICD-10-CM

## 2025-06-05 DIAGNOSIS — N18.4 CHRONIC RENAL DISEASE, STAGE IV: Primary | ICD-10-CM

## 2025-06-05 DIAGNOSIS — E78.2 MIXED HYPERLIPIDEMIA: ICD-10-CM

## 2025-06-05 DIAGNOSIS — R06.02 SHORTNESS OF BREATH: ICD-10-CM

## 2025-06-05 DIAGNOSIS — M79.89 LEG SWELLING: ICD-10-CM

## 2025-06-05 DIAGNOSIS — D64.9 ANEMIA, UNSPECIFIED TYPE: ICD-10-CM

## 2025-06-05 DIAGNOSIS — E55.9 VITAMIN D DEFICIENCY: ICD-10-CM

## 2025-06-05 DIAGNOSIS — N18.4 CHRONIC RENAL DISEASE, STAGE IV: ICD-10-CM

## 2025-06-05 DIAGNOSIS — E11.9 TYPE 2 DIABETES MELLITUS WITHOUT COMPLICATION, WITH LONG-TERM CURRENT USE OF INSULIN: ICD-10-CM

## 2025-06-05 DIAGNOSIS — I10 ESSENTIAL HYPERTENSION: ICD-10-CM

## 2025-06-05 LAB
25(OH)D3 SERPL-MCNC: 54.1 NG/ML (ref 30–100)
ALBUMIN SERPL-MCNC: 4.1 G/DL (ref 3.5–5.2)
ALBUMIN UR-MCNC: 7.8 MG/DL
ALBUMIN/GLOB SERPL: 1.2 G/DL
ALP SERPL-CCNC: 65 U/L (ref 39–117)
ALT SERPL W P-5'-P-CCNC: 7 U/L (ref 1–41)
ANION GAP SERPL CALCULATED.3IONS-SCNC: 13 MMOL/L (ref 5–15)
AST SERPL-CCNC: 12 U/L (ref 1–40)
BACTERIA UR QL AUTO: NORMAL /HPF
BASOPHILS # BLD AUTO: 0.06 10*3/MM3 (ref 0–0.2)
BASOPHILS NFR BLD AUTO: 0.8 % (ref 0–1.5)
BILIRUB SERPL-MCNC: 0.3 MG/DL (ref 0–1.2)
BILIRUB UR QL STRIP: NEGATIVE
BUN SERPL-MCNC: 41 MG/DL (ref 8–23)
BUN/CREAT SERPL: 13.6 (ref 7–25)
CALCIUM SPEC-SCNC: 9 MG/DL (ref 8.6–10.5)
CHLORIDE SERPL-SCNC: 110 MMOL/L (ref 98–107)
CHOLEST SERPL-MCNC: 131 MG/DL (ref 0–200)
CLARITY UR: CLEAR
CO2 SERPL-SCNC: 18 MMOL/L (ref 22–29)
COLOR UR: YELLOW
CREAT SERPL-MCNC: 3.01 MG/DL (ref 0.76–1.27)
CREAT UR-MCNC: 108.4 MG/DL
CREAT UR-MCNC: 111.1 MG/DL
DEPRECATED RDW RBC AUTO: 44.7 FL (ref 37–54)
EGFRCR SERPLBLD CKD-EPI 2021: 19.5 ML/MIN/1.73
EOSINOPHIL # BLD AUTO: 0.09 10*3/MM3 (ref 0–0.4)
EOSINOPHIL NFR BLD AUTO: 1.2 % (ref 0.3–6.2)
ERYTHROCYTE [DISTWIDTH] IN BLOOD BY AUTOMATED COUNT: 14.4 % (ref 12.3–15.4)
FERRITIN SERPL-MCNC: 487 NG/ML (ref 30–400)
FOLATE SERPL-MCNC: >20 NG/ML (ref 4.78–24.2)
GLOBULIN UR ELPH-MCNC: 3.4 GM/DL
GLUCOSE SERPL-MCNC: 66 MG/DL (ref 65–99)
GLUCOSE UR STRIP-MCNC: NEGATIVE MG/DL
HBA1C MFR BLD: 6.9 % (ref 4.8–5.6)
HCT VFR BLD AUTO: 31.3 % (ref 37.5–51)
HDLC SERPL-MCNC: 47 MG/DL (ref 40–60)
HGB BLD-MCNC: 10.3 G/DL (ref 13–17.7)
HGB UR QL STRIP.AUTO: NEGATIVE
HYALINE CASTS UR QL AUTO: NORMAL /LPF
IMM GRANULOCYTES # BLD AUTO: 0.02 10*3/MM3 (ref 0–0.05)
IMM GRANULOCYTES NFR BLD AUTO: 0.3 % (ref 0–0.5)
IRON 24H UR-MRATE: 57 MCG/DL (ref 59–158)
IRON SATN MFR SERPL: 22 % (ref 20–50)
KETONES UR QL STRIP: NEGATIVE
LDLC SERPL CALC-MCNC: 71 MG/DL (ref 0–100)
LDLC/HDLC SERPL: 1.51 {RATIO}
LEUKOCYTE ESTERASE UR QL STRIP.AUTO: NEGATIVE
LYMPHOCYTES # BLD AUTO: 2.22 10*3/MM3 (ref 0.7–3.1)
LYMPHOCYTES NFR BLD AUTO: 30.7 % (ref 19.6–45.3)
MCH RBC QN AUTO: 28.2 PG (ref 26.6–33)
MCHC RBC AUTO-ENTMCNC: 32.9 G/DL (ref 31.5–35.7)
MCV RBC AUTO: 85.8 FL (ref 79–97)
MICROALBUMIN/CREAT UR: 72 MG/G (ref 0–29)
MONOCYTES # BLD AUTO: 0.69 10*3/MM3 (ref 0.1–0.9)
MONOCYTES NFR BLD AUTO: 9.5 % (ref 5–12)
NEUTROPHILS NFR BLD AUTO: 4.16 10*3/MM3 (ref 1.7–7)
NEUTROPHILS NFR BLD AUTO: 57.5 % (ref 42.7–76)
NITRITE UR QL STRIP: NEGATIVE
NRBC BLD AUTO-RTO: 0 /100 WBC (ref 0–0.2)
PH UR STRIP.AUTO: 6 [PH] (ref 5–8)
PHOSPHATE SERPL-MCNC: 4 MG/DL (ref 2.5–4.5)
PLATELET # BLD AUTO: 191 10*3/MM3 (ref 140–450)
PMV BLD AUTO: 10.9 FL (ref 6–12)
POTASSIUM SERPL-SCNC: 3.9 MMOL/L (ref 3.5–5.2)
PROT ?TM UR-MCNC: 27.3 MG/DL
PROT SERPL-MCNC: 7.5 G/DL (ref 6–8.5)
PROT UR QL STRIP: ABNORMAL
PROT/CREAT UR: 0.25 MG/G{CREAT}
PTH-INTACT SERPL-MCNC: 77.8 PG/ML (ref 15–65)
RBC # BLD AUTO: 3.65 10*6/MM3 (ref 4.14–5.8)
RBC # UR STRIP: NORMAL /HPF
REF LAB TEST METHOD: NORMAL
SODIUM SERPL-SCNC: 141 MMOL/L (ref 136–145)
SP GR UR STRIP: 1.02 (ref 1–1.03)
SQUAMOUS #/AREA URNS HPF: NORMAL /HPF
T3FREE SERPL-MCNC: 2.41 PG/ML (ref 2–4.4)
T4 FREE SERPL-MCNC: 1.34 NG/DL (ref 0.92–1.68)
TIBC SERPL-MCNC: 255 MCG/DL (ref 298–536)
TRANSFERRIN SERPL-MCNC: 171 MG/DL (ref 200–360)
TRIGL SERPL-MCNC: 64 MG/DL (ref 0–150)
TSH SERPL DL<=0.05 MIU/L-ACNC: 4.78 UIU/ML (ref 0.27–4.2)
UROBILINOGEN UR QL STRIP: ABNORMAL
VIT B12 BLD-MCNC: 757 PG/ML (ref 211–946)
VLDLC SERPL-MCNC: 13 MG/DL (ref 5–40)
WBC # UR STRIP: NORMAL /HPF
WBC NRBC COR # BLD AUTO: 7.24 10*3/MM3 (ref 3.4–10.8)

## 2025-06-05 PROCEDURE — 83540 ASSAY OF IRON: CPT

## 2025-06-05 PROCEDURE — 84439 ASSAY OF FREE THYROXINE: CPT

## 2025-06-05 PROCEDURE — 85025 COMPLETE CBC W/AUTO DIFF WBC: CPT

## 2025-06-05 PROCEDURE — 84481 FREE ASSAY (FT-3): CPT

## 2025-06-05 PROCEDURE — 84156 ASSAY OF PROTEIN URINE: CPT

## 2025-06-05 PROCEDURE — 82728 ASSAY OF FERRITIN: CPT

## 2025-06-05 PROCEDURE — 82043 UR ALBUMIN QUANTITATIVE: CPT

## 2025-06-05 PROCEDURE — 80053 COMPREHEN METABOLIC PANEL: CPT

## 2025-06-05 PROCEDURE — 36415 COLL VENOUS BLD VENIPUNCTURE: CPT

## 2025-06-05 PROCEDURE — 84100 ASSAY OF PHOSPHORUS: CPT

## 2025-06-05 PROCEDURE — 84466 ASSAY OF TRANSFERRIN: CPT

## 2025-06-05 PROCEDURE — 81001 URINALYSIS AUTO W/SCOPE: CPT

## 2025-06-05 PROCEDURE — 83880 ASSAY OF NATRIURETIC PEPTIDE: CPT

## 2025-06-05 PROCEDURE — 83036 HEMOGLOBIN GLYCOSYLATED A1C: CPT

## 2025-06-05 PROCEDURE — 82746 ASSAY OF FOLIC ACID SERUM: CPT

## 2025-06-05 PROCEDURE — 84443 ASSAY THYROID STIM HORMONE: CPT

## 2025-06-05 PROCEDURE — 82306 VITAMIN D 25 HYDROXY: CPT

## 2025-06-05 PROCEDURE — 82570 ASSAY OF URINE CREATININE: CPT

## 2025-06-05 PROCEDURE — 80061 LIPID PANEL: CPT

## 2025-06-05 PROCEDURE — 83970 ASSAY OF PARATHORMONE: CPT

## 2025-06-05 PROCEDURE — 82607 VITAMIN B-12: CPT

## 2025-06-05 NOTE — TELEPHONE ENCOUNTER
Spoke with Kelsea at [facility] she is aware yes to adding nursing.   [phone]

## 2025-06-06 ENCOUNTER — TELEPHONE (OUTPATIENT)
Dept: FAMILY MEDICINE CLINIC | Facility: CLINIC | Age: 85
End: 2025-06-06
Payer: MEDICARE

## 2025-06-06 DIAGNOSIS — I10 ESSENTIAL HYPERTENSION: ICD-10-CM

## 2025-06-06 DIAGNOSIS — R06.02 SHORTNESS OF BREATH: Primary | ICD-10-CM

## 2025-06-06 DIAGNOSIS — M79.89 LEG SWELLING: ICD-10-CM

## 2025-06-06 LAB — NT-PROBNP SERPL-MCNC: 1025 PG/ML (ref 0–1800)

## 2025-06-06 NOTE — TELEPHONE ENCOUNTER
FIRST VISIT SCOTTIE WITH [employer]    CALLED TO GET VERBAL ORDERS - SHE IS AWARE PROVIDER IS OUT    PT HAS 3-4 PLUS EDEMA WANTS TO START COMPRESSION WRAPS    /92    CRACKLES IN LEFT LOWER LOBE     CONCERNED ABOUT CHF AND UNCONTROLLED BP    PHONE [phone]

## 2025-06-06 NOTE — TELEPHONE ENCOUNTER
I called delia back with [facility] and Let her know That he needs a BMP and that he could make a appointment [facility] or [facility] or [facility] can [facility]. She stated that I needed to call him to make a appointment because she was Just a RN with [facility]   I did give her SOC orders   And instructed to call nephrology about the blood pressure

## 2025-06-07 NOTE — PROGRESS NOTES
Chief Complaint  Hypertension and Urinary Frequency (States Detrol did not help)    Subjective          Too Velasquez presents to [facility]  Hypertension  Urinary Frequency  Associated symptoms: frequency        History of Present Illness  The patient presents for evaluation of hypertension, urinary incontinence, and lower extremity edema.    He has been adhering to his prescribed antihypertensive regimen, which includes Cardizem and hydralazine administered 3 times daily. However, he has not been monitoring his blood pressure at home despite having the necessary equipment. His dietary intake this morning consisted of kramer, eggs, and toast with [place] butter. He was previously on lisinopril, but it was discontinued by Dr. Anguiano. At his last appointment on 03/27/2025, his blood pressure was recorded at 172/80, leading to an increase in hydralazine to three times a day.    He reports experiencing urinary incontinence during both daytime and nighttime hours. He has consulted with Dr. Hernandez regarding this issue and was informed that the severe narrowing at L4-L5 could be contributing to his symptoms. He has been advised to consider surgery to alleviate the pressure on the nerve root.    He also reports persistent swelling in his lower extremities. Attempts to manage this with support stockings have been unsuccessful due to difficulty in application. He has been advised to consider the lymphedema clinic for further management.      Patient or patient representative verbalized consent for the use of Ambient Listening during the visit with  BRIAN Arizmendi for chart documentation. 6/7/2025  15:15 EDT      Depression: Not at risk (6/4/2025)    PHQ-2     PHQ-2 Score: 0    and                Allergies  Penicillins    Social History     Tobacco Use    Smoking status: Never    Smokeless tobacco: Never   Vaping Use    Vaping status: Never Used   Substance Use Topics    Alcohol  "use: Never    Drug use: Never       Family History   Problem Relation Age of Onset    Heart disease Mother     Diabetes Mother         Unspecified    Heart disease Father     Diabetes Sister         Unspecified        Health Maintenance Due   Topic Date Due    DIABETIC FOOT EXAM  Never done        Immunization History   Administered Date(s) Administered    COVID-19 (MODERNA) 12YRS+ (SPIKEVAX) 12/08/2023, 12/19/2024    COVID-19 (MODERNA) 1st,2nd,3rd Dose Monovalent 01/29/2021, 02/26/2021, 11/01/2021, 01/07/2022, 11/21/2022    COVID-19 (MODERNA) BIVALENT 12+YRS 11/21/2022    COVID-19 (MODERNA) Monovalent Original Booster 01/07/2022    COVID-19 (PFIZER) Purple Cap Monovalent 11/15/2023    Flu Vaccine Quad PF >36MO 10/01/2020    Fluzone (or Fluarix & Flulaval for VFC) >6mos 12/24/2019, 10/09/2020    Fluzone High-Dose 65+YRS 12/03/2024    Fluzone High-Dose 65+yrs 12/02/2021, 11/22/2022, 11/28/2023    Influenza Seasonal Injectable 11/15/2023    Influenza, Unspecified 11/01/2013, 10/01/2020, 12/02/2021    PEDS-Pneumococcal Conjugate (PCV7) 11/15/2023    Pneumococcal Conjugate 13-Valent (PCV13) 12/05/2017, 10/20/2018    Pneumococcal Conjugate 20-Valent (PCV20) 11/28/2023    Pneumococcal Polysaccharide (PPSV23) 09/24/2018    Td, Unspecified 06/01/2011       Review of Systems   Cardiovascular:  Positive for leg swelling.   Genitourinary:  Positive for frequency.        Objective       Vitals:    06/04/25 1129   BP: 180/94   Pulse: 98   Resp: 18   Temp: 97.7 °F (36.5 °C)   SpO2: 98%   Weight: 98 kg (216 lb)   Height: 175.3 cm (69\")       Body mass index is 31.9 kg/m².         Physical Exam  Vitals reviewed.   Constitutional:       Appearance: Normal appearance. He is well-developed. He is ill-appearing.   Cardiovascular:      Rate and Rhythm: Normal rate and regular rhythm.      Heart sounds: Normal heart sounds. No murmur heard.  Pulmonary:      Effort: Pulmonary effort is normal.      Breath sounds: Normal breath sounds. "   Musculoskeletal:         General: Swelling present.      Right lower leg: Edema present.      Left lower leg: Edema present.   Neurological:      Mental Status: He is alert and oriented to person, place, and time.      Cranial Nerves: No cranial nerve deficit.      Motor: No weakness.   Psychiatric:         Mood and Affect: Mood and affect normal.           Physical Exam                Result Review :     The following data was reviewed by: BRIAN Arizmendi on 06/04/2025:    Common Labs   Common labs          1/13/2025    02:05 3/18/2025    12:27 6/5/2025    10:16 6/5/2025    10:22   Common Labs   Glucose 141  96  66     BUN 36  32  41.0     Creatinine 2.51  2.68  3.01     Sodium 138  140  141     Potassium 4.3  4.2  3.9     Chloride 107  109  110     Calcium 8.5  8.8  9.0     Albumin 4.0  3.8  4.1     Total Bilirubin 0.2   0.3     Alkaline Phosphatase 73   65     AST (SGOT) 11   12     ALT (SGPT) 10   7     WBC 8.93  6.96  7.24     Hemoglobin 10.8  9.8  10.3     Hematocrit 34.5  31.4  31.3     Platelets 178  173  191     Total Cholesterol   131     Triglycerides   64     HDL Cholesterol   47     LDL Cholesterol    71     Hemoglobin A1C   6.90     Microalbumin, Urine    7.8            US Renal Bilateral  Result Date: 4/15/2025  Impression: 1. No hydronephrosis. 2. Bilateral renal cysts. 3. Mild bilateral renal cortical thinning. 4. Mild generalized right renal atrophy. Electronically Signed: Kary Tavarez MD  4/15/2025 4:32 PM EDT  Workstation ID: [id number]           Results  Imaging   - MRI of back: Mild degenerative changes except for severe narrowing at L4-L5.                    Assessment and Plan      Assessment & Plan  Essential hypertension      Orders:    Ambulatory Referral to Home Health    Mixed hyperlipidemia       Orders:    Comprehensive Metabolic Panel; Future    TSH; Future    Lipid Panel; Future    Ambulatory Referral to Home Health    Type 2 diabetes mellitus without complication, with  long-term current use of insulin      Orders:    CBC & Differential; Future    Hemoglobin A1c; Future    Microalbumin / Creatinine Urine Ratio - Urine, Clean Catch; Future    Ambulatory Referral to Home Health    Vitamin D deficiency    Orders:    Vitamin D,25-Hydroxy; Future    Ambulatory Referral to Home Health    Acquired hypothyroidism    Orders:    TSH; Future    T3, Free; Future    T4, Free; Future    Ambulatory Referral to Home Health    Urinary incontinence, unspecified type    Orders:    Ambulatory Referral to Neurosurgery    Ambulatory Referral to Home Health    Stage 3 chronic kidney disease, unspecified whether stage 3a or 3b CKD      Orders:    Ambulatory Referral to Home Health    Spinal stenosis at L4-L5 level    Orders:    Ambulatory Referral to Neurosurgery    Ambulatory Referral to Home Health    Other iron deficiency anemia    Orders:    Iron Profile w/o Ferritin; Future    Ferritin; Future    Vitamin B12 & Folate; Future    Ambulatory Referral to Home Health    Leg swelling    Orders:    Ambulatory Referral to Home Health       Diagnosis Plan   1. Essential hypertension  Ambulatory Referral to Home Health      2. Mixed hyperlipidemia  Comprehensive Metabolic Panel    TSH    Lipid Panel    Ambulatory Referral to Home Health      3. Type 2 diabetes mellitus without complication, with long-term current use of insulin  CBC & Differential    Hemoglobin A1c    Microalbumin / Creatinine Urine Ratio - Urine, Clean Catch    Ambulatory Referral to Home Health      4. Vitamin D deficiency  Vitamin D,25-Hydroxy    Ambulatory Referral to Home Health      5. Acquired hypothyroidism  TSH    T3, Free    T4, Free    Ambulatory Referral to Home Health      6. Urinary incontinence, unspecified type  Ambulatory Referral to Neurosurgery    Ambulatory Referral to Home Health      7. Stage 3 chronic kidney disease, unspecified whether stage 3a or 3b CKD  Ambulatory Referral to Home Health      8. Spinal stenosis at  L4-L5 level  Ambulatory Referral to Neurosurgery    Ambulatory Referral to Home Health      9. Other iron deficiency anemia  Iron Profile w/o Ferritin    Ferritin    Vitamin B12 & Folate    Ambulatory Referral to Home Health      10. Leg swelling  Ambulatory Referral to Home Health            Assessment & Plan  1. Hypertension.  - His blood pressure readings were significantly elevated today, measuring 216/105 with the machine and 180/94 manually.  - This is concerning as it may exert additional strain on his renal function. His previous reading was 172/80 on 03/27/2025, at which time his hydralazine dosage was increased from twice to three times daily.  - A consultation with Dr. Anguiano's office will be initiated to discuss the elevated blood pressure and potential medication adjustments.  - He is advised to monitor his blood pressure at home, both in the morning and afternoon, for a duration of one week and subsequently report the readings.    2. Urinary incontinence.  - He has been experiencing urinary incontinence throughout the day, which could potentially be attributed to nerve compression in his back.  - MRI of the back showed severe narrowing at L4-L5, which may be contributing to the urinary incontinence.  - A referral to Dr. Prince will be made for further evaluation and potential surgical intervention to alleviate the pressure on the nerve root.  - Discussion with Dr. Prince is recommended to consider minimally invasive surgery to improve symptoms.    3. Lower extremity edema.  - He presents with lower extremity edema, which could be a manifestation of renal insufficiency.  - Physical examination reveals tightness in the legs without oozing, and he is wearing diabetic socks.  - A referral to the lymphedema clinic will be made for leg wrapping to manage the swelling.  - Additionally, a home health service will be arranged to provide education on leg wrapping techniques.    4. Health maintenance.  - Orders  for blood work have been placed to assess vitamin D levels, thyroid function, glucose levels, and cholesterol profile.  - The results will be communicated upon receipt.  - Blood work is scheduled for tomorrow.          Follow Up     Return in about 6 months (around 12/4/2025).    Patient was given instructions and counseling regarding his condition or for health maintenance advice. Please see specific information pulled into the AVS if appropriate.     Parts of this note are electronic transcriptions/translations of spoken language to printed text using the Dragon Dictation system.          Lanie Murillo, BRIAN  06/04/2025

## 2025-06-07 NOTE — ASSESSMENT & PLAN NOTE
Orders:    TSH; Future    T3, Free; Future    T4, Free; Future    Ambulatory Referral to Home Health

## 2025-06-07 NOTE — ASSESSMENT & PLAN NOTE
Orders:    CBC & Differential; Future    Hemoglobin A1c; Future    Microalbumin / Creatinine Urine Ratio - Urine, Clean Catch; Future    Ambulatory Referral to Home Health

## 2025-06-07 NOTE — ASSESSMENT & PLAN NOTE
Orders:    Comprehensive Metabolic Panel; Future    TSH; Future    Lipid Panel; Future    Ambulatory Referral to Home Health

## 2025-06-07 NOTE — ASSESSMENT & PLAN NOTE
Orders:    Iron Profile w/o Ferritin; Future    Ferritin; Future    Vitamin B12 & Folate; Future    Ambulatory Referral to Home Health

## 2025-06-11 ENCOUNTER — TELEPHONE (OUTPATIENT)
Dept: FAMILY MEDICINE CLINIC | Facility: CLINIC | Age: 85
End: 2025-06-11
Payer: MEDICARE

## 2025-06-11 NOTE — TELEPHONE ENCOUNTER
Steff called [facility] states she needs verbal for OT ONCE PER WEEK X 4 WEEKS. [phone]

## 2025-06-13 ENCOUNTER — TELEPHONE (OUTPATIENT)
Dept: FAMILY MEDICINE CLINIC | Facility: CLINIC | Age: 85
End: 2025-06-13

## 2025-06-13 NOTE — TELEPHONE ENCOUNTER
wants a verbal order on getting tele monitoring equipment to monitor blood pressure sent to his home.

## 2025-06-18 ENCOUNTER — TELEPHONE (OUTPATIENT)
Dept: FAMILY MEDICINE CLINIC | Facility: CLINIC | Age: 85
End: 2025-06-18
Payer: MEDICARE

## 2025-06-18 NOTE — TELEPHONE ENCOUNTER
"Conner with [facility] patient's blood pressure elevated 190/80's some 90\"s. Wife takes blood pressure in the morning 1 hr after taking medication. Conner visit yeasterday /78. Swelling in legs much better. Patient has been eating a lot of bologna. Seems to hydrated. She did discuss diet with patient & wife.   [phone]  "

## 2025-06-27 ENCOUNTER — TRANSCRIBE ORDERS (OUTPATIENT)
Age: 85
End: 2025-06-27
Payer: MEDICARE

## 2025-06-27 DIAGNOSIS — N18.4 CHRONIC KIDNEY DISEASE, STAGE IV (SEVERE): Primary | ICD-10-CM

## 2025-07-02 ENCOUNTER — TELEPHONE (OUTPATIENT)
Dept: FAMILY MEDICINE CLINIC | Facility: CLINIC | Age: 85
End: 2025-07-02
Payer: MEDICARE

## 2025-07-02 NOTE — TELEPHONE ENCOUNTER
[facility] and states that his bp 160/110. He went to his urologist on thur and they ordered him a new med but it has not come in. [facility] reached out to urologist office and the provider is on vacation but they are trying contact him to see if he will send RX to [facility].

## 2025-07-08 ENCOUNTER — OFFICE VISIT (OUTPATIENT)
Dept: NEUROSURGERY | Facility: CLINIC | Age: 85
End: 2025-07-08
Payer: MEDICARE

## 2025-07-08 VITALS
HEIGHT: 69 IN | WEIGHT: 213.4 LBS | SYSTOLIC BLOOD PRESSURE: 133 MMHG | BODY MASS INDEX: 31.61 KG/M2 | DIASTOLIC BLOOD PRESSURE: 76 MMHG

## 2025-07-08 DIAGNOSIS — R32 URINARY INCONTINENCE, UNSPECIFIED TYPE: ICD-10-CM

## 2025-07-08 DIAGNOSIS — M48.061 SPINAL STENOSIS AT L4-L5 LEVEL: Primary | ICD-10-CM

## 2025-07-08 NOTE — PROGRESS NOTES
"  Too Velasquez is a 84 y.o. male that presents with Back Pain       Back Pain    History of Present Illness  The patient is an 84-year-old male who presents for evaluation of possible surgery.    He experiences incontinence at night, often waking up twice to change his pad. This issue has been ongoing for approximately 2 years. He also reports occasional daytime incontinence, stating \"sometime during the day it will let me know.\" He is currently under the care of a urologist and has not yet undergone a renal ultrasound. He has no history of lower back surgery.    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 He is able to walk short distances but becomes breathless. He uses wraps on his legs to manage swelling.    Currently, he is on Eliquis for atrial fibrillation and is under the care of Dr. Corral for his heart condition. His primary care physician is Dr. Zenobia Stark. He reports no known lung issues and does not require oxygen therapy.     Review of Systems   Musculoskeletal:  Positive for back pain.        Vitals:    07/08/25 1400   BP: 133/76        Physical Exam  Constitutional:       Comments: BMI 31.5   Cardiovascular:      Comments: Moderate bilateral lower extremity edema  Pulmonary:      Effort: Pulmonary effort is normal.   Neurological:      Mental Status: He is alert.      Sensory: No sensory deficit.      Motor: No weakness.   Psychiatric:         Mood and Affect: Mood normal.        I personally interpreted the patient's MRI scan with the patient.   Results  Imaging   - MRI of the back: 01/2025, Spinal stenosis at L4-5.          Assessment and Plan {CC Problem List  Visit Diagnosis  ROS  Review (Popup)  Health Maintenance  Quality  BestPractice  Medications  SmartSets  SnapShot Encounters  Media :23}   Problem List Items Addressed This Visit    None  Visit Diagnoses         Spinal stenosis at " L4-L5 level    -  Primary      Urinary incontinence, unspecified type              Assessment & Plan  1. Spinal stenosis.     The MRI from 01/2025 shows significant spinal stenosis at L4-L5. Symptoms have been present for almost 2 years, including difficulty controlling the bladder. Given the duration of symptoms, the success rate of surgery is lower. The patient's age (84) and current medication (Eliquis) increase the risk of surgery. Surgery might help with bladder control, but it is not guaranteed, especially since he can still walk and stand without significant issues. The potential benefits and risks of surgery were thoroughly discussed. If the condition worsens, particularly if leg pain or weakness limits mobility, further evaluation is recommended.    2. Atrial fibrillation.     Currently on Eliquis for atrial fibrillation. Continuation of Eliquis is crucial for managing the condition. Any potential surgery would require stopping Eliquis due to the increased risk of bleeding.    3. Urinary incontinence.     Experiencing urinary incontinence for about 2 years, more pronounced at night. Uses pads and changes them multiple times during the night. The incontinence might be related to age and prostate enlargement. Currently on Proscar. A renal ultrasound has been ordered but not yet completed. It is recommended to complete the renal ultrasound to rule out any anatomical abnormalities.     Follow Up {Instructions Charge Capture  Follow-up Communications :23}   No follow-ups on file.      Patient or patient representative verbalized consent for the use of Ambient Listening during the visit with  Jesse Prince MD for chart documentation. 7/8/2025  14:22 EDT

## 2025-07-15 ENCOUNTER — TELEPHONE (OUTPATIENT)
Dept: FAMILY MEDICINE CLINIC | Facility: CLINIC | Age: 85
End: 2025-07-15
Payer: MEDICARE

## 2025-07-15 NOTE — TELEPHONE ENCOUNTER
Lorene from CT called states Mr Velasquez has 3 lb weight gain this week and non-pitting edema in lower extremities. Left low lobe faint crackles. Patient is not on water pill.

## 2025-07-16 NOTE — TELEPHONE ENCOUNTER
I SPOKE WITH HOME HEALTH AND SHE STATES THEY ARE DUE TO GO BACK BACK OUT NEXT WEEK, I  TALKED WITH PATIENT WIFE AND SHE STATES HE IS DOWN 2 POUNDS TODAY AND HE HAS HIS SUPPORT HOSE  ON THEY ARE HELPING. SHE STATES IF YOU WANT TO DO TEST SHE IS FINE WITH THAT

## 2025-08-04 ENCOUNTER — TELEPHONE (OUTPATIENT)
Age: 85
End: 2025-08-04
Payer: MEDICARE

## 2025-08-13 ENCOUNTER — OFFICE VISIT (OUTPATIENT)
Age: 85
End: 2025-08-13
Payer: MEDICARE

## 2025-08-13 ENCOUNTER — HOSPITAL ENCOUNTER (OUTPATIENT)
Dept: CARDIOLOGY | Facility: HOSPITAL | Age: 85
Discharge: HOME | End: 2025-08-13
Admitting: NURSE PRACTITIONER
Payer: MEDICARE

## 2025-08-13 VITALS
DIASTOLIC BLOOD PRESSURE: 78 MMHG | HEART RATE: 56 BPM | SYSTOLIC BLOOD PRESSURE: 172 MMHG | OXYGEN SATURATION: 98 % | RESPIRATION RATE: 18 BRPM

## 2025-08-13 DIAGNOSIS — N18.4 CHRONIC KIDNEY DISEASE, STAGE IV (SEVERE): ICD-10-CM

## 2025-08-13 DIAGNOSIS — N18.4 CKD (CHRONIC KIDNEY DISEASE) STAGE 4, GFR 15-29 ML/MIN: Primary | ICD-10-CM

## 2025-08-13 LAB
BH CV VAS MEAS BASILIC ANTECUBITAL FOSSA LEFT: 0.17 CM
BH CV VAS MEAS BASILIC ANTECUBITAL FOSSA RIGHT: 0.17 CM
BH CV VAS MEAS BASILIC FOREARM LEFT - PROX: 0.14 CM
BH CV VAS MEAS BASILIC FOREARM RIGHT - PROX: 0.09 CM
BH CV VAS MEAS BASILIC UPPER ARM LEFT - DIST: 0.17 CM
BH CV VAS MEAS BASILIC UPPER ARM LEFT - MID: 0.2 CM
BH CV VAS MEAS BASILIC UPPER ARM LEFT - PROX: 0.46 CM
BH CV VAS MEAS BASILIC UPPER ARM RIGHT - DIST: 0.19 CM
BH CV VAS MEAS BASILIC UPPER ARM RIGHT - MID: 0.19 CM
BH CV VAS MEAS BASILIC UPPER ARM RIGHT - PROX: 0.26 CM
BH CV VAS MEAS CEPHALIC ANTECUBITAL FOSSA LEFT: 0.2 CM
BH CV VAS MEAS CEPHALIC ANTECUBITAL FOSSA RIGHT: 0.39 CM
BH CV VAS MEAS CEPHALIC FOREARM LEFT - MID: 0.11 CM
BH CV VAS MEAS CEPHALIC FOREARM LEFT - PROX: 0.11 CM
BH CV VAS MEAS CEPHALIC FOREARM RIGHT - DIST: 0.2 CM
BH CV VAS MEAS CEPHALIC FOREARM RIGHT - MID: 0.18 CM
BH CV VAS MEAS CEPHALIC FOREARM RIGHT - PROX: 0.21 CM
BH CV VAS MEAS CEPHALIC UPPER ARM LEFT - DIST: 0.25 CM
BH CV VAS MEAS CEPHALIC UPPER ARM LEFT - MID: 0.19 CM
BH CV VAS MEAS CEPHALIC UPPER ARM LEFT - PROX: 0.26 CM
BH CV VAS MEAS CEPHALIC UPPER ARM RIGHT - DIST: 0.29 CM
BH CV VAS MEAS CEPHALIC UPPER ARM RIGHT - MID: 0.29 CM
BH CV VAS MEAS CEPHALIC UPPER ARM RIGHT - PROX: 0.34 CM

## 2025-08-13 PROCEDURE — 93985 DUP-SCAN HEMO COMPL BI STD: CPT

## 2025-08-26 ENCOUNTER — TELEPHONE (OUTPATIENT)
Dept: FAMILY MEDICINE CLINIC | Facility: CLINIC | Age: 85
End: 2025-08-26
Payer: MEDICARE